# Patient Record
Sex: FEMALE | Race: BLACK OR AFRICAN AMERICAN | NOT HISPANIC OR LATINO | Employment: UNEMPLOYED | RURAL
[De-identification: names, ages, dates, MRNs, and addresses within clinical notes are randomized per-mention and may not be internally consistent; named-entity substitution may affect disease eponyms.]

---

## 2020-04-06 ENCOUNTER — HISTORICAL (OUTPATIENT)
Dept: ADMINISTRATIVE | Facility: HOSPITAL | Age: 45
End: 2020-04-06

## 2020-07-24 ENCOUNTER — HISTORICAL (OUTPATIENT)
Dept: ADMINISTRATIVE | Facility: HOSPITAL | Age: 45
End: 2020-07-24

## 2020-07-24 LAB
ALBUMIN SERPL BCP-MCNC: 4.5 G/DL (ref 3.5–5)
ALBUMIN/GLOB SERPL: 1 {RATIO}
ALP SERPL-CCNC: 88 U/L (ref 37–98)
ALT SERPL W P-5'-P-CCNC: 19 U/L (ref 13–56)
AMYLASE SERPL-CCNC: 134 U/L (ref 25–115)
ANION GAP SERPL CALCULATED.3IONS-SCNC: 16 MMOL/L
APTT PPP: 30.3 SECONDS (ref 25.2–37.3)
AST SERPL W P-5'-P-CCNC: 21 U/L (ref 15–37)
BACTERIA #/AREA URNS HPF: ABNORMAL /HPF
BASOPHILS # BLD AUTO: 0.05 X10E3/UL (ref 0–0.2)
BASOPHILS NFR BLD AUTO: 0.3 % (ref 0–1)
BILIRUB SERPL-MCNC: 0.6 MG/DL (ref 0–1.2)
BILIRUB UR QL STRIP: ABNORMAL MG/DL
BUN SERPL-MCNC: 9 MG/DL (ref 7–18)
BUN/CREAT SERPL: 10
CALCIUM SERPL-MCNC: 9.2 MG/DL (ref 8.5–10.1)
CHLORIDE SERPL-SCNC: 100 MMOL/L (ref 98–107)
CK MB SERPL-MCNC: 1.4 NG/ML (ref 1–3.6)
CK SERPL-CCNC: 193 U/L (ref 26–192)
CLARITY UR: ABNORMAL
CLARITY UR: ABNORMAL
CO2 SERPL-SCNC: 20 MMOL/L (ref 21–32)
COLOR UR: ABNORMAL
COLOR UR: ABNORMAL
CREAT SERPL-MCNC: 0.9 MG/DL (ref 0.55–1.02)
EOSINOPHIL # BLD AUTO: 0.02 X10E3/UL (ref 0–0.5)
EOSINOPHIL NFR BLD AUTO: 0.1 % (ref 1–4)
ERYTHROCYTE [DISTWIDTH] IN BLOOD BY AUTOMATED COUNT: 14.5 % (ref 11.5–14.5)
GLOBULIN SER-MCNC: 4.4 G/DL (ref 2–4)
GLUCOSE SERPL-MCNC: 115 MG/DL (ref 74–106)
GLUCOSE UR STRIP-MCNC: NEGATIVE MG/DL
HCT VFR BLD AUTO: 38.4 % (ref 38–47)
HGB BLD-MCNC: 12.5 G/DL (ref 12–16)
HYALINE CASTS #/AREA URNS LPF: ABNORMAL /LPF (ref 0–2)
IMM GRANULOCYTES # BLD AUTO: 0.08 X10E3/UL (ref 0–0.04)
IMM GRANULOCYTES NFR BLD: 0.5 % (ref 0–0.4)
INR BLD: 1.18 (ref 0–3.3)
KETONES UR STRIP-SCNC: 40 MG/DL
LEUKOCYTE ESTERASE UR QL STRIP: NEGATIVE LEU/UL
LIPASE SERPL-CCNC: 106 U/L (ref 73–393)
LYMPHOCYTES # BLD AUTO: 1.76 X10E3/UL (ref 1–4.8)
LYMPHOCYTES NFR BLD AUTO: 11.5 % (ref 27–41)
MAGNESIUM SERPL-MCNC: 1.8 MG/DL (ref 1.7–2.3)
MCH RBC QN AUTO: 26.5 PG (ref 27–31)
MCHC RBC AUTO-ENTMCNC: 32.6 G/DL (ref 32–36)
MCV RBC AUTO: 81.4 FL (ref 80–96)
MONOCYTES # BLD AUTO: 0.77 X10E3/UL (ref 0–0.8)
MONOCYTES NFR BLD AUTO: 5 % (ref 2–6)
MPC BLD CALC-MCNC: 9.9 FL (ref 9.4–12.4)
MUCOUS THREADS #/AREA URNS HPF: ABNORMAL /HPF
MYOGLOBIN SERPL-MCNC: 48 NG/ML (ref 13–71)
NEUTROPHILS # BLD AUTO: 12.6 X10E3/UL (ref 1.8–7.7)
NEUTROPHILS NFR BLD AUTO: 82.6 % (ref 53–65)
NITRITE UR QL STRIP: NEGATIVE
NRBC # BLD AUTO: 0 X10E3/UL (ref 0–0)
NRBC, AUTO (.00): 0 /100 (ref 0–0)
PH UR STRIP: 6 [PH] (ref 5–8)
PLATELET # BLD AUTO: 366 X10E3/UL (ref 150–400)
POTASSIUM SERPL-SCNC: 3.3 MMOL/L (ref 3.5–5.1)
PROT SERPL-MCNC: 8.9 G/DL (ref 6.4–8.2)
PROT UR QL STRIP: >=300 MG/DL
PROTHROMBIN TIME: 14.4 SECONDS (ref 11.7–14.7)
RBC # BLD AUTO: 4.72 X10E6/UL (ref 4.2–5.4)
RBC # UR STRIP: ABNORMAL ERY/UL
RBC #/AREA URNS HPF: ABNORMAL /HPF (ref 0–3)
SODIUM SERPL-SCNC: 133 MMOL/L (ref 136–145)
SP GR UR STRIP: >=1.03 (ref 1–1.03)
SQUAMOUS #/AREA URNS LPF: ABNORMAL /LPF
TROPONIN I SERPL-MCNC: 0.02 NG/ML (ref 0–0.06)
UROBILINOGEN UR STRIP-ACNC: 0.2 EU/DL
WBC # BLD AUTO: 15.28 X10E3/UL (ref 4.5–11)
WBC #/AREA URNS HPF: ABNORMAL /HPF (ref 0–5)

## 2021-04-07 ENCOUNTER — HOSPITAL ENCOUNTER (EMERGENCY)
Facility: HOSPITAL | Age: 46
Discharge: HOME | End: 2021-04-07

## 2021-04-07 VITALS
BODY MASS INDEX: 26.58 KG/M2 | TEMPERATURE: 98 F | HEIGHT: 63 IN | SYSTOLIC BLOOD PRESSURE: 158 MMHG | HEART RATE: 85 BPM | RESPIRATION RATE: 20 BRPM | WEIGHT: 150 LBS | DIASTOLIC BLOOD PRESSURE: 90 MMHG | OXYGEN SATURATION: 100 %

## 2021-04-07 DIAGNOSIS — T78.40XA ALLERGIC REACTION, INITIAL ENCOUNTER: Primary | ICD-10-CM

## 2021-04-07 LAB
ALBUMIN SERPL BCP-MCNC: 4 G/DL (ref 3.5–5)
ALBUMIN/GLOB SERPL: 1 {RATIO}
ALP SERPL-CCNC: 79 U/L (ref 37–98)
ALT SERPL W P-5'-P-CCNC: 24 U/L (ref 13–56)
ANION GAP SERPL CALCULATED.3IONS-SCNC: 15 MMOL/L
AST SERPL W P-5'-P-CCNC: 21 U/L (ref 15–37)
BASOPHILS # BLD AUTO: 0.06 X10E3/UL (ref 0–0.2)
BASOPHILS NFR BLD AUTO: 0.6 % (ref 0–1)
BILIRUB SERPL-MCNC: 0.3 MG/DL (ref 0–1.2)
BUN SERPL-MCNC: 8 MG/DL (ref 7–18)
BUN/CREAT SERPL: 10.4
CALCIUM SERPL-MCNC: 8.6 MG/DL (ref 8.5–10.1)
CHLORIDE SERPL-SCNC: 103 MMOL/L (ref 98–107)
CO2 SERPL-SCNC: 25 MMOL/L (ref 21–32)
CREAT SERPL-MCNC: 0.77 MG/DL (ref 0.55–1.02)
EOSINOPHIL # BLD AUTO: 0.14 X10E3/UL (ref 0–0.5)
EOSINOPHIL NFR BLD AUTO: 1.5 % (ref 1–4)
ERYTHROCYTE [DISTWIDTH] IN BLOOD BY AUTOMATED COUNT: 15.8 % (ref 11.5–14.5)
GLOBULIN SER-MCNC: 4 G/DL (ref 2–4)
GLUCOSE SERPL-MCNC: 86 MG/DL (ref 74–106)
HCT VFR BLD AUTO: 33.8 % (ref 38–47)
HGB BLD-MCNC: 10.8 G/DL (ref 12–16)
IMM GRANULOCYTES # BLD AUTO: 0.02 X10E3/UL (ref 0–0.04)
IMM GRANULOCYTES NFR BLD: 0.2 % (ref 0–0.4)
LYMPHOCYTES # BLD AUTO: 2.85 X10E3/UL (ref 1–4.8)
LYMPHOCYTES NFR BLD AUTO: 30.1 % (ref 27–41)
MCH RBC QN AUTO: 25.8 PG (ref 27–31)
MCHC RBC AUTO-ENTMCNC: 32 G/DL (ref 32–36)
MCV RBC AUTO: 80.7 FL (ref 80–96)
MONOCYTES # BLD AUTO: 0.61 X10E3/UL (ref 0–0.8)
MONOCYTES NFR BLD AUTO: 6.4 % (ref 2–6)
MPC BLD CALC-MCNC: 10.5 FL (ref 9.4–12.4)
NEUTROPHILS # BLD AUTO: 5.79 X10E3/UL (ref 1.8–7.7)
NEUTROPHILS NFR BLD AUTO: 61.2 % (ref 53–65)
NRBC # BLD AUTO: 0 X10E3/UL (ref 0–0)
NRBC, AUTO (.00): 0 /100 (ref 0–0)
PLATELET # BLD AUTO: 344 X10E3/UL (ref 150–400)
POTASSIUM SERPL-SCNC: 3.2 MMOL/L (ref 3.5–5.1)
PROT SERPL-MCNC: 8 G/DL (ref 6.4–8.2)
RBC # BLD AUTO: 4.19 X10E6/UL (ref 4.2–5.4)
SODIUM SERPL-SCNC: 140 MMOL/L (ref 136–145)
WBC # BLD AUTO: 9.47 X10E3/UL (ref 4.5–11)

## 2021-04-07 PROCEDURE — 96374 THER/PROPH/DIAG INJ IV PUSH: CPT

## 2021-04-07 PROCEDURE — 85025 COMPLETE CBC W/AUTO DIFF WBC: CPT

## 2021-04-07 PROCEDURE — 80053 COMPREHEN METABOLIC PANEL: CPT

## 2021-04-07 PROCEDURE — 96375 TX/PRO/DX INJ NEW DRUG ADDON: CPT

## 2021-04-07 PROCEDURE — 99283 EMERGENCY DEPT VISIT LOW MDM: CPT | Mod: 25

## 2021-04-07 PROCEDURE — 25000003 PHARM REV CODE 250: Performed by: NURSE PRACTITIONER

## 2021-04-07 PROCEDURE — 63600175 PHARM REV CODE 636 W HCPCS: Performed by: NURSE PRACTITIONER

## 2021-04-07 PROCEDURE — 99283 PR EMERGENCY DEPT VISIT,LEVEL III: ICD-10-PCS | Mod: ,,, | Performed by: NURSE PRACTITIONER

## 2021-04-07 PROCEDURE — 96361 HYDRATE IV INFUSION ADD-ON: CPT

## 2021-04-07 PROCEDURE — 36415 COLL VENOUS BLD VENIPUNCTURE: CPT

## 2021-04-07 PROCEDURE — 99283 EMERGENCY DEPT VISIT LOW MDM: CPT | Mod: ,,, | Performed by: NURSE PRACTITIONER

## 2021-04-07 RX ORDER — HYDROXYZINE HYDROCHLORIDE 25 MG/1
25 TABLET, FILM COATED ORAL EVERY 8 HOURS
Qty: 12 TABLET | Refills: 0 | Status: ON HOLD | OUTPATIENT
Start: 2021-04-07 | End: 2022-12-05 | Stop reason: HOSPADM

## 2021-04-07 RX ORDER — METHYLPREDNISOLONE 4 MG/1
TABLET ORAL
Qty: 1 PACKAGE | Refills: 0 | Status: SHIPPED | OUTPATIENT
Start: 2021-04-07 | End: 2021-04-28

## 2021-04-07 RX ORDER — DIPHENHYDRAMINE HYDROCHLORIDE 50 MG/ML
25 INJECTION INTRAMUSCULAR; INTRAVENOUS
Status: COMPLETED | OUTPATIENT
Start: 2021-04-07 | End: 2021-04-07

## 2021-04-07 RX ORDER — METHYLPREDNISOLONE SOD SUCC 125 MG
125 VIAL (EA) INJECTION
Status: COMPLETED | OUTPATIENT
Start: 2021-04-07 | End: 2021-04-07

## 2021-04-07 RX ORDER — FAMOTIDINE 20 MG/1
20 TABLET, FILM COATED ORAL 2 TIMES DAILY
Qty: 14 TABLET | Refills: 0 | Status: ON HOLD | OUTPATIENT
Start: 2021-04-07 | End: 2022-12-05 | Stop reason: HOSPADM

## 2021-04-07 RX ORDER — ERGOCALCIFEROL 1.25 MG/1
50000 CAPSULE ORAL
COMMUNITY
End: 2024-03-04

## 2021-04-07 RX ORDER — IBUPROFEN 100 MG/5ML
1000 SUSPENSION, ORAL (FINAL DOSE FORM) ORAL DAILY
COMMUNITY
End: 2024-03-04

## 2021-04-07 RX ORDER — FAMOTIDINE 10 MG/ML
20 INJECTION INTRAVENOUS
Status: COMPLETED | OUTPATIENT
Start: 2021-04-07 | End: 2021-04-07

## 2021-04-07 RX ORDER — LISINOPRIL AND HYDROCHLOROTHIAZIDE 20; 25 MG/1; MG/1
1 TABLET ORAL DAILY
COMMUNITY
End: 2021-04-07

## 2021-04-07 RX ADMIN — SODIUM CHLORIDE 1000 ML: 9 INJECTION, SOLUTION INTRAVENOUS at 04:04

## 2021-04-07 RX ADMIN — DIPHENHYDRAMINE HYDROCHLORIDE 25 MG: 50 INJECTION INTRAMUSCULAR; INTRAVENOUS at 04:04

## 2021-04-07 RX ADMIN — FAMOTIDINE 20 MG: 10 INJECTION, SOLUTION INTRAVENOUS at 04:04

## 2021-04-07 RX ADMIN — METHYLPREDNISOLONE SODIUM SUCCINATE 125 MG: 125 INJECTION, POWDER, FOR SOLUTION INTRAMUSCULAR; INTRAVENOUS at 04:04

## 2022-12-03 ENCOUNTER — HOSPITAL ENCOUNTER (OUTPATIENT)
Facility: HOSPITAL | Age: 47
Discharge: HOME | End: 2022-12-05
Attending: HOSPITALIST | Admitting: HOSPITALIST

## 2022-12-03 ENCOUNTER — HOSPITAL ENCOUNTER (OUTPATIENT)
Facility: HOSPITAL | Age: 47
Discharge: HOME | End: 2022-12-03
Attending: FAMILY MEDICINE | Admitting: HOSPITALIST

## 2022-12-03 VITALS
DIASTOLIC BLOOD PRESSURE: 87 MMHG | BODY MASS INDEX: 31.54 KG/M2 | SYSTOLIC BLOOD PRESSURE: 125 MMHG | WEIGHT: 178 LBS | HEART RATE: 110 BPM | TEMPERATURE: 99 F | HEIGHT: 63 IN | OXYGEN SATURATION: 100 % | RESPIRATION RATE: 18 BRPM

## 2022-12-03 DIAGNOSIS — E86.0 DEHYDRATION WITH HYPONATREMIA: ICD-10-CM

## 2022-12-03 DIAGNOSIS — R11.2 NAUSEA AND VOMITING, UNSPECIFIED VOMITING TYPE: Primary | ICD-10-CM

## 2022-12-03 DIAGNOSIS — E87.1 DEHYDRATION WITH HYPONATREMIA: ICD-10-CM

## 2022-12-03 DIAGNOSIS — E86.0 DEHYDRATION: ICD-10-CM

## 2022-12-03 DIAGNOSIS — I50.9 CHF (CONGESTIVE HEART FAILURE): ICD-10-CM

## 2022-12-03 DIAGNOSIS — I10 PRIMARY HYPERTENSION: Primary | ICD-10-CM

## 2022-12-03 DIAGNOSIS — E66.9 OBESITY (BMI 30-39.9): ICD-10-CM

## 2022-12-03 DIAGNOSIS — Z72.0 TOBACCO USE: ICD-10-CM

## 2022-12-03 DIAGNOSIS — I10 HYPERTENSION, UNSPECIFIED TYPE: ICD-10-CM

## 2022-12-03 DIAGNOSIS — R06.02 SHORTNESS OF BREATH: ICD-10-CM

## 2022-12-03 DIAGNOSIS — R11.2 INTRACTABLE NAUSEA AND VOMITING: ICD-10-CM

## 2022-12-03 DIAGNOSIS — R06.02 SOB (SHORTNESS OF BREATH): ICD-10-CM

## 2022-12-03 PROBLEM — I47.20 VENTRICULAR TACHYCARDIA: Status: ACTIVE | Noted: 2022-12-03

## 2022-12-03 PROBLEM — D72.829 LEUKOCYTOSIS: Status: ACTIVE | Noted: 2022-12-03

## 2022-12-03 LAB
ALBUMIN SERPL BCP-MCNC: 4.2 G/DL (ref 3.5–5)
ALBUMIN/GLOB SERPL: 0.9 {RATIO}
ALP SERPL-CCNC: 104 U/L (ref 39–100)
ALT SERPL W P-5'-P-CCNC: 20 U/L (ref 13–56)
ANION GAP SERPL CALCULATED.3IONS-SCNC: 16 MMOL/L (ref 7–16)
AST SERPL W P-5'-P-CCNC: 26 U/L (ref 15–37)
B-HCG UR QL: NEGATIVE
BACTERIA #/AREA URNS HPF: ABNORMAL /HPF
BACTERIA #/AREA URNS HPF: ABNORMAL /HPF
BASOPHILS # BLD AUTO: 0.06 K/UL (ref 0–0.2)
BASOPHILS NFR BLD AUTO: 0.4 % (ref 0–1)
BILIRUB SERPL-MCNC: 0.5 MG/DL (ref ?–1.2)
BILIRUB UR QL STRIP: NEGATIVE
BILIRUB UR QL STRIP: NEGATIVE
BUN SERPL-MCNC: 10 MG/DL (ref 7–18)
BUN/CREAT SERPL: 14 (ref 6–20)
CALCIUM SERPL-MCNC: 9.4 MG/DL (ref 8.5–10.1)
CHLORIDE SERPL-SCNC: 98 MMOL/L (ref 98–107)
CLARITY UR: CLEAR
CLARITY UR: CLEAR
CO2 SERPL-SCNC: 25 MMOL/L (ref 21–32)
COLOR UR: ABNORMAL
COLOR UR: YELLOW
CREAT SERPL-MCNC: 0.74 MG/DL (ref 0.55–1.02)
CTP QC/QA: YES
DIFFERENTIAL METHOD BLD: ABNORMAL
EGFR (NO RACE VARIABLE) (RUSH/TITUS): 101 ML/MIN/1.73M²
EOSINOPHIL # BLD AUTO: 0.01 K/UL (ref 0–0.5)
EOSINOPHIL NFR BLD AUTO: 0.1 % (ref 1–4)
ERYTHROCYTE [DISTWIDTH] IN BLOOD BY AUTOMATED COUNT: 16.5 % (ref 11.5–14.5)
GLOBULIN SER-MCNC: 4.7 G/DL (ref 2–4)
GLUCOSE SERPL-MCNC: 102 MG/DL (ref 74–106)
GLUCOSE UR STRIP-MCNC: 30 MG/DL
GLUCOSE UR STRIP-MCNC: NORMAL MG/DL
HCT VFR BLD AUTO: 38.8 % (ref 38–47)
HGB BLD-MCNC: 12 G/DL (ref 12–16)
IMM GRANULOCYTES # BLD AUTO: 0.05 K/UL (ref 0–0.04)
IMM GRANULOCYTES NFR BLD: 0.3 % (ref 0–0.4)
KETONES UR STRIP-SCNC: 100 MG/DL
KETONES UR STRIP-SCNC: 80 MG/DL
LEUKOCYTE ESTERASE UR QL STRIP: NEGATIVE
LEUKOCYTE ESTERASE UR QL STRIP: NEGATIVE
LIPASE SERPL-CCNC: 102 U/L (ref 73–393)
LYMPHOCYTES # BLD AUTO: 1.78 K/UL (ref 1–4.8)
LYMPHOCYTES NFR BLD AUTO: 10.7 % (ref 27–41)
MCH RBC QN AUTO: 23.4 PG (ref 27–31)
MCHC RBC AUTO-ENTMCNC: 30.9 G/DL (ref 32–36)
MCV RBC AUTO: 75.6 FL (ref 80–96)
MONOCYTES # BLD AUTO: 0.76 K/UL (ref 0–0.8)
MONOCYTES NFR BLD AUTO: 4.6 % (ref 2–6)
MPC BLD CALC-MCNC: 9.9 FL (ref 9.4–12.4)
MUCOUS THREADS #/AREA URNS HPF: ABNORMAL /HPF
MUCOUS THREADS #/AREA URNS HPF: ABNORMAL /HPF
NEUTROPHILS # BLD AUTO: 13.9 K/UL (ref 1.8–7.7)
NEUTROPHILS NFR BLD AUTO: 83.9 % (ref 53–65)
NITRITE UR QL STRIP: NEGATIVE
NITRITE UR QL STRIP: NEGATIVE
NRBC # BLD AUTO: 0 X10E3/UL
NRBC, AUTO (.00): 0 %
NT-PROBNP SERPL-MCNC: 400 PG/ML (ref 1–125)
PH UR STRIP: 6 PH UNITS
PH UR STRIP: 6.5 PH UNITS
PLATELET # BLD AUTO: 468 K/UL (ref 150–400)
POTASSIUM SERPL-SCNC: 3.8 MMOL/L (ref 3.5–5.1)
PROT SERPL-MCNC: 8.9 G/DL (ref 6.4–8.2)
PROT UR QL STRIP: 30
PROT UR QL STRIP: 600
RBC # BLD AUTO: 5.13 M/UL (ref 4.2–5.4)
RBC # UR STRIP: ABNORMAL /UL
RBC # UR STRIP: ABNORMAL /UL
RBC #/AREA URNS HPF: ABNORMAL /HPF
RBC #/AREA URNS HPF: ABNORMAL /HPF
SODIUM SERPL-SCNC: 135 MMOL/L (ref 136–145)
SP GR UR STRIP: 1.03
SP GR UR STRIP: 1.04
SQUAMOUS #/AREA URNS LPF: ABNORMAL /LPF
SQUAMOUS #/AREA URNS LPF: ABNORMAL /LPF
TRICHOMONAS #/AREA URNS HPF: ABNORMAL /HPF
TRICHOMONAS #/AREA URNS HPF: ABNORMAL /HPF
UROBILINOGEN UR STRIP-ACNC: NORMAL MG/DL
UROBILINOGEN UR STRIP-ACNC: NORMAL MG/DL
WBC # BLD AUTO: 16.56 K/UL (ref 4.5–11)
WBC #/AREA URNS HPF: ABNORMAL /HPF
WBC #/AREA URNS HPF: ABNORMAL /HPF
YEAST #/AREA URNS HPF: ABNORMAL /HPF
YEAST #/AREA URNS HPF: ABNORMAL /HPF

## 2022-12-03 PROCEDURE — 63600175 PHARM REV CODE 636 W HCPCS: Performed by: NURSE PRACTITIONER

## 2022-12-03 PROCEDURE — 96361 HYDRATE IV INFUSION ADD-ON: CPT

## 2022-12-03 PROCEDURE — 99220 PR INITIAL OBSERVATION CARE,LEVL III: CPT | Mod: ,,, | Performed by: HOSPITALIST

## 2022-12-03 PROCEDURE — 99283 EMERGENCY DEPT VISIT LOW MDM: CPT | Mod: ,,, | Performed by: NURSE PRACTITIONER

## 2022-12-03 PROCEDURE — 96374 THER/PROPH/DIAG INJ IV PUSH: CPT

## 2022-12-03 PROCEDURE — 83880 ASSAY OF NATRIURETIC PEPTIDE: CPT | Performed by: HOSPITALIST

## 2022-12-03 PROCEDURE — 81001 URINALYSIS AUTO W/SCOPE: CPT | Performed by: FAMILY MEDICINE

## 2022-12-03 PROCEDURE — 99220 PR INITIAL OBSERVATION CARE,LEVL III: ICD-10-PCS | Mod: ,,, | Performed by: HOSPITALIST

## 2022-12-03 PROCEDURE — 99285 EMERGENCY DEPT VISIT HI MDM: CPT | Mod: 25

## 2022-12-03 PROCEDURE — 25000003 PHARM REV CODE 250: Performed by: NURSE PRACTITIONER

## 2022-12-03 PROCEDURE — 81001 URINALYSIS AUTO W/SCOPE: CPT | Performed by: NURSE PRACTITIONER

## 2022-12-03 PROCEDURE — 99284 EMERGENCY DEPT VISIT MOD MDM: CPT | Mod: ,,, | Performed by: FAMILY MEDICINE

## 2022-12-03 PROCEDURE — 80053 COMPREHEN METABOLIC PANEL: CPT | Performed by: FAMILY MEDICINE

## 2022-12-03 PROCEDURE — 83690 ASSAY OF LIPASE: CPT | Performed by: FAMILY MEDICINE

## 2022-12-03 PROCEDURE — 99283 PR EMERGENCY DEPT VISIT,LEVEL III: ICD-10-PCS | Mod: ,,, | Performed by: NURSE PRACTITIONER

## 2022-12-03 PROCEDURE — 25500020 PHARM REV CODE 255: Performed by: FAMILY MEDICINE

## 2022-12-03 PROCEDURE — 25000003 PHARM REV CODE 250: Performed by: FAMILY MEDICINE

## 2022-12-03 PROCEDURE — 85025 COMPLETE CBC W/AUTO DIFF WBC: CPT | Performed by: FAMILY MEDICINE

## 2022-12-03 PROCEDURE — 36415 COLL VENOUS BLD VENIPUNCTURE: CPT | Performed by: FAMILY MEDICINE

## 2022-12-03 PROCEDURE — 63600175 PHARM REV CODE 636 W HCPCS: Performed by: FAMILY MEDICINE

## 2022-12-03 PROCEDURE — 81025 URINE PREGNANCY TEST: CPT | Performed by: FAMILY MEDICINE

## 2022-12-03 PROCEDURE — 96365 THER/PROPH/DIAG IV INF INIT: CPT

## 2022-12-03 PROCEDURE — G0378 HOSPITAL OBSERVATION PER HR: HCPCS

## 2022-12-03 PROCEDURE — 96375 TX/PRO/DX INJ NEW DRUG ADDON: CPT

## 2022-12-03 PROCEDURE — 99284 PR EMERGENCY DEPT VISIT,LEVEL IV: ICD-10-PCS | Mod: ,,, | Performed by: FAMILY MEDICINE

## 2022-12-03 PROCEDURE — 81003 URINALYSIS AUTO W/O SCOPE: CPT | Performed by: FAMILY MEDICINE

## 2022-12-03 RX ORDER — PROMETHAZINE HYDROCHLORIDE 25 MG/1
25 SUPPOSITORY RECTAL
Status: COMPLETED | OUTPATIENT
Start: 2022-12-03 | End: 2022-12-03

## 2022-12-03 RX ORDER — ONDANSETRON 4 MG/1
4 TABLET, ORALLY DISINTEGRATING ORAL 2 TIMES DAILY
Qty: 20 TABLET | Refills: 0 | Status: ON HOLD | OUTPATIENT
Start: 2022-12-03 | End: 2023-06-28

## 2022-12-03 RX ORDER — METOCLOPRAMIDE HYDROCHLORIDE 5 MG/ML
10 INJECTION INTRAMUSCULAR; INTRAVENOUS
Status: COMPLETED | OUTPATIENT
Start: 2022-12-03 | End: 2022-12-03

## 2022-12-03 RX ORDER — FAMOTIDINE 10 MG/ML
20 INJECTION INTRAVENOUS
Status: COMPLETED | OUTPATIENT
Start: 2022-12-03 | End: 2022-12-03

## 2022-12-03 RX ORDER — ONDANSETRON 2 MG/ML
4 INJECTION INTRAMUSCULAR; INTRAVENOUS
Status: COMPLETED | OUTPATIENT
Start: 2022-12-03 | End: 2022-12-03

## 2022-12-03 RX ADMIN — PROMETHAZINE HYDROCHLORIDE 25 MG: 25 INJECTION INTRAMUSCULAR; INTRAVENOUS at 09:12

## 2022-12-03 RX ADMIN — PROMETHAZINE HYDROCHLORIDE 25 MG: 25 SUPPOSITORY RECTAL at 08:12

## 2022-12-03 RX ADMIN — FAMOTIDINE 20 MG: 10 INJECTION, SOLUTION INTRAVENOUS at 08:12

## 2022-12-03 RX ADMIN — SODIUM CHLORIDE 1000 ML: 9 INJECTION, SOLUTION INTRAVENOUS at 03:12

## 2022-12-03 RX ADMIN — ONDANSETRON 4 MG: 2 INJECTION INTRAMUSCULAR; INTRAVENOUS at 01:12

## 2022-12-03 RX ADMIN — IOPAMIDOL 100 ML: 755 INJECTION, SOLUTION INTRAVENOUS at 03:12

## 2022-12-03 RX ADMIN — SODIUM CHLORIDE 1000 ML: 9 INJECTION, SOLUTION INTRAVENOUS at 08:12

## 2022-12-03 RX ADMIN — SODIUM CHLORIDE, POTASSIUM CHLORIDE, SODIUM LACTATE AND CALCIUM CHLORIDE 1000 ML: 600; 310; 30; 20 INJECTION, SOLUTION INTRAVENOUS at 10:12

## 2022-12-03 RX ADMIN — SODIUM CHLORIDE 1000 ML: 9 INJECTION, SOLUTION INTRAVENOUS at 09:12

## 2022-12-03 RX ADMIN — SODIUM CHLORIDE 1000 ML: 9 INJECTION, SOLUTION INTRAVENOUS at 01:12

## 2022-12-03 RX ADMIN — METOCLOPRAMIDE 10 MG: 5 INJECTION, SOLUTION INTRAMUSCULAR; INTRAVENOUS at 08:12

## 2022-12-03 NOTE — ASSESSMENT & PLAN NOTE
Likely related to dehydration.   There is history of panic attacks.   Continue to follow.    Unable to complete additional studies as patient is leaving the hospital.

## 2022-12-03 NOTE — HPI
46yo woman history of HTN who presents with intractable n/v for the last four days.  She states that she hasn't been able to eat or drink much over the last four days.  She also reported a history of heart failure that was diagnosed several years ago in an emergency department.  She reports swelling in lower extremities and upper extremities although she is not swollen now.         She has ketones in her urine.  Admission was recommended by Dr. Spain for dehydration.  Patient received 2 liters of IV fluids.  Electrolytes are normal.          Patient informed Dr. Spain that she would like to go home.  Unclear if patient will be leaving AMA or not.

## 2022-12-03 NOTE — ASSESSMENT & PLAN NOTE
Body mass index is 31.53 kg/m². Morbid obesity complicates all aspects of disease management from diagnostic modalities to treatment. Weight loss encouraged and health benefits explained to patient.

## 2022-12-03 NOTE — CONSULTS
[facility] - Emergency Department  Hospital Medicine  Consult Note    Patient Name: Martita Hilliard  MRN: [id number]  Admission Date: 12/3/2022  Hospital Length of Stay: 0 days  Attending Physician: No att. providers found   Primary Care Provider: Primary Doctor No           Patient information was obtained from patient, past medical records and ER records.     Consults  Subjective:     Principal Problem: Nausea and vomiting    Chief Complaint:   Chief Complaint   Patient presents with    Dehydration    Vomiting        HPI: 48yo woman history of HTN who presents with intractable n/v for the last four days.  She states that she hasn't been able to eat or drink much over the last four days.  She also reported a history of heart failure that was diagnosed several years ago in an emergency department.  She reports swelling in lower extremities and upper extremities although she is not swollen now.         She has ketones in her urine.  Admission was recommended by Dr. Spain for dehydration.  Patient received 2 liters of IV fluids.  Electrolytes are normal.          Patient informed Dr. Spain that she would like to go home.  Unclear if patient will be leaving AMA or not.          No past medical history on file.    No past surgical history on file.    Review of patient's allergies indicates:   Allergen Reactions    Penicillins     Rocephin [ceftriaxone]        No current facility-administered medications on file prior to encounter.     Current Outpatient Medications on File Prior to Encounter   Medication Sig    [DISCONTINUED] lisinopriL-hydrochlorothiazide (PRINZIDE,ZESTORETIC) 20-25 mg Tab Take 1 tablet by mouth once daily.    ascorbic acid, vitamin C, (VITAMIN C) 1000 MG tablet Take 1,000 mg by mouth once daily.    ergocalciferol (VITAMIN D2) 50,000 unit Cap Take 50,000 Units by mouth every 7 days.    famotidine (PEPCID) 20 MG tablet Take 1 tablet (20 mg total) by mouth 2 (two) times daily. for 7 days     hydrOXYzine HCL (ATARAX) 25 MG tablet Take 1 tablet (25 mg total) by mouth every 8 (eight) hours. Take one tab PO every 8 hours x2 days.  Then take 1 tab PO every 8 hours as needed for swelling or other signs of allergic reaction.     Family History    None       Tobacco Use    Smoking status: Unknown    Smokeless tobacco: Not on file   Substance and Sexual Activity    Alcohol use: Not on file    Drug use: Not on file    Sexual activity: Not on file     Review of Systems   Constitutional:  Negative for activity change, chills, fatigue and fever.   HENT:  Negative for congestion and sore throat.    Respiratory:  Negative for chest tightness.    Gastrointestinal:  Positive for diarrhea, nausea and vomiting. Negative for abdominal distention and abdominal pain.   Endocrine: Negative for cold intolerance and heat intolerance.   Genitourinary:  Negative for dysuria.   Musculoskeletal:  Negative for arthralgias and back pain.   Skin:  Negative for color change and rash.   Neurological:  Negative for dizziness, tremors and headaches.   Psychiatric/Behavioral:  Negative for agitation. The patient is not nervous/anxious.    Objective:     Vital Signs (Most Recent):  Temp: 98.5 °F (36.9 °C) (12/03/22 1235)  Pulse: 110 (12/03/22 1232)  Resp: 18 (12/03/22 1232)  BP: 125/87 (12/03/22 1232)  SpO2: 100 % (12/03/22 1232) Vital Signs (24h Range):  Temp:  [98.5 °F (36.9 °C)] 98.5 °F (36.9 °C)  Pulse:  [110] 110  Resp:  [18] 18  SpO2:  [100 %] 100 %  BP: (125)/(87) 125/87     Weight: 80.7 kg (178 lb)  Body mass index is 31.53 kg/m².    Physical Exam  Constitutional:       Appearance: Normal appearance.   HENT:      Head: Normocephalic and atraumatic.      Nose: Nose normal.      Mouth/Throat:      Mouth: Mucous membranes are moist.      Pharynx: Oropharynx is clear.   Eyes:      Extraocular Movements: Extraocular movements intact.      Conjunctiva/sclera: Conjunctivae normal.      Pupils: Pupils are equal, round, and reactive to  light.   Cardiovascular:      Rate and Rhythm: Normal rate and regular rhythm.      Pulses: Normal pulses.      Heart sounds: Normal heart sounds.   Pulmonary:      Effort: Pulmonary effort is normal.      Breath sounds: Normal breath sounds.   Abdominal:      General: Abdomen is flat. Bowel sounds are normal.      Palpations: Abdomen is soft.   Musculoskeletal:         General: Normal range of motion.      Cervical back: Normal range of motion and neck supple.   Skin:     General: Skin is warm and dry.   Neurological:      General: No focal deficit present.      Mental Status: She is alert and oriented to person, place, and time.   Psychiatric:         Mood and Affect: Mood normal.         Behavior: Behavior normal.       Significant Labs: All pertinent labs within the past 24 hours have been reviewed.    Significant Imaging: I have reviewed all pertinent imaging results/findings within the past 24 hours.    Assessment/Plan:     * Nausea and vomiting  Intractable nausea and vomiting for four days  This improved with zofran.   Patient no longer with nausea and would like to be discharged.       Leukocytosis  WBC 16.  Maybe related to dehydration.   Infection source unclear.  Work-up not completed prior to discharge.        Obesity (BMI 30-39.9)  Body mass index is 31.53 kg/m². Morbid obesity complicates all aspects of disease management from diagnostic modalities to treatment. Weight loss encouraged and health benefits explained to patient.         Shortness of breath  Etiology is unclear and is improving.    No work-up in ED prior to patient leaving.    BNP and D dimer ordered but not completed.    CXR ordered.    Studies ordered by me not completed prior to patient leaving.        Ventricular tachycardia  Likely related to dehydration.   There is history of panic attacks.   Continue to follow.    Unable to complete additional studies as patient is leaving the hospital.        VTE Risk Mitigation (From admission,  onward)    None              Thank you for your consult. I will follow-up with patient. Please contact us if you have any additional questions.    Michelle Price MD  Department of Hospital Medicine   [facility] - Emergency Department

## 2022-12-03 NOTE — ASSESSMENT & PLAN NOTE
Etiology is unclear and is improving.    No work-up in ED prior to patient leaving.    BNP and D dimer ordered but not completed.    CXR ordered.    Studies ordered by me not completed prior to patient leaving.

## 2022-12-03 NOTE — ASSESSMENT & PLAN NOTE
WBC 16.  Maybe related to dehydration.   Infection source unclear.  Work-up not completed prior to discharge.

## 2022-12-03 NOTE — DISCHARGE INSTRUCTIONS
Stop smoking marijuana. Follow a liquid diet for the next few days, until your stomach feels better. You can then add some bland foods, like toast, applesauce, chicken noodle soup, crackers, etc. When you are able to eat bland foods without difficulty, you may advance back to your regular diet.  Follow up with the [facility].

## 2022-12-03 NOTE — SUBJECTIVE & OBJECTIVE
No past medical history on file.    No past surgical history on file.    Review of patient's allergies indicates:   Allergen Reactions    Penicillins     Rocephin [ceftriaxone]        No current facility-administered medications on file prior to encounter.     Current Outpatient Medications on File Prior to Encounter   Medication Sig    [DISCONTINUED] lisinopriL-hydrochlorothiazide (PRINZIDE,ZESTORETIC) 20-25 mg Tab Take 1 tablet by mouth once daily.    ascorbic acid, vitamin C, (VITAMIN C) 1000 MG tablet Take 1,000 mg by mouth once daily.    ergocalciferol (VITAMIN D2) 50,000 unit Cap Take 50,000 Units by mouth every 7 days.    famotidine (PEPCID) 20 MG tablet Take 1 tablet (20 mg total) by mouth 2 (two) times daily. for 7 days    hydrOXYzine HCL (ATARAX) 25 MG tablet Take 1 tablet (25 mg total) by mouth every 8 (eight) hours. Take one tab PO every 8 hours x2 days.  Then take 1 tab PO every 8 hours as needed for swelling or other signs of allergic reaction.     Family History    None       Tobacco Use    Smoking status: Unknown    Smokeless tobacco: Not on file   Substance and Sexual Activity    Alcohol use: Not on file    Drug use: Not on file    Sexual activity: Not on file     Review of Systems   Constitutional:  Negative for activity change, chills, fatigue and fever.   HENT:  Negative for congestion and sore throat.    Respiratory:  Negative for chest tightness.    Gastrointestinal:  Positive for diarrhea, nausea and vomiting. Negative for abdominal distention and abdominal pain.   Endocrine: Negative for cold intolerance and heat intolerance.   Genitourinary:  Negative for dysuria.   Musculoskeletal:  Negative for arthralgias and back pain.   Skin:  Negative for color change and rash.   Neurological:  Negative for dizziness, tremors and headaches.   Psychiatric/Behavioral:  Negative for agitation. The patient is not nervous/anxious.    Objective:     Vital Signs (Most Recent):  Temp: 98.5 °F (36.9 °C)  (12/03/22 1235)  Pulse: 110 (12/03/22 1232)  Resp: 18 (12/03/22 1232)  BP: 125/87 (12/03/22 1232)  SpO2: 100 % (12/03/22 1232) Vital Signs (24h Range):  Temp:  [98.5 °F (36.9 °C)] 98.5 °F (36.9 °C)  Pulse:  [110] 110  Resp:  [18] 18  SpO2:  [100 %] 100 %  BP: (125)/(87) 125/87     Weight: 80.7 kg (178 lb)  Body mass index is 31.53 kg/m².    Physical Exam  Constitutional:       Appearance: Normal appearance.   HENT:      Head: Normocephalic and atraumatic.      Nose: Nose normal.      Mouth/Throat:      Mouth: Mucous membranes are moist.      Pharynx: Oropharynx is clear.   Eyes:      Extraocular Movements: Extraocular movements intact.      Conjunctiva/sclera: Conjunctivae normal.      Pupils: Pupils are equal, round, and reactive to light.   Cardiovascular:      Rate and Rhythm: Normal rate and regular rhythm.      Pulses: Normal pulses.      Heart sounds: Normal heart sounds.   Pulmonary:      Effort: Pulmonary effort is normal.      Breath sounds: Normal breath sounds.   Abdominal:      General: Abdomen is flat. Bowel sounds are normal.      Palpations: Abdomen is soft.   Musculoskeletal:         General: Normal range of motion.      Cervical back: Normal range of motion and neck supple.   Skin:     General: Skin is warm and dry.   Neurological:      General: No focal deficit present.      Mental Status: She is alert and oriented to person, place, and time.   Psychiatric:         Mood and Affect: Mood normal.         Behavior: Behavior normal.       Significant Labs: All pertinent labs within the past 24 hours have been reviewed.    Significant Imaging: I have reviewed all pertinent imaging results/findings within the past 24 hours.

## 2022-12-03 NOTE — ASSESSMENT & PLAN NOTE
Intractable nausea and vomiting for four days  This improved with zofran.   Patient no longer with nausea and would like to be discharged.

## 2022-12-03 NOTE — ED PROVIDER NOTES
Encounter Date: 12/3/2022    SCRIBE #1 NOTE: I, Paola Wilson, am scribing for, and in the presence of,  Kori Miller MD. I have scribed the entire note.     History     Chief Complaint   Patient presents with    Dehydration    Vomiting     Patient is a 47 y.o. female who presents to the emergency department with complaints of vomiting. Patient explains that 4 days ago she began experiencing nausea and vomiting. Patient also reports diarrhea, shortness of breath, and heart palpitations. Patient further explains that these symptoms occur during every menstrual cycle. Patient notes a medical history of congestive heart failure, hypertension, pancreatitis, and degenerative bone disease. No other symptoms reported.    The history is provided by the patient. No  was used.   Review of patient's allergies indicates:   Allergen Reactions    Ace inhibitors Swelling    Penicillins     Rocephin [ceftriaxone]      Past Medical History:   Diagnosis Date    Hypertension      Past Surgical History:   Procedure Laterality Date    TONSILLECTOMY       No family history on file.  Social History     Tobacco Use    Smoking status: Unknown     Review of Systems   HENT: Negative.     Eyes: Negative.    Respiratory:  Positive for shortness of breath.    Cardiovascular:  Positive for palpitations.   Gastrointestinal:  Positive for diarrhea, nausea and vomiting.   Endocrine: Negative.    Allergic/Immunologic: Negative.    Neurological:  Positive for weakness.   Hematological: Negative.    Psychiatric/Behavioral: Negative.     All other systems reviewed and are negative.    Physical Exam     Initial Vitals   BP Pulse Resp Temp SpO2   12/03/22 1232 12/03/22 1232 12/03/22 1232 12/03/22 1235 12/03/22 1232   125/87 110 18 98.5 °F (36.9 °C) 100 %      MAP       --                Physical Exam    Nursing note and vitals reviewed.  Constitutional: She appears well-developed and well-nourished.   HENT:   Head:  Normocephalic and atraumatic.   Right Ear: External ear normal.   Left Ear: External ear normal.   Nose: Nose normal.   Eyes: EOM are normal.   Neck:   Normal range of motion.  Cardiovascular:  Normal rate.           Pulmonary/Chest: Breath sounds normal.   Abdominal: Bowel sounds are normal.   Musculoskeletal:         General: Normal range of motion.      Cervical back: Normal range of motion.     Neurological: She is alert. She has normal strength.   Skin: Skin is warm and dry.   Psychiatric: She has a normal mood and affect.       ED Course   Procedures  Labs Reviewed   COMPREHENSIVE METABOLIC PANEL - Abnormal; Notable for the following components:       Result Value    Sodium 135 (*)     Total Protein 8.9 (*)     Globulin 4.7 (*)     Alk Phos 104 (*)     All other components within normal limits   CBC WITH DIFFERENTIAL - Abnormal; Notable for the following components:    WBC 16.56 (*)     MCV 75.6 (*)     MCH 23.4 (*)     MCHC 30.9 (*)     RDW 16.5 (*)     Platelet Count 468 (*)     Neutrophils % 83.9 (*)     Lymphocytes % 10.7 (*)     Eosinophils % 0.1 (*)     Neutrophils, Abs 13.90 (*)     Immature Granulocytes, Absolute 0.05 (*)     All other components within normal limits   URINALYSIS, REFLEX TO URINE CULTURE - Abnormal; Notable for the following components:    Glucose, UA 30 (*)     Ketones,  (*)     Blood, UA Large (*)     Specific Gravity, UA 1.031 (*)     All other components within normal limits   URINALYSIS, MICROSCOPIC - Abnormal; Notable for the following components:    Yeast, UA Few (*)     Mucus, UA Moderate (*)     All other components within normal limits   NT-PRO NATRIURETIC PEPTIDE - Abnormal; Notable for the following components:    ProBNP 400 (*)     All other components within normal limits   LIPASE - Normal   CBC W/ AUTO DIFFERENTIAL    Narrative:     The following orders were created for panel order CBC auto differential.  Procedure                               Abnormality          Status                     ---------                               -----------         ------                     CBC with Differential[[id number]]        Abnormal            Final result                 Please view results for these tests on the individual orders.   POCT URINE PREGNANCY          Imaging Results              CT Abdomen Pelvis With Contrast (Final result)  Result time 12/03/22 15:10:32      Final result by Michael Rodriguez DO (12/03/22 15:10:32)                   Impression:      No convincing acute CT findings.  Small hiatal hernia.  Other/detailed findings as above.    The CT exam was performed using one or more of the following dose    reduction techniques- Automated exposure control, adjustment of the mA    and/or kV according to patient size, and/or use of iterative    reconstructed technique.    Point of Service: [facility]      Electronically signed by: Michael Rodriguez  Date:    12/03/2022  Time:    15:10               Narrative:    EXAMINATION:  CT ABDOMEN PELVIS WITH CONTRAST    CLINICAL HISTORY:  Abdominal pain, acute, nonlocalized;    COMPARISON:  CT abdomen pelvis July 24, 2020    TECHNIQUE:  Multiple axial tomographic images of the abdomen and pelvis were obtained after administration of 100 cc Isovue 370 intravenous contrast.    FINDINGS:  Mild dependent changes of the lungs noted.    No worrisome focal hepatic abnormality demonstrated on submitted images.  Visualized gallbladder grossly unremarkable.  Visualized pancreas appears unremarkable.  Spleen grossly unremarkable.    Bilateral adrenal glands grossly unremarkable.  Small renal hypodensities noted which are too small to characterize but may reflect cysts.  No evidence of hydronephrosis.  Uterus and adnexa grossly unremarkable.    Small hiatal hernia.  No convincing evidence of gastrointestinal obstruction or acute appendicitis.  Vasculature grossly unremarkable.  Visualized osseous and surrounding soft tissue structures  demonstrate no acute abnormality.                                       Medications   sodium chloride 0.9% bolus 1,000 mL (0 mLs Intravenous Stopped 12/3/22 1452)   ondansetron injection 4 mg (4 mg Intravenous Given 12/3/22 1353)   sodium chloride 0.9% bolus 1,000 mL (0 mLs Intravenous Stopped 12/3/22 1615)   iopamidoL (ISOVUE-370) injection 100 mL (100 mLs Intravenous Given 12/3/22 1504)     Medical Decision Making:   ED Management:  Patient states she is feeling much better after the Zofran.  She is been able to drink p.o. without any further nausea or vomiting.          Attending Attestation:           Physician Attestation for Scribe:  Physician Attestation Statement for Scribe #1: I, Kori Miller MD, reviewed documentation, as scribed by Paola Girard in my presence, and it is both accurate and complete.                        Clinical Impression:   Final diagnoses:  [E86.0] Dehydration  [R11.2] Nausea and vomiting, unspecified vomiting type (Primary)      ED Disposition Condition    Discharge Stable          ED Prescriptions       Medication Sig Dispense Start Date End Date Auth. Provider    ondansetron (ZOFRAN-ODT) 4 MG TbDL Take 1 tablet (4 mg total) by mouth 2 (two) times daily. 20 tablet 12/3/2022 -- Kori Spain MD          Follow-up Information    None          Kori Spain MD  12/05/22 0083

## 2022-12-03 NOTE — Clinical Note
Diagnosis: Dehydration [276.51.ICD-9-CM]   Future Attending Provider: LU ROWLAND [[id number]]   Admitting Provider:: LU ROWLAND [[id number]]

## 2022-12-04 PROBLEM — Z72.0 TOBACCO USE: Status: ACTIVE | Noted: 2022-12-04

## 2022-12-04 PROBLEM — R11.2 INTRACTABLE NAUSEA AND VOMITING: Status: ACTIVE | Noted: 2022-12-04

## 2022-12-04 PROBLEM — R06.02 SHORTNESS OF BREATH: Status: ACTIVE | Noted: 2022-12-04

## 2022-12-04 LAB
ACETONE SERPL QL SCN: NEGATIVE
ANION GAP SERPL CALCULATED.3IONS-SCNC: 14 MMOL/L (ref 7–16)
BASOPHILS # BLD AUTO: 0.06 K/UL (ref 0–0.2)
BASOPHILS NFR BLD AUTO: 0.4 % (ref 0–1)
BUN SERPL-MCNC: 5 MG/DL (ref 7–18)
BUN/CREAT SERPL: 7 (ref 6–20)
CALCIUM SERPL-MCNC: 8.2 MG/DL (ref 8.5–10.1)
CHLORIDE SERPL-SCNC: 105 MMOL/L (ref 98–107)
CO2 SERPL-SCNC: 22 MMOL/L (ref 21–32)
CREAT SERPL-MCNC: 0.68 MG/DL (ref 0.55–1.02)
DIFFERENTIAL METHOD BLD: ABNORMAL
EGFR (NO RACE VARIABLE) (RUSH/TITUS): 108 ML/MIN/1.73M²
EOSINOPHIL # BLD AUTO: 0.02 K/UL (ref 0–0.5)
EOSINOPHIL NFR BLD AUTO: 0.1 % (ref 1–4)
ERYTHROCYTE [DISTWIDTH] IN BLOOD BY AUTOMATED COUNT: 16.5 % (ref 11.5–14.5)
GLUCOSE SERPL-MCNC: 85 MG/DL (ref 74–106)
HADV DNA NPH QL NAA+NON-PROBE: NOT DETECTED
HCT VFR BLD AUTO: 31.7 % (ref 38–47)
HGB BLD-MCNC: 9.6 G/DL (ref 12–16)
HMPV RNA NPH QL NAA+NON-PROBE: NOT DETECTED
IMM GRANULOCYTES # BLD AUTO: 0.06 K/UL (ref 0–0.04)
IMM GRANULOCYTES NFR BLD: 0.4 % (ref 0–0.4)
INFLUENZA A (SUBTYPE H1): NOT DETECTED
INFLUENZA A (SUBTYPE H3): NOT DETECTED
INFLUENZA A (VERIGENE): NOT DETECTED
INFLUENZA B (VERIGENE): NOT DETECTED
LYMPHOCYTES # BLD AUTO: 2.08 K/UL (ref 1–4.8)
LYMPHOCYTES NFR BLD AUTO: 15.5 % (ref 27–41)
MAGNESIUM SERPL-MCNC: 2 MG/DL (ref 1.7–2.3)
MCH RBC QN AUTO: 23.5 PG (ref 27–31)
MCHC RBC AUTO-ENTMCNC: 30.3 G/DL (ref 32–36)
MCV RBC AUTO: 77.7 FL (ref 80–96)
MONOCYTES # BLD AUTO: 0.83 K/UL (ref 0–0.8)
MONOCYTES NFR BLD AUTO: 6.2 % (ref 2–6)
MPC BLD CALC-MCNC: 10.9 FL (ref 9.4–12.4)
NEUTROPHILS # BLD AUTO: 10.36 K/UL (ref 1.8–7.7)
NEUTROPHILS NFR BLD AUTO: 77.4 % (ref 53–65)
NRBC # BLD AUTO: 0 X10E3/UL
NRBC, AUTO (.00): 0 %
PARAINFLUENZA 1: NOT DETECTED
PARAINFLUENZA 2: NOT DETECTED
PARAINFLUENZA 3: NOT DETECTED
PARAINFLUENZA 4: NOT DETECTED
PLATELET # BLD AUTO: 350 K/UL (ref 150–400)
POTASSIUM SERPL-SCNC: 3.3 MMOL/L (ref 3.5–5.1)
RBC # BLD AUTO: 4.08 M/UL (ref 4.2–5.4)
RESPIRATORY SYNCYTIAL VIRUS A: NOT DETECTED
RESPIRATORY SYNCYTIAL VIRUS B: NOT DETECTED
RHINOVIRUS: NOT DETECTED
SARS-COV-2 RDRP RESP QL NAA+PROBE: NEGATIVE
SODIUM SERPL-SCNC: 138 MMOL/L (ref 136–145)
TSH SERPL DL<=0.005 MIU/L-ACNC: 1.97 UIU/ML (ref 0.36–3.74)
WBC # BLD AUTO: 13.41 K/UL (ref 4.5–11)

## 2022-12-04 PROCEDURE — 93005 ELECTROCARDIOGRAM TRACING: CPT

## 2022-12-04 PROCEDURE — 99220 PR INITIAL OBSERVATION CARE,LEVL III: CPT | Mod: ,,, | Performed by: HOSPITALIST

## 2022-12-04 PROCEDURE — 25000003 PHARM REV CODE 250: Performed by: HOSPITALIST

## 2022-12-04 PROCEDURE — C9113 INJ PANTOPRAZOLE SODIUM, VIA: HCPCS | Performed by: HOSPITALIST

## 2022-12-04 PROCEDURE — 96375 TX/PRO/DX INJ NEW DRUG ADDON: CPT

## 2022-12-04 PROCEDURE — 82009 KETONE BODYS QUAL: CPT | Performed by: HOSPITALIST

## 2022-12-04 PROCEDURE — 93010 ELECTROCARDIOGRAM REPORT: CPT | Mod: ,,, | Performed by: HOSPITALIST

## 2022-12-04 PROCEDURE — 99499 UNLISTED E&M SERVICE: CPT | Mod: ,,, | Performed by: HOSPITALIST

## 2022-12-04 PROCEDURE — 99220 PR INITIAL OBSERVATION CARE,LEVL III: ICD-10-PCS | Mod: ,,, | Performed by: HOSPITALIST

## 2022-12-04 PROCEDURE — 93010 EKG 12-LEAD: ICD-10-PCS | Mod: ,,, | Performed by: HOSPITALIST

## 2022-12-04 PROCEDURE — 80048 BASIC METABOLIC PNL TOTAL CA: CPT | Performed by: HOSPITALIST

## 2022-12-04 PROCEDURE — G0378 HOSPITAL OBSERVATION PER HR: HCPCS

## 2022-12-04 PROCEDURE — 87633 RESP VIRUS 12-25 TARGETS: CPT | Performed by: HOSPITALIST

## 2022-12-04 PROCEDURE — 63600175 PHARM REV CODE 636 W HCPCS: Performed by: HOSPITALIST

## 2022-12-04 PROCEDURE — 36415 COLL VENOUS BLD VENIPUNCTURE: CPT | Performed by: HOSPITALIST

## 2022-12-04 PROCEDURE — 84443 ASSAY THYROID STIM HORMONE: CPT | Performed by: HOSPITALIST

## 2022-12-04 PROCEDURE — 85025 COMPLETE CBC W/AUTO DIFF WBC: CPT | Performed by: HOSPITALIST

## 2022-12-04 PROCEDURE — 83735 ASSAY OF MAGNESIUM: CPT | Performed by: HOSPITALIST

## 2022-12-04 PROCEDURE — 99499 NO LOS: ICD-10-PCS | Mod: ,,, | Performed by: HOSPITALIST

## 2022-12-04 PROCEDURE — 87635 SARS-COV-2 COVID-19 AMP PRB: CPT | Performed by: HOSPITALIST

## 2022-12-04 RX ORDER — SIMETHICONE 80 MG
1 TABLET,CHEWABLE ORAL 3 TIMES DAILY PRN
Status: DISCONTINUED | OUTPATIENT
Start: 2022-12-04 | End: 2022-12-05 | Stop reason: HOSPADM

## 2022-12-04 RX ORDER — DIPHENHYDRAMINE HYDROCHLORIDE 50 MG/ML
50 INJECTION INTRAMUSCULAR; INTRAVENOUS ONCE
Status: DISCONTINUED | OUTPATIENT
Start: 2022-12-04 | End: 2022-12-05 | Stop reason: HOSPADM

## 2022-12-04 RX ORDER — SODIUM CHLORIDE 9 MG/ML
INJECTION, SOLUTION INTRAVENOUS CONTINUOUS
Status: DISCONTINUED | OUTPATIENT
Start: 2022-12-04 | End: 2022-12-05 | Stop reason: HOSPADM

## 2022-12-04 RX ORDER — TRAZODONE HYDROCHLORIDE 50 MG/1
50 TABLET ORAL NIGHTLY PRN
Status: DISCONTINUED | OUTPATIENT
Start: 2022-12-04 | End: 2022-12-05 | Stop reason: HOSPADM

## 2022-12-04 RX ORDER — BISACODYL 5 MG
10 TABLET, DELAYED RELEASE (ENTERIC COATED) ORAL DAILY PRN
Status: DISCONTINUED | OUTPATIENT
Start: 2022-12-04 | End: 2022-12-05 | Stop reason: HOSPADM

## 2022-12-04 RX ORDER — PANTOPRAZOLE SODIUM 40 MG/10ML
40 INJECTION, POWDER, LYOPHILIZED, FOR SOLUTION INTRAVENOUS DAILY
Status: DISCONTINUED | OUTPATIENT
Start: 2022-12-04 | End: 2022-12-05 | Stop reason: HOSPADM

## 2022-12-04 RX ORDER — GUAIFENESIN/DEXTROMETHORPHAN 100-10MG/5
10 SYRUP ORAL EVERY 6 HOURS PRN
Status: DISCONTINUED | OUTPATIENT
Start: 2022-12-04 | End: 2022-12-05 | Stop reason: HOSPADM

## 2022-12-04 RX ORDER — ONDANSETRON 2 MG/ML
8 INJECTION INTRAMUSCULAR; INTRAVENOUS EVERY 6 HOURS PRN
Status: DISCONTINUED | OUTPATIENT
Start: 2022-12-04 | End: 2022-12-05 | Stop reason: HOSPADM

## 2022-12-04 RX ORDER — ACETAMINOPHEN 500 MG
1000 TABLET ORAL EVERY 6 HOURS PRN
Status: DISCONTINUED | OUTPATIENT
Start: 2022-12-04 | End: 2022-12-05 | Stop reason: HOSPADM

## 2022-12-04 RX ADMIN — PANTOPRAZOLE SODIUM 40 MG: 40 INJECTION, POWDER, FOR SOLUTION INTRAVENOUS at 08:12

## 2022-12-04 RX ADMIN — ACETAMINOPHEN 1000 MG: 500 TABLET ORAL at 06:12

## 2022-12-04 RX ADMIN — SODIUM CHLORIDE: 9 INJECTION, SOLUTION INTRAVENOUS at 05:12

## 2022-12-04 RX ADMIN — ACETAMINOPHEN 1000 MG: 500 TABLET ORAL at 08:12

## 2022-12-04 NOTE — ED PROVIDER NOTES
Encounter Date: 12/3/2022       History     Chief Complaint   Patient presents with    Vomiting     47-year-old female presents to the ER with continued nausea, vomiting, and abdominal pain. She states she was seen in the ER earlier for the same complaint. She was admitted to the hospital, but states after receiving fluid and medicine, she felt better. Mom states she dropped her off at home and 30 minutes later she called her back vomiting in the phone. She denies shortness of breath, diarrhea. She She also endorses upper gastric pain. Review of records notes CT of abdomen done that was unremarkable. Urinalysis noted ketones.    The history is provided by the patient. No  was used.   Emesis   This is a new problem. The current episode started several days ago. The problem occurs constantly. The problem has been waxing and waning. Associated symptoms include abdominal pain and diarrhea. Pertinent negatives include no arthralgias, no chills, no cough, no fever and no myalgias.   Review of patient's allergies indicates:   Allergen Reactions    Ace inhibitors Swelling    Penicillins     Rocephin [ceftriaxone]      Past Medical History:   Diagnosis Date    Hypertension      Past Surgical History:   Procedure Laterality Date    TONSILLECTOMY       History reviewed. No pertinent family history.  Social History     Tobacco Use    Smoking status: Unknown     Review of Systems   Constitutional:  Negative for chills and fever.   HENT:  Negative for congestion, sinus pressure and sinus pain.    Respiratory:  Negative for cough and shortness of breath.    Cardiovascular:  Negative for chest pain and palpitations.   Gastrointestinal:  Positive for abdominal pain, diarrhea, nausea and vomiting.   Genitourinary:  Positive for vaginal bleeding. Negative for dysuria.   Musculoskeletal:  Negative for arthralgias and myalgias.   Skin:  Negative for color change and wound.   Allergic/Immunologic: Negative for  environmental allergies and food allergies.   Neurological:  Negative for dizziness and weakness.   Hematological:  Negative for adenopathy. Does not bruise/bleed easily.   Psychiatric/Behavioral:  Negative for agitation and confusion.      Physical Exam     Initial Vitals   BP Pulse Resp Temp SpO2   12/03/22 1925 12/03/22 1926 12/03/22 1925 12/03/22 1925 12/03/22 1926   (!) 199/90 77 18 99 °F (37.2 °C) 100 %      MAP       --                Physical Exam    Nursing note and vitals reviewed.  Constitutional: She appears well-developed and well-nourished.   HENT:   Head: Normocephalic and atraumatic.   Eyes: EOM are normal. Pupils are equal, round, and reactive to light.   Neck: Neck supple. Thyromegaly present.   Normal range of motion.  Cardiovascular:  Normal rate.           No murmur heard.  Pulmonary/Chest: She has no wheezes. She has no rhonchi.   Abdominal: Abdomen is soft. She exhibits no distension. There is abdominal tenderness in the epigastric area.   Musculoskeletal:         General: No tenderness or edema.      Cervical back: Normal range of motion and neck supple.     Neurological: She is alert and oriented to person, place, and time. No cranial nerve deficit or sensory deficit.   Skin: Skin is warm and dry. Capillary refill takes less than 2 seconds.   Psychiatric: Judgment and thought content normal.       Medical Screening Exam   See Full Note    ED Course   Procedures  Labs Reviewed   URINALYSIS, REFLEX TO URINE CULTURE - Abnormal; Notable for the following components:       Result Value    Protein, UA 30 (*)     Ketones, UA 80 (*)     Blood, UA Moderate (*)     Specific Gravity, UA 1.038 (*)     All other components within normal limits   URINALYSIS, MICROSCOPIC - Abnormal; Notable for the following components:    RBC, UA 15-25 (*)     Bacteria, UA Occasional (*)     Yeast, UA Few (*)     Squamous Epithelial Cells, UA Few (*)     All other components within normal limits   SARS-COV-2 RNA  AMPLIFICATION, QUAL - Normal    Narrative:     Negative SARS-CoV results should not be used as the sole basis for treatment or patient management decisions; negative results should be considered in the context of a patient's recent exposures, history and the presene of clinical signs and symptoms consistent with COVID-19.  Negative results should be treated as presumptive and confirmed by molecular assay, if necessary for patient management.        ECG Results              EKG 12-lead (Final result)  Result time 12/04/22 22:36:26      Final result by Interface, Lab In [facility] (12/04/22 22:36:26)                   Narrative:    Test Reason : R06.02,    Vent. Rate : 081 BPM     Atrial Rate : 081 BPM     P-R Int : 168 ms          QRS Dur : 072 ms      QT Int : 420 ms       P-R-T Axes : 075 023 002 degrees     QTc Int : 487 ms    Normal sinus rhythm with sinus arrhythmia  Septal infarct ,age undetermined  Abnormal ECG  No previous ECGs available  Confirmed by Edgardo CAIN, Janet DISLA ([id number]) on 12/4/2022 10:36:10 PM    Referred By: [email or web address]   SELF           Confirmed By:Janet Reynoso MD                                  Imaging Results              X-Ray Chest AP Portable (Final result)  Result time 12/04/22 09:25:16      Final result by Chapin George MD (12/04/22 09:25:16)                   Impression:      No acute cardiopulmonary process      Electronically signed by: Chapin George  Date:    12/04/2022  Time:    09:25               Narrative:    EXAMINATION:  XR CHEST AP PORTABLE    CLINICAL HISTORY:  Shortness of breath    COMPARISON:  April 6, 2020    TECHNIQUE:  Chest x-ray AP    FINDINGS:  The cardiomediastinal silhouette and pulmonary vasculature are unremarkable.  Lungs and pleural spaces are clear.  Osseous structures are unchanged                                       X-Ray KUB (Final result)  Result time 12/04/22 09:50:36      Final result by Chapin George MD (12/04/22 09:50:36)                    Impression:      No definite acute process      Electronically signed by: Chapin George  Date:    12/04/2022  Time:    09:50               Narrative:    EXAMINATION:  XR KUB    CLINICAL HISTORY:  Nausea and vomiting    COMPARISON:  Abdominal x-ray November 17, 2018    TECHNIQUE:  AP supine abdomen    FINDINGS:  Bowel gas pattern is nonobstructive.  There is mild to moderate retained stool in the colon.  There is no definite radiopaque calculus.    No acute osseous abnormality                                       Medications   sodium chloride 0.9% bolus 1,000 mL (0 mLs Intravenous Stopped 12/3/22 2157)   promethazine suppository 25 mg (25 mg Rectal Given 12/3/22 2010)   metoclopramide HCl injection 10 mg (10 mg Intravenous Given 12/3/22 2010)   famotidine (PF) injection 20 mg (20 mg Intravenous Given 12/3/22 2010)   sodium chloride 0.9% bolus 1,000 mL (0 mLs Intravenous Stopped 12/3/22 2259)   promethazine (PHENERGAN) 25 mg in dextrose 5 % 50 mL IVPB (0 mg Intravenous Stopped 12/3/22 2157)   lactated ringers bolus 1,000 mL (0 mLs Intravenous Stopped 12/4/22 0009)   potassium chloride SA CR tablet 40 mEq (40 mEq Oral Given 12/5/22 1414)           APC / Resident Notes:   Record reviewed with Dr. Zamora. Plan to administer IV fluids and give medicines to resolve nausea with PO challenge.            Clinical Impression:   Final diagnoses:  [R11.2] Intractable nausea and vomiting  [R06.02] SOB (shortness of breath)  [I10] Primary hypertension [I10 (ICD-10-CM)] (Primary)  [E86.0, E87.1] Dehydration with hyponatremia [E86.0, E87.1 (ICD-10-CM)]      ED Disposition Condition    Observation                 PIOTR James  12/07/22 1238

## 2022-12-04 NOTE — SUBJECTIVE & OBJECTIVE
Past Medical History:   Diagnosis Date    Hypertension        Past Surgical History:   Procedure Laterality Date    TONSILLECTOMY         Review of patient's allergies indicates:   Allergen Reactions    Ace inhibitors Swelling    Penicillins     Rocephin [ceftriaxone]        Current Facility-Administered Medications on File Prior to Encounter   Medication    [COMPLETED] iopamidoL (ISOVUE-370) injection 100 mL    [COMPLETED] ondansetron injection 4 mg    [COMPLETED] sodium chloride 0.9% bolus 1,000 mL    [COMPLETED] sodium chloride 0.9% bolus 1,000 mL     Current Outpatient Medications on File Prior to Encounter   Medication Sig    ascorbic acid, vitamin C, (VITAMIN C) 1000 MG tablet Take 1,000 mg by mouth once daily.    ergocalciferol (VITAMIN D2) 50,000 unit Cap Take 50,000 Units by mouth every 7 days.    famotidine (PEPCID) 20 MG tablet Take 1 tablet (20 mg total) by mouth 2 (two) times daily. for 7 days    hydrOXYzine HCL (ATARAX) 25 MG tablet Take 1 tablet (25 mg total) by mouth every 8 (eight) hours. Take one tab PO every 8 hours x2 days.  Then take 1 tab PO every 8 hours as needed for swelling or other signs of allergic reaction.    ondansetron (ZOFRAN-ODT) 4 MG TbDL Take 1 tablet (4 mg total) by mouth 2 (two) times daily.    [DISCONTINUED] lisinopriL-hydrochlorothiazide (PRINZIDE,ZESTORETIC) 20-25 mg Tab Take 1 tablet by mouth once daily.     Family History    None       Tobacco Use    Smoking status: Unknown    Smokeless tobacco: Not on file   Substance and Sexual Activity    Alcohol use: Not on file    Drug use: Not on file    Sexual activity: Not on file     Review of Systems   Constitutional:  Negative for appetite change, chills, fatigue, fever and unexpected weight change.   HENT:  Negative for congestion, mouth sores, nosebleeds, sinus pain, sore throat and trouble swallowing.    Respiratory:  Positive for shortness of breath. Negative for apnea, cough and chest tightness.    Cardiovascular:  Negative  for chest pain, palpitations and leg swelling.   Gastrointestinal:  Positive for abdominal pain, nausea and vomiting. Negative for blood in stool, constipation and diarrhea.   Genitourinary:  Negative for decreased urine volume, difficulty urinating, dysuria and frequency.   Musculoskeletal:  Negative for arthralgias, back pain and neck pain.   Skin:  Negative for rash.   Neurological:  Negative for syncope, light-headedness and headaches.   Hematological:  Does not bruise/bleed easily.   Psychiatric/Behavioral:  Negative for confusion and suicidal ideas.    Objective:     Vital Signs (Most Recent):  Temp: 99 °F (37.2 °C) (12/03/22 1925)  Pulse: 88 (12/03/22 2219)  Resp: 18 (12/03/22 1925)  BP: (!) 188/85 (12/03/22 2219)  SpO2: 97 % (12/03/22 2219)   Vital Signs (24h Range):  Temp:  [98.5 °F (36.9 °C)-99 °F (37.2 °C)] 99 °F (37.2 °C)  Pulse:  [] 88  Resp:  [18] 18  SpO2:  [97 %-100 %] 97 %  BP: (125-199)/(85-90) 188/85     Weight: 80.7 kg (178 lb)  Body mass index is 31.53 kg/m².    Physical Exam  Vitals and nursing note reviewed. Exam conducted with a chaperone present.   Constitutional:       General: She is in acute distress.      Appearance: Normal appearance. She is ill-appearing and toxic-appearing. She is not diaphoretic.   HENT:      Head: Atraumatic.      Mouth/Throat:      Mouth: Mucous membranes are moist.      Pharynx: Oropharynx is clear.   Eyes:      Conjunctiva/sclera: Conjunctivae normal.      Pupils: Pupils are equal, round, and reactive to light.   Neck:      Vascular: Carotid bruit present.   Cardiovascular:      Rate and Rhythm: Normal rate and regular rhythm.      Pulses: Normal pulses.      Heart sounds: Normal heart sounds.   Pulmonary:      Effort: Pulmonary effort is normal.      Breath sounds: Normal breath sounds.   Abdominal:      General: Abdomen is flat. Bowel sounds are normal. There is no distension.      Palpations: Abdomen is soft.      Tenderness: There is no abdominal  tenderness. There is no guarding.   Musculoskeletal:         General: No deformity or signs of injury. Normal range of motion.      Cervical back: Normal range of motion and neck supple.      Right lower leg: Edema present.      Left lower leg: No edema.   Skin:     General: Skin is warm and dry.      Capillary Refill: Capillary refill takes less than 2 seconds.      Coloration: Skin is not jaundiced or pale.      Findings: No bruising, lesion or rash.   Neurological:      General: No focal deficit present.      Mental Status: She is alert and oriented to person, place, and time.   Psychiatric:         Mood and Affect: Mood normal.         CRANIAL NERVES     CN III, IV, VI   Pupils are equal, round, and reactive to light.     Significant Labs: All pertinent labs within the past 24 hours have been reviewed.    Significant Imaging: I have reviewed all pertinent imaging results/findings within the past 24 hours.

## 2022-12-04 NOTE — ASSESSMENT & PLAN NOTE
Etiology is unclear.  She does smoke tobacco but she does not use marijuana which could contribute to cyclical vomiting.

## 2022-12-04 NOTE — SUBJECTIVE & OBJECTIVE
Interval History:     Review of Systems   Constitutional:  Negative for activity change, chills, fatigue and fever.   HENT:  Negative for congestion and sore throat.    Respiratory:  Negative for chest tightness.    Gastrointestinal:  Positive for diarrhea, nausea and vomiting. Negative for abdominal distention and abdominal pain.   Endocrine: Negative for cold intolerance and heat intolerance.   Genitourinary:  Negative for dysuria.   Musculoskeletal:  Negative for arthralgias and back pain.   Skin:  Negative for color change and rash.   Neurological:  Negative for dizziness, tremors and headaches.   Psychiatric/Behavioral:  Negative for agitation. The patient is not nervous/anxious.    Objective:     Vital Signs (Most Recent):  Temp: 99 °F (37.2 °C) (12/04/22 0925)  Pulse: 65 (12/04/22 0925)  Resp: 18 (12/04/22 0925)  BP: 121/68 (12/04/22 0925)  SpO2: 98 % (12/04/22 0925) Vital Signs (24h Range):  Temp:  [49.5 °F (9.7 °C)-99.2 °F (37.3 °C)] 99 °F (37.2 °C)  Pulse:  [] 65  Resp:  [18] 18  SpO2:  [97 %-100 %] 98 %  BP: (121-199)/(68-93) 121/68     Weight: 80.7 kg (178 lb)  Body mass index is 31.54 kg/m².    Intake/Output Summary (Last 24 hours) at 12/4/2022 1115  Last data filed at 12/4/2022 0009  Gross per 24 hour   Intake 2750 ml   Output --   Net 2750 ml      Physical Exam  Constitutional:       Appearance: Normal appearance.   HENT:      Head: Normocephalic and atraumatic.      Nose: Nose normal.      Mouth/Throat:      Mouth: Mucous membranes are moist.      Pharynx: Oropharynx is clear.   Eyes:      Extraocular Movements: Extraocular movements intact.      Conjunctiva/sclera: Conjunctivae normal.      Pupils: Pupils are equal, round, and reactive to light.   Cardiovascular:      Rate and Rhythm: Normal rate and regular rhythm.      Pulses: Normal pulses.      Heart sounds: Normal heart sounds.   Pulmonary:      Effort: Pulmonary effort is normal.      Breath sounds: Normal breath sounds.   Abdominal:       General: Abdomen is flat. Bowel sounds are normal.      Palpations: Abdomen is soft.   Musculoskeletal:         General: Normal range of motion.      Cervical back: Normal range of motion and neck supple.   Skin:     General: Skin is warm and dry.   Neurological:      General: No focal deficit present.      Mental Status: She is alert and oriented to person, place, and time.   Psychiatric:         Mood and Affect: Mood normal.         Behavior: Behavior normal.       Significant Labs: All pertinent labs within the past 24 hours have been reviewed.    Significant Imaging: I have reviewed all pertinent imaging results/findings within the past 24 hours.

## 2022-12-04 NOTE — PROGRESS NOTES
[facility]  [facility] Medicine  Progress Note    Patient Name: Martita Hilliard  MRN: [id number]  Patient Class: OP- Observation   Admission Date: 12/3/2022  Length of Stay: 0 days  Attending Physician: Michelle Price MD  Primary Care Provider: Primary Doctor No        Subjective:     Principal Problem:Dehydration with hyponatremia    HPI:  46 yo F presents to ED with four days of N/V.  Patient states that she was not having abdominal pain or SOB until several days after she began vomiting.  She has no GUEVARA or orthopnea.  Lungs are clear and abd soft and NT but she looks ill.  She came earlier today and was going to be admitted due to significant ketones in her urine and dehydration but she felt better after the zofran and 2L of IVF and wanted to try to go home but she has continued to vomit throughout the evening and has returned to the ED.      ROS as below.  I have reviewed Dr. Price's consult earlier and reviewed labs.        Overview/Hospital Course:  12/4: Patient now returns to the hospital with persistent nausea vomiting.  She presented emergency room yesterday and then decided to be discharged by the ER physician.      Interval History:     Review of Systems   Constitutional:  Negative for activity change, chills, fatigue and fever.   HENT:  Negative for congestion and sore throat.    Respiratory:  Negative for chest tightness.    Gastrointestinal:  Positive for diarrhea, nausea and vomiting. Negative for abdominal distention and abdominal pain.   Endocrine: Negative for cold intolerance and heat intolerance.   Genitourinary:  Negative for dysuria.   Musculoskeletal:  Negative for arthralgias and back pain.   Skin:  Negative for color change and rash.   Neurological:  Negative for dizziness, tremors and headaches.   Psychiatric/Behavioral:  Negative for agitation. The patient is not nervous/anxious.    Objective:     Vital Signs (Most Recent):  Temp: 99 °F (37.2 °C) (12/04/22  0925)  Pulse: 65 (12/04/22 0925)  Resp: 18 (12/04/22 0925)  BP: 121/68 (12/04/22 0925)  SpO2: 98 % (12/04/22 0925) Vital Signs (24h Range):  Temp:  [49.5 °F (9.7 °C)-99.2 °F (37.3 °C)] 99 °F (37.2 °C)  Pulse:  [] 65  Resp:  [18] 18  SpO2:  [97 %-100 %] 98 %  BP: (121-199)/(68-93) 121/68     Weight: 80.7 kg (178 lb)  Body mass index is 31.54 kg/m².    Intake/Output Summary (Last 24 hours) at 12/4/2022 1115  Last data filed at 12/4/2022 0009  Gross per 24 hour   Intake 2750 ml   Output --   Net 2750 ml      Physical Exam  Constitutional:       Appearance: Normal appearance.   HENT:      Head: Normocephalic and atraumatic.      Nose: Nose normal.      Mouth/Throat:      Mouth: Mucous membranes are moist.      Pharynx: Oropharynx is clear.   Eyes:      Extraocular Movements: Extraocular movements intact.      Conjunctiva/sclera: Conjunctivae normal.      Pupils: Pupils are equal, round, and reactive to light.   Cardiovascular:      Rate and Rhythm: Normal rate and regular rhythm.      Pulses: Normal pulses.      Heart sounds: Normal heart sounds.   Pulmonary:      Effort: Pulmonary effort is normal.      Breath sounds: Normal breath sounds.   Abdominal:      General: Abdomen is flat. Bowel sounds are normal.      Palpations: Abdomen is soft.   Musculoskeletal:         General: Normal range of motion.      Cervical back: Normal range of motion and neck supple.   Skin:     General: Skin is warm and dry.   Neurological:      General: No focal deficit present.      Mental Status: She is alert and oriented to person, place, and time.   Psychiatric:         Mood and Affect: Mood normal.         Behavior: Behavior normal.       Significant Labs: All pertinent labs within the past 24 hours have been reviewed.    Significant Imaging: I have reviewed all pertinent imaging results/findings within the past 24 hours.      Assessment/Plan:      * Dehydration with hyponatremia  Intractable nausea and vomiting with urine ketones.     Bowel rest, IV antiemetics and IVF.  Patient with ketouria   Will check for serum acetone and starvation ketosis.    If develops diarrhea consider stool culture and consider salmonella.  If after 24 h still unable to tolerate PO, would consider eval for upper endoscopy to r/o gastric outlet obstruction.    Recheck labs in am.  Leukocytosis could be stress margination or from infectious gastroenteritis.    Replace electrolytes as indicated.    Patient's SOB has resolved but will check cxr for aspiration and KUB to eval for ileus.    CT abdomen and pelvis done earlier reviewed and unremarkable.     Tobacco use  Patient counseled against tobacco use and nicotine patch ordered.    Will discuss with patient was use marijuana as marijuana may contribute to cyclical vomiting.        Shortness of breath  Patient endorses shortness of breath and lower extremity edema.  She also reported palpitations.    Etiology unclear but there is a history of tobacco use and BNP is elevated.  Echocardiogram was ordered.  Given patient's age and constellation of signs query if she had rheumatologic disease.  She does not currently have insurance for further workup for possible rheumatologic disease should be a high priority.      Intractable nausea and vomiting  Etiology is unclear.  She does smoke tobacco but she does not use marijuana which could contribute to cyclical vomiting.      Hypertension  Previously on lisinopril but had angioedema and discontinued.  No meds at this time.          VTE Risk Mitigation (From admission, onward)         Ordered     Place LEXI hose  Until discontinued         12/04/22 8323                Discharge Planning   SHALINI:      Code Status: Full Code   Is the patient medically ready for discharge?:     Reason for patient still in hospital (select all that apply): Treatment                     Michelle Price MD  Department of Hospital Medicine   [facility]

## 2022-12-04 NOTE — HPI
46 yo F presents to ED with four days of N/V.  Patient states that she was not having abdominal pain or SOB until several days after she began vomiting.  She has no GUEVARA or orthopnea.  Lungs are clear and abd soft and NT but she looks ill.  She came earlier today and was going to be admitted due to significant ketones in her urine and dehydration but she felt better after the zofran and 2L of IVF and wanted to try to go home but she has continued to vomit throughout the evening and has returned to the ED.      ROS as below.  I have reviewed Dr. Price's consult earlier and reviewed labs.

## 2022-12-04 NOTE — ASSESSMENT & PLAN NOTE
Patient counseled against tobacco use and nicotine patch ordered.    Will discuss with patient was use marijuana as marijuana may contribute to cyclical vomiting.

## 2022-12-04 NOTE — HOSPITAL COURSE
12/4: Patient now returns to the hospital with persistent nausea vomiting.  She presented emergency room yesterday and then decided to be discharged by the ER physician.    12/5: Patient denies any nausea or vomiting today. Was sitting up in bed eating breakfast tray during rounds. Admits to using THC vape pen. Discussed with patient that this can cause cyclical vomiting. She stated that she was using that to stimulate her appetite. Advised on risks associated with this and cessation advised. Application for care at [facility] was given to patient and reiterated importance of completing application as soon as possible to be seen there as a patient. Potassium 3.3 today, replaced with 40 mEq PO potassium prior to d/c. Discussed with patient heart healthy diet. ECHO still pending official read but prelim EF 60%. BNP was mildly elevated to 400. Will need to follow up outpatient to follow any progression of this.     Patient counseled on the importance of keeping all hospital follow up appointments and compliance with medications, therapies, or devices as prescribed in order to provide for the best health outcomes and decrease the risk of hospital readmission. Additionally, patient given written literature regarding their current disease processes and home care recommendations. Patient given opportunity to ask questions and verbalized understanding of all information discussed.

## 2022-12-04 NOTE — ASSESSMENT & PLAN NOTE
Intractable nausea and vomiting with urine ketones.    Bowel rest, IV antiemetics and IVF.  Patient with ketouria   Will check for serum acetone and starvation ketosis.    If develops diarrhea consider stool culture and consider salmonella.  If after 24 h still unable to tolerate PO, would consider eval for upper endoscopy to r/o gastric outlet obstruction.    Recheck labs in am.  Leukocytosis could be stress margination or from infectious gastroenteritis.    Replace electrolytes as indicated.    Patient's SOB has resolved but will check cxr for aspiration and KUB to eval for ileus.    CT abdomen and pelvis done earlier reviewed and unremarkable.

## 2022-12-04 NOTE — ASSESSMENT & PLAN NOTE
Patient endorses shortness of breath and lower extremity edema.  She also reported palpitations.    Etiology unclear but there is a history of tobacco use and BNP is elevated.  Echocardiogram was ordered.  Given patient's age and constellation of signs query if she had rheumatologic disease.  She does not currently have insurance for further workup for possible rheumatologic disease should be a high priority.

## 2022-12-04 NOTE — H&P
[facility]  [facility]  History & Physical    Patient Name: Martita Hilliard  MRN: [id number]  Patient Class: OP- Observation  Admission Date: 12/3/2022  Attending Physician: Michelle Price MD   Primary Care Provider: Primary Doctor No         Patient information was obtained from patient, past medical records and ER records.     Subjective:     Principal Problem:Dehydration with hyponatremia    Chief Complaint:   Chief Complaint   Patient presents with    Vomiting        HPI: 48 yo F presents to ED with four days of N/V.  Patient states that she was not having abdominal pain or SOB until several days after she began vomiting.  She has no GUEVARA or orthopnea.  Lungs are clear and abd soft and NT but she looks ill.  She came earlier today and was going to be admitted due to significant ketones in her urine and dehydration but she felt better after the zofran and 2L of IVF and wanted to try to go home but she has continued to vomit throughout the evening and has returned to the ED.      ROS as below.  I have reviewed Dr. Price's consult earlier and reviewed labs.        Past Medical History:   Diagnosis Date    Hypertension        Past Surgical History:   Procedure Laterality Date    TONSILLECTOMY         Review of patient's allergies indicates:   Allergen Reactions    Ace inhibitors Swelling    Penicillins     Rocephin [ceftriaxone]        Current Facility-Administered Medications on File Prior to Encounter   Medication    [COMPLETED] iopamidoL (ISOVUE-370) injection 100 mL    [COMPLETED] ondansetron injection 4 mg    [COMPLETED] sodium chloride 0.9% bolus 1,000 mL    [COMPLETED] sodium chloride 0.9% bolus 1,000 mL     Current Outpatient Medications on File Prior to Encounter   Medication Sig    ascorbic acid, vitamin C, (VITAMIN C) 1000 MG tablet Take 1,000 mg by mouth once daily.    ergocalciferol (VITAMIN D2) 50,000 unit Cap Take 50,000 Units by mouth every 7 days.    famotidine  (PEPCID) 20 MG tablet Take 1 tablet (20 mg total) by mouth 2 (two) times daily. for 7 days    hydrOXYzine HCL (ATARAX) 25 MG tablet Take 1 tablet (25 mg total) by mouth every 8 (eight) hours. Take one tab PO every 8 hours x2 days.  Then take 1 tab PO every 8 hours as needed for swelling or other signs of allergic reaction.    ondansetron (ZOFRAN-ODT) 4 MG TbDL Take 1 tablet (4 mg total) by mouth 2 (two) times daily.    [DISCONTINUED] lisinopriL-hydrochlorothiazide (PRINZIDE,ZESTORETIC) 20-25 mg Tab Take 1 tablet by mouth once daily.     Family History    None       Tobacco Use    Smoking status: Unknown    Smokeless tobacco: Not on file   Substance and Sexual Activity    Alcohol use: Not on file    Drug use: Not on file    Sexual activity: Not on file     Review of Systems   Constitutional:  Negative for appetite change, chills, fatigue, fever and unexpected weight change.   HENT:  Negative for congestion, mouth sores, nosebleeds, sinus pain, sore throat and trouble swallowing.    Respiratory:  Positive for shortness of breath. Negative for apnea, cough and chest tightness.    Cardiovascular:  Negative for chest pain, palpitations and leg swelling.   Gastrointestinal:  Positive for abdominal pain, nausea and vomiting. Negative for blood in stool, constipation and diarrhea.   Genitourinary:  Negative for decreased urine volume, difficulty urinating, dysuria and frequency.   Musculoskeletal:  Negative for arthralgias, back pain and neck pain.   Skin:  Negative for rash.   Neurological:  Negative for syncope, light-headedness and headaches.   Hematological:  Does not bruise/bleed easily.   Psychiatric/Behavioral:  Negative for confusion and suicidal ideas.    Objective:     Vital Signs (Most Recent):  Temp: 99 °F (37.2 °C) (12/03/22 1925)  Pulse: 88 (12/03/22 2219)  Resp: 18 (12/03/22 1925)  BP: (!) 188/85 (12/03/22 2219)  SpO2: 97 % (12/03/22 2219)   Vital Signs (24h Range):  Temp:  [98.5 °F (36.9 °C)-99 °F (37.2 °C)]  99 °F (37.2 °C)  Pulse:  [] 88  Resp:  [18] 18  SpO2:  [97 %-100 %] 97 %  BP: (125-199)/(85-90) 188/85     Weight: 80.7 kg (178 lb)  Body mass index is 31.53 kg/m².    Physical Exam  Vitals and nursing note reviewed. Exam conducted with a chaperone present.   Constitutional:       General: She is in acute distress.      Appearance: Normal appearance. She is ill-appearing and toxic-appearing. She is not diaphoretic.   HENT:      Head: Atraumatic.      Mouth/Throat:      Mouth: Mucous membranes are moist.      Pharynx: Oropharynx is clear.   Eyes:      Conjunctiva/sclera: Conjunctivae normal.      Pupils: Pupils are equal, round, and reactive to light.   Neck:      Vascular: Carotid bruit present.   Cardiovascular:      Rate and Rhythm: Normal rate and regular rhythm.      Pulses: Normal pulses.      Heart sounds: Normal heart sounds.   Pulmonary:      Effort: Pulmonary effort is normal.      Breath sounds: Normal breath sounds.   Abdominal:      General: Abdomen is flat. Bowel sounds are normal. There is no distension.      Palpations: Abdomen is soft.      Tenderness: There is no abdominal tenderness. There is no guarding.   Musculoskeletal:         General: No deformity or signs of injury. Normal range of motion.      Cervical back: Normal range of motion and neck supple.      Right lower leg: Edema present.      Left lower leg: No edema.   Skin:     General: Skin is warm and dry.      Capillary Refill: Capillary refill takes less than 2 seconds.      Coloration: Skin is not jaundiced or pale.      Findings: No bruising, lesion or rash.   Neurological:      General: No focal deficit present.      Mental Status: She is alert and oriented to person, place, and time.   Psychiatric:         Mood and Affect: Mood normal.         CRANIAL NERVES     CN III, IV, VI   Pupils are equal, round, and reactive to light.     Significant Labs: All pertinent labs within the past 24 hours have been reviewed.    Significant  Imaging: I have reviewed all pertinent imaging results/findings within the past 24 hours.    Assessment/Plan:     * Dehydration with hyponatremia  Intractable nausea and vomiting with urine ketones.    Bowel rest, IV antiemetics and IVF.  Patient with ketouria   Will check for serum acetone and starvation ketosis.    If develops diarrhea consider stool culture and consider salmonella.  If after 24 h still unable to tolerate PO, would consider eval for upper endoscopy to r/o gastric outlet obstruction.    Recheck labs in am.  Leukocytosis could be stress margination or from infectious gastroenteritis.    Replace electrolytes as indicated.    Patient's SOB has resolved but will check cxr for aspiration and KUB to eval for ileus.    CT abdomen and pelvis done earlier reviewed and unremarkable.     Hypertension  Previously on lisinopril but had angioedema and discontinued.  No meds at this time.          VTE Risk Mitigation (From admission, onward)      LEXI  hose no anticoagulation in case need of procedure               Janet Reynoso MD  Department of Hospital Medicine   [facility]

## 2022-12-05 VITALS
HEIGHT: 63 IN | TEMPERATURE: 99 F | HEART RATE: 65 BPM | DIASTOLIC BLOOD PRESSURE: 74 MMHG | WEIGHT: 178 LBS | RESPIRATION RATE: 16 BRPM | BODY MASS INDEX: 31.54 KG/M2 | SYSTOLIC BLOOD PRESSURE: 142 MMHG | OXYGEN SATURATION: 95 %

## 2022-12-05 LAB
ANION GAP SERPL CALCULATED.3IONS-SCNC: 13 MMOL/L (ref 7–16)
AORTIC ROOT ANNULUS: 2.1 CM
AORTIC VALVE CUSP SEPERATION: 1.81 CM
AV INDEX (PROSTH): 0.72
AV MEAN GRADIENT: 3 MMHG
AV PEAK GRADIENT: 4 MMHG
AV VALVE AREA: 2.04 CM2
AV VELOCITY RATIO: 0.9
BASOPHILS # BLD AUTO: 0.05 K/UL (ref 0–0.2)
BASOPHILS NFR BLD AUTO: 0.6 % (ref 0–1)
BSA FOR ECHO PROCEDURE: 1.89 M2
BUN SERPL-MCNC: 5 MG/DL (ref 7–18)
BUN/CREAT SERPL: 7 (ref 6–20)
CALCIUM SERPL-MCNC: 8.7 MG/DL (ref 8.5–10.1)
CHLORIDE SERPL-SCNC: 103 MMOL/L (ref 98–107)
CO2 SERPL-SCNC: 26 MMOL/L (ref 21–32)
CREAT SERPL-MCNC: 0.76 MG/DL (ref 0.55–1.02)
CV ECHO LV RWT: 0.39 CM
DIFFERENTIAL METHOD BLD: ABNORMAL
DOP CALC AO PEAK VEL: 1 M/S
DOP CALC AO VTI: 25 CM
DOP CALC LVOT AREA: 2.8 CM2
DOP CALC LVOT DIAMETER: 1.9 CM
DOP CALC LVOT PEAK VEL: 0.9 M/S
DOP CALC LVOT STROKE VOLUME: 51.01 CM3
DOP CALCLVOT PEAK VEL VTI: 18 CM
E WAVE DECELERATION TIME: 163 MSEC
ECHO EF ESTIMATED: 60 %
ECHO LV POSTERIOR WALL: 0.88 CM (ref 0.6–1.1)
EGFR (NO RACE VARIABLE) (RUSH/TITUS): 97 ML/MIN/1.73M²
EJECTION FRACTION: 60 %
EOSINOPHIL # BLD AUTO: 0.1 K/UL (ref 0–0.5)
EOSINOPHIL NFR BLD AUTO: 1.2 % (ref 1–4)
ERYTHROCYTE [DISTWIDTH] IN BLOOD BY AUTOMATED COUNT: 16.7 % (ref 11.5–14.5)
FRACTIONAL SHORTENING: 38 % (ref 28–44)
GLUCOSE SERPL-MCNC: 93 MG/DL (ref 74–106)
HCT VFR BLD AUTO: 34.2 % (ref 38–47)
HGB BLD-MCNC: 9.9 G/DL (ref 12–16)
IMM GRANULOCYTES # BLD AUTO: 0.01 K/UL (ref 0–0.04)
IMM GRANULOCYTES NFR BLD: 0.1 % (ref 0–0.4)
INTERVENTRICULAR SEPTUM: 0.81 CM (ref 0.6–1.1)
IVC OSTIUM: 1.2 CM
LEFT ATRIUM SIZE: 3 CM
LEFT INTERNAL DIMENSION IN SYSTOLE: 2.81 CM (ref 2.1–4)
LEFT VENTRICLE DIASTOLIC VOLUME INDEX: 51.58 ML/M2
LEFT VENTRICLE DIASTOLIC VOLUME: 94.9 ML
LEFT VENTRICLE MASS INDEX: 68 G/M2
LEFT VENTRICLE SYSTOLIC VOLUME INDEX: 16.2 ML/M2
LEFT VENTRICLE SYSTOLIC VOLUME: 29.8 ML
LEFT VENTRICULAR INTERNAL DIMENSION IN DIASTOLE: 4.55 CM (ref 3.5–6)
LEFT VENTRICULAR MASS: 124.38 G
LVOT MG: 2 MMHG
LYMPHOCYTES # BLD AUTO: 2.58 K/UL (ref 1–4.8)
LYMPHOCYTES NFR BLD AUTO: 30.9 % (ref 27–41)
MCH RBC QN AUTO: 23.2 PG (ref 27–31)
MCHC RBC AUTO-ENTMCNC: 28.9 G/DL (ref 32–36)
MCV RBC AUTO: 80.3 FL (ref 80–96)
MONOCYTES # BLD AUTO: 0.47 K/UL (ref 0–0.8)
MONOCYTES NFR BLD AUTO: 5.6 % (ref 2–6)
MPC BLD CALC-MCNC: 9.9 FL (ref 9.4–12.4)
MV PEAK E VEL: 1.03 M/S
NEUTROPHILS # BLD AUTO: 5.13 K/UL (ref 1.8–7.7)
NEUTROPHILS NFR BLD AUTO: 61.6 % (ref 53–65)
NRBC # BLD AUTO: 0 X10E3/UL
NRBC, AUTO (.00): 0 %
PISA TR MAX VEL: 2.5 M/S
PLATELET # BLD AUTO: 343 K/UL (ref 150–400)
POTASSIUM SERPL-SCNC: 3.3 MMOL/L (ref 3.5–5.1)
RA MAJOR: 2.4 CM
RA PRESSURE: 3 MMHG
RBC # BLD AUTO: 4.26 M/UL (ref 4.2–5.4)
RIGHT VENTRICULAR END-DIASTOLIC DIMENSION: 3.1 CM
SODIUM SERPL-SCNC: 139 MMOL/L (ref 136–145)
TR MAX PG: 25 MMHG
TRICUSPID ANNULAR PLANE SYSTOLIC EXCURSION: 2.4 CM
TV REST PULMONARY ARTERY PRESSURE: 28 MMHG
WBC # BLD AUTO: 8.34 K/UL (ref 4.5–11)

## 2022-12-05 PROCEDURE — G0378 HOSPITAL OBSERVATION PER HR: HCPCS

## 2022-12-05 PROCEDURE — 99217 PR OBSERVATION CARE DISCHARGE: CPT | Mod: ,,, | Performed by: HOSPITALIST

## 2022-12-05 PROCEDURE — 25000003 PHARM REV CODE 250: Performed by: HOSPITALIST

## 2022-12-05 PROCEDURE — 25000003 PHARM REV CODE 250: Performed by: NURSE PRACTITIONER

## 2022-12-05 PROCEDURE — 80048 BASIC METABOLIC PNL TOTAL CA: CPT | Performed by: NURSE PRACTITIONER

## 2022-12-05 PROCEDURE — 85025 COMPLETE CBC W/AUTO DIFF WBC: CPT | Performed by: NURSE PRACTITIONER

## 2022-12-05 PROCEDURE — 36415 COLL VENOUS BLD VENIPUNCTURE: CPT | Performed by: NURSE PRACTITIONER

## 2022-12-05 PROCEDURE — 99217 PR OBSERVATION CARE DISCHARGE: ICD-10-PCS | Mod: ,,, | Performed by: HOSPITALIST

## 2022-12-05 RX ORDER — POTASSIUM CHLORIDE 20 MEQ/1
40 TABLET, EXTENDED RELEASE ORAL ONCE
Status: COMPLETED | OUTPATIENT
Start: 2022-12-05 | End: 2022-12-05

## 2022-12-05 RX ADMIN — POTASSIUM CHLORIDE 40 MEQ: 1500 TABLET, EXTENDED RELEASE ORAL at 02:12

## 2022-12-05 RX ADMIN — ACETAMINOPHEN 1000 MG: 500 TABLET ORAL at 12:12

## 2022-12-05 NOTE — ASSESSMENT & PLAN NOTE
Previously on lisinopril but had angioedema and discontinued.  No meds at this time.      -Discussed heart healthy and low sodium diet.

## 2022-12-05 NOTE — NURSING
0730 Pt sitting up on side of bed. No distress noted. IV pump beeping and iv will not flush. No complaints or requests at this time. Call bell in reach. Side rails up x2.     1105 Pt sitting up in bed. No distress noted. No complaints or requests at this time. Call bell in reach. Side rails up x2.

## 2022-12-05 NOTE — ASSESSMENT & PLAN NOTE
Etiology is unclear.  She does smoke tobacco but she does not use marijuana which could contribute to cyclical vomiting.     12/5: Patient admitted to using THC vape pen. Discussed with patient this can cause N/V and advised on cessation.

## 2022-12-05 NOTE — DISCHARGE SUMMARY
[facility] Hospital Medicine  Discharge Summary      Patient Name: Martita Hilliard  MRN: [id number]  CHUCK: [id number]  Patient Class: OP- Observation  Admission Date: 12/3/2022  Hospital Length of Stay: 0 days  Discharge Date and Time:  12/05/2022 2:57 PM  Attending Physician: Michelle Price MD   Discharging Provider: PIOTR Mahmood  Primary Care Provider: Primary Doctor No    Primary Care Team: Networked reference to record PCT     HPI:   46 yo F presents to ED with four days of N/V.  Patient states that she was not having abdominal pain or SOB until several days after she began vomiting.  She has no GUEVARA or orthopnea.  Lungs are clear and abd soft and NT but she looks ill.  She came earlier today and was going to be admitted due to significant ketones in her urine and dehydration but she felt better after the zofran and 2L of IVF and wanted to try to go home but she has continued to vomit throughout the evening and has returned to the ED.      ROS as below.  I have reviewed Dr. Price's consult earlier and reviewed labs.        * No surgery found *      Hospital Course:   12/4: Patient now returns to the hospital with persistent nausea vomiting.  She presented emergency room yesterday and then decided to be discharged by the ER physician.    12/5: Patient denies any nausea or vomiting today. Was sitting up in bed eating breakfast tray during rounds. Admits to using THC vape pen. Discussed with patient that this can cause cyclical vomiting. She stated that she was using that to stimulate her appetite. Advised on risks associated with this and cessation advised. Application for care at [facility] was given to patient and reiterated importance of completing application as soon as possible to be seen there as a patient. Potassium 3.3 today, replaced with 40 mEq PO potassium prior to d/c. Discussed with patient heart healthy diet. ECHO still pending official read but  prelim EF 60%. BNP was mildly elevated to 400. Will need to follow up outpatient to follow any progression of this.     Patient counseled on the importance of keeping all hospital follow up appointments and compliance with medications, therapies, or devices as prescribed in order to provide for the best health outcomes and decrease the risk of hospital readmission. Additionally, patient given written literature regarding their current disease processes and home care recommendations. Patient given opportunity to ask questions and verbalized understanding of all information discussed.          Goals of Care Treatment Preferences:  Code Status: Full Code      Consults:     * Dehydration with hyponatremia  Intractable nausea and vomiting with urine ketones.    Bowel rest, IV antiemetics and IVF.  Patient with ketouria   Will check for serum acetone and starvation ketosis.    If develops diarrhea consider stool culture and consider salmonella.  If after 24 h still unable to tolerate PO, would consider eval for upper endoscopy to r/o gastric outlet obstruction.    Recheck labs in am.  Leukocytosis could be stress margination or from infectious gastroenteritis.    Replace electrolytes as indicated.    Patient's SOB has resolved but will check cxr for aspiration and KUB to eval for ileus.    CT abdomen and pelvis done earlier reviewed and unremarkable.     12/5:  CXR and KUB normal  WBC WNL   Tolerating regular diet with no complaints of N/V    Tobacco use  Patient counseled against tobacco use and nicotine patch ordered.    Will discuss with patient was use marijuana as marijuana may contribute to cyclical vomiting.        Shortness of breath  Patient endorses shortness of breath and lower extremity edema.  She also reported palpitations.    Etiology unclear but there is a history of tobacco use and BNP is elevated.  Echocardiogram was ordered.  Given patient's age and constellation of signs query if she had rheumatologic  disease.  She does not currently have insurance for further workup for possible rheumatologic disease should be a high priority.      Intractable nausea and vomiting  Etiology is unclear.  She does smoke tobacco but she does not use marijuana which could contribute to cyclical vomiting.     12/5: Patient admitted to using THC vape pen. Discussed with patient this can cause N/V and advised on cessation.       Hypertension  Previously on lisinopril but had angioedema and discontinued.  No meds at this time.      -Discussed heart healthy and low sodium diet.       Final Active Diagnoses:    Diagnosis Date Noted POA    PRINCIPAL PROBLEM:  Dehydration with hyponatremia [E86.0, E87.1]  Yes    Intractable nausea and vomiting [R11.2] 12/04/2022 Yes    Shortness of breath [R06.02] 12/04/2022 Yes    Tobacco use [Z72.0] 12/04/2022 Yes    Hypertension [I10]  Yes      Problems Resolved During this Admission:       Discharged Condition: stable    Disposition: Home or Self Care    Follow Up:   Follow-up Information     [facility] Follow up.    Why: Turn in paperwork as soon as possible to schedule appt.  Contact information:  [address] [facility] [place] [id number] [phone]                       Patient Instructions:      Diet Cardiac     Notify your health care provider if you experience any of the following:  difficulty breathing or increased cough     Notify your health care provider if you experience any of the following:  persistent dizziness, light-headedness, or visual disturbances     Notify your health care provider if you experience any of the following:  persistent nausea and vomiting or diarrhea     Notify your health care provider if you experience any of the following:  severe uncontrolled pain     Notify your health care provider if you experience any of the following:  temperature >100.4     Notify your health care provider if you experience any of the following:  increased confusion or  weakness     Activity as tolerated       Significant Diagnostic Studies: Labs:   BMP:   Recent Labs   Lab 12/04/22  0250 12/05/22  0901   GLU 85 93    139   K 3.3* 3.3*    103   CO2 22 26   BUN 5* 5*   CREATININE 0.68 0.76   CALCIUM 8.2* 8.7   MG 2.0  --    , CBC   Recent Labs   Lab 12/04/22  0251 12/05/22  0901   WBC 13.41* 8.34   HGB 9.6* 9.9*   HCT 31.7* 34.2*    343    and All labs within the past 24 hours have been reviewed    Pending Diagnostic Studies:     Procedure Component Value Units Date/Time    Echo [[id number]] Resulted: 12/05/22 1125    Order Status: Sent Lab Status: In process Updated: 12/05/22 1134     BSA 1.89 m2      Right Atrial Pressure (from IVC) 3 mmHg      EF 60 %      Left Ventricular Outflow Tract Mean Gradient 2.00 mmHg      AORTIC VALVE CUSP SEPERATION 1.81 cm      LVIDd 4.55 cm      IVS 0.81 cm      Posterior Wall 0.88 cm      Ao root annulus 2.10 cm      LVIDs 2.81 cm      FS 38 %      IVC ostium 1.2 cm      LV mass 124.38 g      LA size 3.00 cm      RVDD 3.10 cm      TAPSE 2.40 cm      Left Ventricle Relative Wall Thickness 0.39 cm      AV mean gradient 3 mmHg      AV valve area 2.04 cm2      AV Velocity Ratio 0.90     AV index (prosthetic) 0.72     E wave deceleration time 163.00 msec      LVOT diameter 1.90 cm      LVOT area 2.8 cm2      LVOT peak albert 0.9 m/s      LVOT peak VTI 18.00 cm      Ao peak albert 1.0 m/s      Ao VTI 25.00 cm      LVOT stroke volume 51.01 cm3      AV peak gradient 4 mmHg      TV rest pulmonary artery pressure 28 mmHg      MV Peak E Albert 1.03 m/s      TR Max Albert 2.50 m/s      LV Systolic Volume 29.80 mL      LV Systolic Volume Index 16.2 mL/m2      LV Diastolic Volume 94.90 mL      LV Diastolic Volume Index 51.58 mL/m2      LV Mass Index 68 g/m2      Echo EF Estimated 60 %      RA Major Axis 2.40 cm      Triscuspid Valve Regurgitation Peak Gradient 25 mmHg     Narrative:      · The left ventricle is normal in size with normal systolic  function.  · The estimated ejection fraction is 60%.  · Normal left ventricular diastolic function.  · Atrial fibrillation not observed.  · Normal right ventricular size.  · Mild mitral regurgitation.  · Mild tricuspid regurgitation.  · Normal central venous pressure (3 mmHg).  · The estimated PA systolic pressure is 28 mmHg.  · Trivial pericardial effusion.       Impression:               Medications:  Reconciled Home Medications:      Medication List      CONTINUE taking these medications    ondansetron 4 MG Tbdl  Commonly known as: ZOFRAN-ODT  Take 1 tablet (4 mg total) by mouth 2 (two) times daily.     VITAMIN C 1000 MG tablet  Generic drug: ascorbic acid (vitamin C)  Take 1,000 mg by mouth once daily.     VITAMIN D2 50,000 unit Cap  Generic drug: ergocalciferol  Take 50,000 Units by mouth every 7 days.        STOP taking these medications    famotidine 20 MG tablet  Commonly known as: PEPCID     hydrOXYzine HCL 25 MG tablet  Commonly known as: ATARAX            Indwelling Lines/Drains at time of discharge:   Lines/Drains/Airways     None             The patient has been seen and examined by both myself and Dr Michelle Price MD on the date of discharge and determined to be suitable/stable for discharge.    Time spent on the discharge of patient: Greater than 30 minutes         PIOTR Mahmood  Department of Hospital Medicine  [facility]

## 2022-12-05 NOTE — ASSESSMENT & PLAN NOTE
Intractable nausea and vomiting with urine ketones.    Bowel rest, IV antiemetics and IVF.  Patient with ketouria   Will check for serum acetone and starvation ketosis.    If develops diarrhea consider stool culture and consider salmonella.  If after 24 h still unable to tolerate PO, would consider eval for upper endoscopy to r/o gastric outlet obstruction.    Recheck labs in am.  Leukocytosis could be stress margination or from infectious gastroenteritis.    Replace electrolytes as indicated.    Patient's SOB has resolved but will check cxr for aspiration and KUB to eval for ileus.    CT abdomen and pelvis done earlier reviewed and unremarkable.     12/5:  CXR and KUB normal  WBC WNL   Tolerating regular diet with no complaints of N/V

## 2023-04-25 ENCOUNTER — HOSPITAL ENCOUNTER (EMERGENCY)
Facility: HOSPITAL | Age: 48
Discharge: HOME | End: 2023-04-25

## 2023-04-25 VITALS
BODY MASS INDEX: 26.75 KG/M2 | TEMPERATURE: 99 F | HEART RATE: 118 BPM | HEIGHT: 63 IN | RESPIRATION RATE: 16 BRPM | OXYGEN SATURATION: 100 % | WEIGHT: 151 LBS | DIASTOLIC BLOOD PRESSURE: 80 MMHG | SYSTOLIC BLOOD PRESSURE: 128 MMHG

## 2023-04-25 DIAGNOSIS — J01.90 ACUTE SINUSITIS WITH SYMPTOMS > 10 DAYS: ICD-10-CM

## 2023-04-25 DIAGNOSIS — H65.02 NON-RECURRENT ACUTE SEROUS OTITIS MEDIA OF LEFT EAR: ICD-10-CM

## 2023-04-25 DIAGNOSIS — A08.4 VIRAL GASTROENTERITIS: Primary | ICD-10-CM

## 2023-04-25 LAB
ALBUMIN SERPL BCP-MCNC: 4.1 G/DL (ref 3.5–5)
ALBUMIN/GLOB SERPL: 0.9 {RATIO}
ALP SERPL-CCNC: 90 U/L (ref 39–100)
ALT SERPL W P-5'-P-CCNC: 16 U/L (ref 13–56)
AMYLASE SERPL-CCNC: 95 U/L (ref 25–115)
ANION GAP SERPL CALCULATED.3IONS-SCNC: 20 MMOL/L (ref 7–16)
AST SERPL W P-5'-P-CCNC: 24 U/L (ref 15–37)
B-HCG UR QL: NEGATIVE
BACTERIA #/AREA URNS HPF: ABNORMAL /HPF
BASOPHILS # BLD AUTO: 0.06 K/UL (ref 0–0.2)
BASOPHILS NFR BLD AUTO: 0.5 % (ref 0–1)
BILIRUB SERPL-MCNC: 0.7 MG/DL (ref ?–1.2)
BILIRUB UR QL STRIP: NEGATIVE
BUN SERPL-MCNC: 11 MG/DL (ref 7–18)
BUN/CREAT SERPL: 10 (ref 6–20)
CALCIUM SERPL-MCNC: 9.7 MG/DL (ref 8.5–10.1)
CHLORIDE SERPL-SCNC: 95 MMOL/L (ref 98–107)
CLARITY UR: ABNORMAL
CO2 SERPL-SCNC: 21 MMOL/L (ref 21–32)
COLOR UR: YELLOW
CREAT SERPL-MCNC: 1.1 MG/DL (ref 0.55–1.02)
CTP QC/QA: YES
DIFFERENTIAL METHOD BLD: ABNORMAL
EGFR (NO RACE VARIABLE) (RUSH/TITUS): 62 ML/MIN/1.73M²
EOSINOPHIL # BLD AUTO: 0.06 K/UL (ref 0–0.5)
EOSINOPHIL NFR BLD AUTO: 0.5 % (ref 1–4)
ERYTHROCYTE [DISTWIDTH] IN BLOOD BY AUTOMATED COUNT: 16.6 % (ref 11.5–14.5)
FINE GRAN CASTS #/AREA URNS LPF: ABNORMAL /LPF
FLUAV AG UPPER RESP QL IA.RAPID: NEGATIVE
FLUBV AG UPPER RESP QL IA.RAPID: NEGATIVE
GLOBULIN SER-MCNC: 4.7 G/DL (ref 2–4)
GLUCOSE SERPL-MCNC: 101 MG/DL (ref 74–106)
GLUCOSE UR STRIP-MCNC: NORMAL MG/DL
HCT VFR BLD AUTO: 34.1 % (ref 38–47)
HGB BLD-MCNC: 10.5 G/DL (ref 12–16)
HYALINE CASTS #/AREA URNS LPF: ABNORMAL /LPF
IMM GRANULOCYTES # BLD AUTO: 0.06 K/UL (ref 0–0.04)
IMM GRANULOCYTES NFR BLD: 0.5 % (ref 0–0.4)
KETONES UR STRIP-SCNC: ABNORMAL MG/DL
LEUKOCYTE ESTERASE UR QL STRIP: NEGATIVE
LIPASE SERPL-CCNC: 95 U/L (ref 73–393)
LYMPHOCYTES # BLD AUTO: 3.28 K/UL (ref 1–4.8)
LYMPHOCYTES NFR BLD AUTO: 24.8 % (ref 27–41)
MAGNESIUM SERPL-MCNC: 2 MG/DL (ref 1.7–2.3)
MCH RBC QN AUTO: 23.8 PG (ref 27–31)
MCHC RBC AUTO-ENTMCNC: 30.8 G/DL (ref 32–36)
MCV RBC AUTO: 77.1 FL (ref 80–96)
MONOCYTES # BLD AUTO: 1.07 K/UL (ref 0–0.8)
MONOCYTES NFR BLD AUTO: 8.1 % (ref 2–6)
MPC BLD CALC-MCNC: 9.1 FL (ref 9.4–12.4)
MUCOUS THREADS #/AREA URNS HPF: ABNORMAL /HPF
NEUTROPHILS # BLD AUTO: 8.71 K/UL (ref 1.8–7.7)
NEUTROPHILS NFR BLD AUTO: 65.6 % (ref 53–65)
NITRITE UR QL STRIP: NEGATIVE
NRBC # BLD AUTO: 0 X10E3/UL
NRBC, AUTO (.00): 0 %
PH UR STRIP: 6 PH UNITS
PLATELET # BLD AUTO: 344 K/UL (ref 150–400)
POTASSIUM SERPL-SCNC: 2.6 MMOL/L (ref 3.5–5.1)
PROT SERPL-MCNC: 8.8 G/DL (ref 6.4–8.2)
PROT UR QL STRIP: 50
RBC # BLD AUTO: 4.42 M/UL (ref 4.2–5.4)
RBC # UR STRIP: ABNORMAL /UL
RBC #/AREA URNS HPF: ABNORMAL /HPF
SARS-COV+SARS-COV-2 AG RESP QL IA.RAPID: NEGATIVE
SODIUM SERPL-SCNC: 133 MMOL/L (ref 136–145)
SP GR UR STRIP: 1.01
SQUAMOUS #/AREA URNS LPF: ABNORMAL /LPF
UROBILINOGEN UR STRIP-ACNC: NORMAL MG/DL
WBC # BLD AUTO: 13.24 K/UL (ref 4.5–11)
WBC #/AREA URNS HPF: ABNORMAL /HPF
YEAST #/AREA URNS HPF: ABNORMAL /HPF

## 2023-04-25 PROCEDURE — 81001 URINALYSIS AUTO W/SCOPE: CPT | Performed by: NURSE PRACTITIONER

## 2023-04-25 PROCEDURE — 80053 COMPREHEN METABOLIC PANEL: CPT | Performed by: NURSE PRACTITIONER

## 2023-04-25 PROCEDURE — 25000003 PHARM REV CODE 250: Performed by: NURSE PRACTITIONER

## 2023-04-25 PROCEDURE — 99284 EMERGENCY DEPT VISIT MOD MDM: CPT | Mod: ,,, | Performed by: NURSE PRACTITIONER

## 2023-04-25 PROCEDURE — 83690 ASSAY OF LIPASE: CPT | Performed by: NURSE PRACTITIONER

## 2023-04-25 PROCEDURE — 81025 URINE PREGNANCY TEST: CPT | Performed by: NURSE PRACTITIONER

## 2023-04-25 PROCEDURE — 96366 THER/PROPH/DIAG IV INF ADDON: CPT

## 2023-04-25 PROCEDURE — 85025 COMPLETE CBC W/AUTO DIFF WBC: CPT | Performed by: NURSE PRACTITIONER

## 2023-04-25 PROCEDURE — 96365 THER/PROPH/DIAG IV INF INIT: CPT

## 2023-04-25 PROCEDURE — 96367 TX/PROPH/DG ADDL SEQ IV INF: CPT

## 2023-04-25 PROCEDURE — 87428 SARSCOV & INF VIR A&B AG IA: CPT | Performed by: NURSE PRACTITIONER

## 2023-04-25 PROCEDURE — 83735 ASSAY OF MAGNESIUM: CPT | Performed by: NURSE PRACTITIONER

## 2023-04-25 PROCEDURE — 63600175 PHARM REV CODE 636 W HCPCS: Performed by: NURSE PRACTITIONER

## 2023-04-25 PROCEDURE — 82150 ASSAY OF AMYLASE: CPT | Performed by: NURSE PRACTITIONER

## 2023-04-25 PROCEDURE — 96375 TX/PRO/DX INJ NEW DRUG ADDON: CPT

## 2023-04-25 PROCEDURE — 99284 EMERGENCY DEPT VISIT MOD MDM: CPT | Mod: 25

## 2023-04-25 PROCEDURE — 99284 PR EMERGENCY DEPT VISIT,LEVEL IV: ICD-10-PCS | Mod: ,,, | Performed by: NURSE PRACTITIONER

## 2023-04-25 RX ORDER — DEXAMETHASONE SODIUM PHOSPHATE 4 MG/ML
4 INJECTION, SOLUTION INTRA-ARTICULAR; INTRALESIONAL; INTRAMUSCULAR; INTRAVENOUS; SOFT TISSUE
Status: COMPLETED | OUTPATIENT
Start: 2023-04-25 | End: 2023-04-25

## 2023-04-25 RX ORDER — METHYLPREDNISOLONE 4 MG/1
TABLET ORAL
Qty: 1 EACH | Refills: 0 | Status: SHIPPED | OUTPATIENT
Start: 2023-04-25 | End: 2023-05-16

## 2023-04-25 RX ORDER — SULFAMETHOXAZOLE AND TRIMETHOPRIM 800; 160 MG/1; MG/1
1 TABLET ORAL
Status: COMPLETED | OUTPATIENT
Start: 2023-04-25 | End: 2023-04-25

## 2023-04-25 RX ORDER — POTASSIUM CHLORIDE 20 MEQ/1
40 TABLET, EXTENDED RELEASE ORAL ONCE
Status: COMPLETED | OUTPATIENT
Start: 2023-04-25 | End: 2023-04-25

## 2023-04-25 RX ORDER — ONDANSETRON 4 MG/1
4 TABLET, ORALLY DISINTEGRATING ORAL EVERY 6 HOURS PRN
Qty: 20 TABLET | Refills: 0 | Status: ON HOLD | OUTPATIENT
Start: 2023-04-25 | End: 2023-06-28

## 2023-04-25 RX ORDER — IPRATROPIUM BROMIDE 42 UG/1
2 SPRAY, METERED NASAL 2 TIMES DAILY
Qty: 15 ML | Refills: 0 | Status: ON HOLD | OUTPATIENT
Start: 2023-04-25 | End: 2023-06-28

## 2023-04-25 RX ORDER — SULFAMETHOXAZOLE AND TRIMETHOPRIM 800; 160 MG/1; MG/1
1 TABLET ORAL 2 TIMES DAILY
Qty: 14 TABLET | Refills: 0 | Status: SHIPPED | OUTPATIENT
Start: 2023-04-25 | End: 2023-05-02

## 2023-04-25 RX ORDER — SODIUM CHLORIDE 9 MG/ML
1000 INJECTION, SOLUTION INTRAVENOUS
Status: COMPLETED | OUTPATIENT
Start: 2023-04-25 | End: 2023-04-25

## 2023-04-25 RX ORDER — POTASSIUM CHLORIDE 7.45 MG/ML
10 INJECTION INTRAVENOUS
Status: COMPLETED | OUTPATIENT
Start: 2023-04-25 | End: 2023-04-25

## 2023-04-25 RX ADMIN — POTASSIUM CHLORIDE 40 MEQ: 1500 TABLET, EXTENDED RELEASE ORAL at 04:04

## 2023-04-25 RX ADMIN — POTASSIUM CHLORIDE 10 MEQ: 7.46 INJECTION, SOLUTION INTRAVENOUS at 04:04

## 2023-04-25 RX ADMIN — PROMETHAZINE HYDROCHLORIDE 25 MG: 25 INJECTION INTRAMUSCULAR; INTRAVENOUS at 02:04

## 2023-04-25 RX ADMIN — SULFAMETHOXAZOLE AND TRIMETHOPRIM 1 TABLET: 800; 160 TABLET ORAL at 06:04

## 2023-04-25 RX ADMIN — SODIUM CHLORIDE 1000 ML: 9 INJECTION, SOLUTION INTRAVENOUS at 04:04

## 2023-04-25 RX ADMIN — SODIUM CHLORIDE 1000 ML: 9 INJECTION, SOLUTION INTRAVENOUS at 02:04

## 2023-04-25 RX ADMIN — DEXAMETHASONE SODIUM PHOSPHATE 4 MG: 4 INJECTION, SOLUTION INTRAMUSCULAR; INTRAVENOUS at 06:04

## 2023-04-25 NOTE — Clinical Note
"Martita Hesstony Hilliard was seen and treated in our emergency department on 4/25/2023.  She may return to work on 04/28/2023.       If you have any questions or concerns, please don't hesitate to call.      Pam Shah, PIOTR"

## 2023-04-26 NOTE — DISCHARGE INSTRUCTIONS
Take medications as prescribed.   Follow up with PCP in 2 days for recheck if symptoms do  not improve with treatment.   Return to ER with new or worsening symptoms.

## 2023-05-02 ENCOUNTER — OFFICE VISIT (OUTPATIENT)
Dept: OBSTETRICS AND GYNECOLOGY | Facility: CLINIC | Age: 48
End: 2023-05-02

## 2023-05-02 ENCOUNTER — PROCEDURE VISIT (OUTPATIENT)
Dept: OBSTETRICS AND GYNECOLOGY | Facility: CLINIC | Age: 48
End: 2023-05-02

## 2023-05-02 VITALS
SYSTOLIC BLOOD PRESSURE: 116 MMHG | RESPIRATION RATE: 18 BRPM | HEART RATE: 87 BPM | HEIGHT: 63 IN | DIASTOLIC BLOOD PRESSURE: 75 MMHG | OXYGEN SATURATION: 99 % | WEIGHT: 167 LBS | TEMPERATURE: 98 F | BODY MASS INDEX: 29.59 KG/M2

## 2023-05-02 DIAGNOSIS — N92.0 MENORRHAGIA WITH REGULAR CYCLE: ICD-10-CM

## 2023-05-02 DIAGNOSIS — N83.201 BILATERAL OVARIAN CYSTS: ICD-10-CM

## 2023-05-02 DIAGNOSIS — Z12.4 CERVICAL CANCER SCREENING: ICD-10-CM

## 2023-05-02 DIAGNOSIS — Z12.11 COLON CANCER SCREENING: ICD-10-CM

## 2023-05-02 DIAGNOSIS — N85.00 ENDOMETRIAL HYPERPLASIA: ICD-10-CM

## 2023-05-02 DIAGNOSIS — Z12.39 ENCOUNTER FOR SCREENING FOR MALIGNANT NEOPLASM OF BREAST, UNSPECIFIED SCREENING MODALITY: ICD-10-CM

## 2023-05-02 DIAGNOSIS — Z12.11 SCREENING FOR COLON CANCER: Primary | ICD-10-CM

## 2023-05-02 DIAGNOSIS — R10.2 PELVIC PRESSURE IN FEMALE: Primary | ICD-10-CM

## 2023-05-02 DIAGNOSIS — N83.202 BILATERAL OVARIAN CYSTS: ICD-10-CM

## 2023-05-02 DIAGNOSIS — D64.9 ANEMIA, UNSPECIFIED TYPE: ICD-10-CM

## 2023-05-02 LAB
CANDIDA SPECIES: NEGATIVE
GARDNERELLA: NEGATIVE
TRICHOMONAS: NEGATIVE

## 2023-05-02 PROCEDURE — 99204 PR OFFICE/OUTPT VISIT, NEW, LEVL IV, 45-59 MIN: ICD-10-PCS | Mod: ,,, | Performed by: ADVANCED PRACTICE MIDWIFE

## 2023-05-02 PROCEDURE — 87660 BACTERIAL VAGINOSIS: ICD-10-PCS | Mod: ,,, | Performed by: CLINICAL MEDICAL LABORATORY

## 2023-05-02 PROCEDURE — 87491 CHLAMYDIA/GONORRHOEAE(GC), PCR: ICD-10-PCS | Mod: ,,, | Performed by: CLINICAL MEDICAL LABORATORY

## 2023-05-02 PROCEDURE — 76856 US EXAM PELVIC COMPLETE: CPT | Mod: ,,, | Performed by: OBSTETRICS & GYNECOLOGY

## 2023-05-02 PROCEDURE — 87510 GARDNER VAG DNA DIR PROBE: CPT | Mod: ,,, | Performed by: CLINICAL MEDICAL LABORATORY

## 2023-05-02 PROCEDURE — 87480 BACTERIAL VAGINOSIS: ICD-10-PCS | Mod: ,,, | Performed by: CLINICAL MEDICAL LABORATORY

## 2023-05-02 PROCEDURE — 88142 CYTOPATH C/V THIN LAYER: CPT | Mod: TC,GCY | Performed by: ADVANCED PRACTICE MIDWIFE

## 2023-05-02 PROCEDURE — 87591 N.GONORRHOEAE DNA AMP PROB: CPT | Mod: ,,, | Performed by: CLINICAL MEDICAL LABORATORY

## 2023-05-02 PROCEDURE — 99499 UNLISTED E&M SERVICE: CPT | Mod: ,,, | Performed by: OBSTETRICS & GYNECOLOGY

## 2023-05-02 PROCEDURE — 76856 PR  ECHO,PELVIC (NONOBSTETRIC): ICD-10-PCS | Mod: ,,, | Performed by: OBSTETRICS & GYNECOLOGY

## 2023-05-02 PROCEDURE — 87660 TRICHOMONAS VAGIN DIR PROBE: CPT | Mod: ,,, | Performed by: CLINICAL MEDICAL LABORATORY

## 2023-05-02 PROCEDURE — 87510 BACTERIAL VAGINOSIS: ICD-10-PCS | Mod: ,,, | Performed by: CLINICAL MEDICAL LABORATORY

## 2023-05-02 PROCEDURE — 87480 CANDIDA DNA DIR PROBE: CPT | Mod: ,,, | Performed by: CLINICAL MEDICAL LABORATORY

## 2023-05-02 PROCEDURE — 99499 NO LOS: ICD-10-PCS | Mod: ,,, | Performed by: OBSTETRICS & GYNECOLOGY

## 2023-05-02 PROCEDURE — 87491 CHLMYD TRACH DNA AMP PROBE: CPT | Mod: ,,, | Performed by: CLINICAL MEDICAL LABORATORY

## 2023-05-02 PROCEDURE — 99204 OFFICE O/P NEW MOD 45 MIN: CPT | Mod: ,,, | Performed by: ADVANCED PRACTICE MIDWIFE

## 2023-05-02 PROCEDURE — 87591 CHLAMYDIA/GONORRHOEAE(GC), PCR: ICD-10-PCS | Mod: ,,, | Performed by: CLINICAL MEDICAL LABORATORY

## 2023-05-02 RX ORDER — FERROUS SULFATE 325(65) MG
325 TABLET, DELAYED RELEASE (ENTERIC COATED) ORAL 2 TIMES DAILY
Qty: 60 TABLET | Refills: 4 | Status: ON HOLD | OUTPATIENT
Start: 2023-05-02 | End: 2023-06-28

## 2023-05-02 NOTE — PROGRESS NOTES
"Subjective     Patient ID: Martita Hilliard is a 47 y.o. female.    Chief Complaint: No chief complaint on file.    Here with c/o of pelvic pressure with menstrual cycles "over 20 something years, every since I had my children". Menses are painful "starts in my back, I feel a pull in my vagina and it feels like a vacuum sucking out of my vagina". States the second or third day of menstrual cycles are "my worst days". States she has trouble sitting when menstrual cycles are on due to the excessive pain, weight of pelvic area, and vaginal pressure.     States, menstrual cycles are monthly, lasts 5-7 days, heavy and requires wearing a "towel" to hold the "chunky clots" with last menstrual cycle.  has not been sexually active in approximately 20 years. States during menses, she experiences a lot of pelvic discomfort, sometimes hot flashes. Pt admits to having a history of genital herpes.    States last pap smear was 8 years ago and was performed at [facility].  States last mammogram was 2 years ago.   has never had a colonoscopy before.    Review of Systems   All other systems reviewed and are negative.       Objective     Physical Exam  Vitals reviewed.   Constitutional:       Appearance: Normal appearance.   Pulmonary:      Effort: Pulmonary effort is normal.   Abdominal:      General: Abdomen is flat.      Palpations: Abdomen is soft.   Genitourinary:     Exam position: Lithotomy position.      Vagina: Vaginal discharge present.      Cervix: Normal.      Uterus: Tender.       Adnexa: Right adnexa normal and left adnexa normal.      Comments: Thin white discharge  Skin:     General: Skin is warm and dry.      Capillary Refill: Capillary refill takes less than 2 seconds.   Neurological:      General: No focal deficit present.      Mental Status: She is alert and oriented to person, place, and time.   Psychiatric:         Mood and Affect: Mood normal.         Behavior: Behavior normal.         Thought " Content: Thought content normal.         Judgment: Judgment normal.     US today- endometrial thickness noted @ 1.17 cm     Assessment and Plan     Problem List Items Addressed This Visit    None  Visit Diagnoses       Pelvic pressure in female    -  Primary    Relevant Orders    CBC Auto Differential    US OB/GYN In Clinic Procedure (Non Viewpoint)-Future    TSH    T4, Free    Chlamydia/GC, PCR    Cervical cancer screening        Relevant Orders    ThinPrep Pap Test    Colon cancer screening        Relevant Orders    Ambulatory referral/consult to Gastroenterology    Encounter for screening for malignant neoplasm of breast, unspecified screening modality        Relevant Orders    Mammo Digital Screening Bilat    Menorrhagia with regular cycle        Relevant Orders    CBC Auto Differential    Anemia, unspecified type        Relevant Medications    ferrous sulfate 325 (65 FE) MG EC tablet    Endometrial hyperplasia        Bilateral ovarian cysts              F/u in 1 week for endometrial biopsy  Labs and Ultrasound obtained today  Discussed IUD for heavy menses post EMB results if desired  Verbalized understanding to all instructions and information  Questions answered to desired level of satisfaction

## 2023-05-03 LAB
CHLAMYDIA BY PCR: NEGATIVE
N. GONORRHOEAE (GC) BY PCR: NEGATIVE

## 2023-05-04 LAB
GH SERPL-MCNC: NORMAL NG/ML
INSULIN SERPL-ACNC: NORMAL U[IU]/ML
LAB AP CLINICAL INFORMATION: NORMAL
LAB AP GYN INTERPRETATION: NEGATIVE
LAB AP PAP DISCLAIMER COMMENTS: NORMAL
RENIN PLAS-CCNC: NORMAL NG/ML/H

## 2023-05-09 ENCOUNTER — OFFICE VISIT (OUTPATIENT)
Dept: OBSTETRICS AND GYNECOLOGY | Facility: CLINIC | Age: 48
End: 2023-05-09

## 2023-05-09 VITALS
RESPIRATION RATE: 18 BRPM | OXYGEN SATURATION: 99 % | HEART RATE: 82 BPM | TEMPERATURE: 98 F | BODY MASS INDEX: 29.02 KG/M2 | WEIGHT: 163.81 LBS | SYSTOLIC BLOOD PRESSURE: 136 MMHG | HEIGHT: 63 IN | DIASTOLIC BLOOD PRESSURE: 82 MMHG

## 2023-05-09 DIAGNOSIS — R10.2 PELVIC PRESSURE IN FEMALE: ICD-10-CM

## 2023-05-09 DIAGNOSIS — N92.0 MENORRHAGIA WITH REGULAR CYCLE: ICD-10-CM

## 2023-05-09 DIAGNOSIS — N85.00 ENDOMETRIAL HYPERPLASIA: Primary | ICD-10-CM

## 2023-05-09 LAB
BASOPHILS # BLD AUTO: 0.07 K/UL (ref 0–0.2)
BASOPHILS NFR BLD AUTO: 0.6 % (ref 0–1)
DIFFERENTIAL METHOD BLD: ABNORMAL
EOSINOPHIL # BLD AUTO: 0.06 K/UL (ref 0–0.5)
EOSINOPHIL NFR BLD AUTO: 0.5 % (ref 1–4)
ERYTHROCYTE [DISTWIDTH] IN BLOOD BY AUTOMATED COUNT: 18.6 % (ref 11.5–14.5)
HCT VFR BLD AUTO: 33.6 % (ref 38–47)
HGB BLD-MCNC: 10 G/DL (ref 12–16)
IMM GRANULOCYTES # BLD AUTO: 0.04 K/UL (ref 0–0.04)
IMM GRANULOCYTES NFR BLD: 0.3 % (ref 0–0.4)
LYMPHOCYTES # BLD AUTO: 2.56 K/UL (ref 1–4.8)
LYMPHOCYTES NFR BLD AUTO: 20.4 % (ref 27–41)
MCH RBC QN AUTO: 23.6 PG (ref 27–31)
MCHC RBC AUTO-ENTMCNC: 29.8 G/DL (ref 32–36)
MCV RBC AUTO: 79.4 FL (ref 80–96)
MONOCYTES # BLD AUTO: 0.63 K/UL (ref 0–0.8)
MONOCYTES NFR BLD AUTO: 5 % (ref 2–6)
MPC BLD CALC-MCNC: 9.9 FL (ref 9.4–12.4)
NEUTROPHILS # BLD AUTO: 9.18 K/UL (ref 1.8–7.7)
NEUTROPHILS NFR BLD AUTO: 73.2 % (ref 53–65)
NRBC # BLD AUTO: 0 X10E3/UL
NRBC, AUTO (.00): 0 %
PLATELET # BLD AUTO: 422 K/UL (ref 150–400)
RBC # BLD AUTO: 4.23 M/UL (ref 4.2–5.4)
T4 FREE SERPL-MCNC: 0.86 NG/DL (ref 0.76–1.46)
TSH SERPL DL<=0.005 MIU/L-ACNC: 2.86 UIU/ML (ref 0.36–3.74)
WBC # BLD AUTO: 12.54 K/UL (ref 4.5–11)

## 2023-05-09 PROCEDURE — 36415 COLL VENOUS BLD VENIPUNCTURE: CPT | Mod: ,,, | Performed by: ADVANCED PRACTICE MIDWIFE

## 2023-05-09 PROCEDURE — 84443 ASSAY THYROID STIM HORMONE: CPT | Mod: ,,, | Performed by: CLINICAL MEDICAL LABORATORY

## 2023-05-09 PROCEDURE — 84439 T4, FREE: ICD-10-PCS | Mod: ,,, | Performed by: CLINICAL MEDICAL LABORATORY

## 2023-05-09 PROCEDURE — 99499 UNLISTED E&M SERVICE: CPT | Mod: ,,, | Performed by: ADVANCED PRACTICE MIDWIFE

## 2023-05-09 PROCEDURE — 88305 TISSUE EXAM BY PATHOLOGIST: CPT | Mod: TC,SUR | Performed by: ADVANCED PRACTICE MIDWIFE

## 2023-05-09 PROCEDURE — 36415 PR COLLECTION VENOUS BLOOD,VENIPUNCTURE: ICD-10-PCS | Mod: ,,, | Performed by: ADVANCED PRACTICE MIDWIFE

## 2023-05-09 PROCEDURE — 88305 TISSUE EXAM BY PATHOLOGIST: CPT | Mod: 26,,, | Performed by: PATHOLOGY

## 2023-05-09 PROCEDURE — 88305 SURGICAL PATHOLOGY: ICD-10-PCS | Mod: 26,,, | Performed by: PATHOLOGY

## 2023-05-09 PROCEDURE — 84443 TSH: ICD-10-PCS | Mod: ,,, | Performed by: CLINICAL MEDICAL LABORATORY

## 2023-05-09 PROCEDURE — 84439 ASSAY OF FREE THYROXINE: CPT | Mod: ,,, | Performed by: CLINICAL MEDICAL LABORATORY

## 2023-05-09 PROCEDURE — 99499 NO LOS: ICD-10-PCS | Mod: ,,, | Performed by: ADVANCED PRACTICE MIDWIFE

## 2023-05-09 PROCEDURE — 85025 COMPLETE CBC W/AUTO DIFF WBC: CPT | Mod: ,,, | Performed by: CLINICAL MEDICAL LABORATORY

## 2023-05-09 PROCEDURE — 85025 CBC WITH DIFFERENTIAL: ICD-10-PCS | Mod: ,,, | Performed by: CLINICAL MEDICAL LABORATORY

## 2023-05-09 NOTE — PROCEDURES
Procedures  CC: ENDOMETRIAL BIOPSPY    Martita Hilliard is a 47 y.o. female  presents for an endometrial biopsy secondary to endometrial hyperplasia and menorrhagia.   UPT is not done secondary to h/o tubal ligation.    PRE ENDOMETRIAL BIOPSY COUNSELING:  The patient was informed of the risk of bleeding, infection, uterine perforation and pain and that the test will rule-out endometrial cancer with accuracy greater than 95%. She was counseled on the alternatives to endometrial biopsy and agrees to proceed.    TIME OUT PERFORMED.  The cervix was visualized with a speculum.  A single tooth tenaculum was not placed on the anterior lip prior to the biopsy.  A sterile endometrial pipelle was passed without difficulty to a depth of 10 cm.  Scant endometrial tissue was obtained.    The specimen was placed in formalyn and sent to Pathology for histology evaluation. The patient tolerated the procedure well    ASSESSMENT: Thickened endometrium    POST ENDOMETRIAL BIOPSY COUNSELING:  Manage post biopsy cramping with NSAIDs or Tyleno.  Expect spotting or light bleeding for a few days.  Report bleeding heavier than a period, fever > 101.0 F, worsening pain or a foul smelling vaginal discharge.    Counseling lasted approximately 15 minutes and all her questions were answered.    FOLLOW-UP: Pending biopsy results with Dr. Campbell for management of heavy menses, endometrial thickness secondary to refusal of IUD and other BCM with pending insurance per hospital expiring soon/at the end of .     Dinora Naidu, BRENDA, CNM, WHNP-BC

## 2023-05-10 LAB
ESTROGEN SERPL-MCNC: NORMAL PG/ML
INSULIN SERPL-ACNC: NORMAL U[IU]/ML
LAB AP CLINICAL INFORMATION: NORMAL
LAB AP GROSS DESCRIPTION: NORMAL
LAB AP LABORATORY NOTES: NORMAL
T3RU NFR SERPL: NORMAL %

## 2023-05-19 ENCOUNTER — OFFICE VISIT (OUTPATIENT)
Dept: FAMILY MEDICINE | Facility: CLINIC | Age: 48
End: 2023-05-19

## 2023-05-19 VITALS
RESPIRATION RATE: 18 BRPM | HEART RATE: 95 BPM | HEIGHT: 63 IN | TEMPERATURE: 99 F | WEIGHT: 164 LBS | DIASTOLIC BLOOD PRESSURE: 85 MMHG | OXYGEN SATURATION: 98 % | BODY MASS INDEX: 29.06 KG/M2 | SYSTOLIC BLOOD PRESSURE: 130 MMHG

## 2023-05-19 DIAGNOSIS — J22 LOWER RESPIRATORY INFECTION: ICD-10-CM

## 2023-05-19 DIAGNOSIS — H00.025 HORDEOLUM INTERNUM OF LEFT LOWER EYELID: ICD-10-CM

## 2023-05-19 DIAGNOSIS — J02.9 SORE THROAT: Primary | ICD-10-CM

## 2023-05-19 DIAGNOSIS — Z11.52 ENCOUNTER FOR SCREENING LABORATORY TESTING FOR COVID-19 VIRUS: ICD-10-CM

## 2023-05-19 LAB
CTP QC/QA: YES
CTP QC/QA: YES
FLUAV AG NPH QL: NEGATIVE
FLUBV AG NPH QL: NEGATIVE
S PYO RRNA THROAT QL PROBE: NEGATIVE
SARS-COV-2 AG RESP QL IA.RAPID: NEGATIVE

## 2023-05-19 PROCEDURE — 87428 POCT SARS-COV2 (COVID) WITH FLU ANTIGEN: ICD-10-PCS | Mod: QW,,, | Performed by: FAMILY MEDICINE

## 2023-05-19 PROCEDURE — 99214 PR OFFICE/OUTPT VISIT, EST, LEVL IV, 30-39 MIN: ICD-10-PCS | Mod: ,,, | Performed by: FAMILY MEDICINE

## 2023-05-19 PROCEDURE — 87428 SARSCOV & INF VIR A&B AG IA: CPT | Mod: QW,,, | Performed by: FAMILY MEDICINE

## 2023-05-19 PROCEDURE — 87880 POCT RAPID STREP A: ICD-10-PCS | Mod: QW,,, | Performed by: FAMILY MEDICINE

## 2023-05-19 PROCEDURE — 87880 STREP A ASSAY W/OPTIC: CPT | Mod: QW,,, | Performed by: FAMILY MEDICINE

## 2023-05-19 PROCEDURE — 99214 OFFICE O/P EST MOD 30 MIN: CPT | Mod: ,,, | Performed by: FAMILY MEDICINE

## 2023-05-19 RX ORDER — SULFAMETHOXAZOLE AND TRIMETHOPRIM 800; 160 MG/1; MG/1
1 TABLET ORAL 2 TIMES DAILY
Qty: 14 TABLET | Refills: 0 | Status: ON HOLD | OUTPATIENT
Start: 2023-05-19 | End: 2023-06-28

## 2023-05-19 RX ORDER — ERYTHROMYCIN 5 MG/G
OINTMENT OPHTHALMIC EVERY 6 HOURS
Qty: 3.5 G | Refills: 1 | Status: SHIPPED | OUTPATIENT
Start: 2023-05-19 | End: 2023-05-19

## 2023-05-19 RX ORDER — AZITHROMYCIN 250 MG/1
TABLET, FILM COATED ORAL
Qty: 6 TABLET | Refills: 0 | Status: ON HOLD | OUTPATIENT
Start: 2023-05-19 | End: 2023-06-28

## 2023-05-19 RX ORDER — NEOMYCIN AND POLYMYXIN B SULFATES AND BACITRACIN ZINC 400; 3.5; 1 [USP'U]/G; MG/G; [USP'U]/G
OINTMENT OPHTHALMIC 4 TIMES DAILY
Qty: 3.5 G | Refills: 0 | Status: ON HOLD | OUTPATIENT
Start: 2023-05-19 | End: 2023-06-28

## 2023-05-19 NOTE — PROGRESS NOTES
Subjective     Patient ID: Martita Hilliard is a 47 y.o. female.    Chief Complaint: Otalgia (Left ear pain. Left eye swollen possible pink eye /Patient has a cough and sore throat ), Eye Problem, Cough, Sore Throat, and Chills    Pain in both ears intermittently for 1 month.  Left lower eyelid swollen for 2-3 days.  Cough for several months she stopped smoking 2 months ago cough is nonproductive.  Chills began about 4 days ago.  She had temp up to 102 4 days ago.  No temperature in the past 24 hours.  No vomiting or diarrhea.    Otalgia   Associated symptoms include coughing and a sore throat.   Eye Problem     Cough  Associated symptoms include ear pain and a sore throat.   Sore Throat   Associated symptoms include coughing and ear pain.   Review of Systems   HENT:  Positive for ear pain and sore throat.    Respiratory:  Positive for cough.         Objective     Physical Exam  Constitutional:       Appearance: She is ill-appearing. She is not toxic-appearing.   HENT:      Right Ear: Tympanic membrane normal.      Left Ear: Tympanic membrane normal.      Nose: Congestion and rhinorrhea present.      Mouth/Throat:      Pharynx: No posterior oropharyngeal erythema.   Eyes:      General:         Left eye: Hordeolum present.No discharge.      Comments: Left lower eyelid slightly swollen.   Cardiovascular:      Rate and Rhythm: Normal rate and regular rhythm.   Pulmonary:      Effort: Pulmonary effort is normal.      Breath sounds: Rales (Very few fine scattered coarse crackles) present.   Neurological:      Mental Status: She is alert.          Assessment and Plan     Problem List Items Addressed This Visit    None  Visit Diagnoses       Sore throat    -  Primary    Relevant Orders    POCT rapid strep A (Completed)    Encounter for screening laboratory testing for COVID-19 virus        Relevant Orders    POCT SARS-COV2 (COVID) with Flu Antigen (Completed)    Lower respiratory infection        Relevant Orders    X-Ray  Chest PA And Lateral    Hordeolum internum of left lower eyelid                Azithromycin .  Patient came back to clinic and so she was allergic to azithromycin.  I then suggested doxycycline.  She wants Bactrim.  She says that is the only antibiotic that she know she can take.  Patient advised that doxycycline is a much better medication to treat lower respiratory infection.  Patient insisted on the Bactrim.  Os in her prescription for this

## 2023-05-25 ENCOUNTER — OFFICE VISIT (OUTPATIENT)
Dept: OBSTETRICS AND GYNECOLOGY | Facility: CLINIC | Age: 48
End: 2023-05-25

## 2023-05-25 VITALS
WEIGHT: 164.19 LBS | SYSTOLIC BLOOD PRESSURE: 138 MMHG | DIASTOLIC BLOOD PRESSURE: 88 MMHG | BODY MASS INDEX: 29.09 KG/M2 | HEART RATE: 67 BPM

## 2023-05-25 DIAGNOSIS — N92.0 MENORRHAGIA WITH REGULAR CYCLE: Primary | ICD-10-CM

## 2023-05-25 DIAGNOSIS — R10.2 PELVIC PAIN IN FEMALE: ICD-10-CM

## 2023-05-25 PROCEDURE — 99203 OFFICE O/P NEW LOW 30 MIN: CPT | Mod: ,,, | Performed by: OBSTETRICS & GYNECOLOGY

## 2023-05-25 PROCEDURE — 99203 PR OFFICE/OUTPT VISIT, NEW, LEVL III, 30-44 MIN: ICD-10-PCS | Mod: ,,, | Performed by: OBSTETRICS & GYNECOLOGY

## 2023-05-25 RX ORDER — ERYTHROMYCIN 5 MG/G
OINTMENT OPHTHALMIC
COMMUNITY
Start: 2023-05-19

## 2023-05-25 NOTE — PROGRESS NOTES
Subjective:      Patient ID: Martita Hilliard is a 47 y.o. female.    Chief Complaint:  Follow-up (2 wk follow up abnormal uterine bleeding)      History of Present Illness  HPI   Pt to f/u abnormal uterine bleeding and bad abdominal cramps during menses.    GYN & OB History  Patient's last menstrual period was 2023 (approximate).   Date of Last Pap: No result found    OB History    Para Term  AB Living   3         3   SAB IAB Ectopic Multiple Live Births                  # Outcome Date GA Lbr [place]/2nd Weight Sex Delivery Anes PTL Lv   3             2             1                 Review of Systems  Review of Systems   Constitutional:  Negative for activity change, appetite change, fatigue and unexpected weight change.   HENT: Negative.     Respiratory:  Negative for cough, shortness of breath and wheezing.    Cardiovascular:  Negative for chest pain, palpitations and leg swelling.   Gastrointestinal:  Negative for abdominal pain, bloating, blood in stool, constipation, diarrhea, nausea, vomiting and reflux.   Genitourinary:  Positive for menorrhagia and pelvic pain. Negative for bladder incontinence, decreased libido, dysmenorrhea, dyspareunia, dysuria, flank pain, frequency, menstrual problem, urgency, vaginal bleeding, vaginal discharge, postcoital bleeding and vaginal odor.   Musculoskeletal:  Negative for back pain.   Integumentary:  Negative for rash, acne, hair changes, mole/lesion, breast mass, nipple discharge, breast skin changes and breast tenderness.   Neurological:  Negative for headaches.   Psychiatric/Behavioral:  Negative for depression and sleep disturbance. The patient is not nervous/anxious.    Breast: Negative for asymmetry, breast self exam, lump, mass, nipple discharge, skin changes and tenderness       Objective:     Physical Exam:   Constitutional: She is oriented to person, place, and time. She appears well-developed and well-nourished.    HENT:   Head:  Normocephalic and atraumatic.    Eyes: Pupils are equal, round, and reactive to light. EOM are normal.     Cardiovascular:  Normal rate, regular rhythm and normal heart sounds.             Pulmonary/Chest: Effort normal and breath sounds normal.        Abdominal: Soft. Bowel sounds are normal.             Musculoskeletal: Normal range of motion and moves all extremeties.       Neurological: She is alert and oriented to person, place, and time. She has normal reflexes.     Psychiatric: She has a normal mood and affect. Her behavior is normal. Judgment and thought content normal.       Assessment:     1. Menorrhagia with regular cycle    2. Pelvic pain in female               Plan:     Pt scheduled for TLH, BSO on 6/28/2023  Risks, benefits and alternatives to the procedure reviewed with the pt  After discussion of the risk, alternatives, benefits, and indication of surgery, as well as the risk of surgery, questions were sought and answered and informed consent obtained to proceed with surgery. We discussed the risk of the procedures to include, but not be limited to, 1) bleeding, which could be possibly excessive, potentially requiring a blood transfusion and subsequent risk of hepatitis (1 in 300,000), transfusion reaction (<1%), and HIV (1 in 1,000,000); 2) injury to internal organs including the bowel, bladder, great vessels, nerves, and ureters, potentially requiring further surgery at that time or at a later date; 3) infection requiring a prolonged hospital stay and antibiotics with possible drainage of an abscess or wound breakdown; 4) anesthetic complications; 5) deep venous thrombosis and the risk of pulmonary emboli; 6) pneumonia; and 7) infertility and/or menopause, 8) possible death. All questions were answered and a consent form was signed. She stated she understood the above risks and desired to proceed.   All questions answered to the level of the patient's satisfaction.   Case request and orders done.    Written materials provided  Consent obtained.

## 2023-05-26 NOTE — ED PROVIDER NOTES
Encounter Date: 2023       History     Chief Complaint   Patient presents with    Vomiting     Patient presents to the ER with complaint of nausea vomiting and diarrhea.  Patient reports her symptoms been ongoing for 5-6 days.  She reports she feels weak and has been dizzy.  She denies fever but reports chills and body aches.  She states feels like she is dehydrated.  Patient reports nasal congestion and sinus drainage in her throat for 2-3 weeks.    The history is provided by the patient. No  was used.   Review of patient's allergies indicates:   Allergen Reactions    Ace inhibitors Swelling    Ferrous sulfate Anaphylaxis    Penicillins     Rocephin [ceftriaxone]     Terazol 3 [terconazole] Other (See Comments)     Vaginal irritation and swollen     Past Medical History:   Diagnosis Date    Bone disease     Degenerative    Chronic sinusitis, unspecified     Crohn's disease     History of herpes genitalis     Hypertension     Pancreatitis     Sciatica      Past Surgical History:   Procedure Laterality Date    SINUS SURGERY      TONSILLECTOMY AND ADENOIDECTOMY      TUBAL LIGATION       Family History   Problem Relation Age of Onset    Hypertension Mother     Hypertension Child      Social History     Tobacco Use    Smoking status: Former     Types: Cigarettes     Quit date: 3/1/2023     Years since quittin.2    Smokeless tobacco: Never   Substance Use Topics    Alcohol use: Never    Drug use: Not Currently     Types: Marijuana     Comment: stopped 2 months ago     Review of Systems   Constitutional:  Positive for activity change, appetite change, chills and fatigue.   HENT:  Positive for congestion, postnasal drip, sinus pressure and sinus pain.    Gastrointestinal:  Positive for diarrhea, nausea and vomiting.   Musculoskeletal:  Positive for myalgias.   Neurological:  Positive for weakness, light-headedness and headaches.   All other systems reviewed and are negative.    Physical Exam      Initial Vitals [04/25/23 1410]   BP Pulse Resp Temp SpO2   128/80 (!) 118 16 98.5 °F (36.9 °C) 100 %      MAP       --         Physical Exam    Nursing note and vitals reviewed.  Constitutional: Vital signs are normal. She appears well-developed and well-nourished. She is cooperative. She has a sickly appearance.   HENT:   Head: Normocephalic.   Right Ear: External ear normal.   Left Ear: There is tenderness. A middle ear effusion is present.   Nose: Right sinus exhibits maxillary sinus tenderness and frontal sinus tenderness. Left sinus exhibits maxillary sinus tenderness and frontal sinus tenderness.   Mouth/Throat: Mucous membranes are normal. Oropharyngeal exudate and posterior oropharyngeal erythema present.   Eyes: Conjunctivae and EOM are normal. Pupils are equal, round, and reactive to light.   Neck: Neck supple.   Normal range of motion.  Cardiovascular:  Normal rate, regular rhythm, normal heart sounds and intact distal pulses.           Pulmonary/Chest: Breath sounds normal.   Abdominal: Abdomen is soft. Bowel sounds are normal.   Musculoskeletal:         General: Normal range of motion.      Cervical back: Normal range of motion and neck supple.     Neurological: She is alert and oriented to person, place, and time. She has normal strength. GCS score is 15. GCS eye subscore is 4. GCS verbal subscore is 5. GCS motor subscore is 6.   Skin: Skin is warm and dry. Capillary refill takes less than 2 seconds.   Psychiatric: She has a normal mood and affect. Her behavior is normal. Judgment and thought content normal.       Medical Screening Exam   See Full Note    ED Course   Procedures  Labs Reviewed   URINALYSIS, REFLEX TO URINE CULTURE - Abnormal; Notable for the following components:       Result Value    Protein, UA 50 (*)     Blood, UA Moderate (*)     All other components within normal limits   COMPREHENSIVE METABOLIC PANEL - Abnormal; Notable for the following components:    Sodium 133 (*)      Potassium 2.6 (*)     Chloride 95 (*)     Anion Gap 20 (*)     Creatinine 1.10 (*)     Total Protein 8.8 (*)     Globulin 4.7 (*)     All other components within normal limits   CBC WITH DIFFERENTIAL - Abnormal; Notable for the following components:    WBC 13.24 (*)     Hemoglobin 10.5 (*)     Hematocrit 34.1 (*)     MCV 77.1 (*)     MCH 23.8 (*)     MCHC 30.8 (*)     RDW 16.6 (*)     MPV 9.1 (*)     Neutrophils % 65.6 (*)     Lymphocytes % 24.8 (*)     Monocytes % 8.1 (*)     Eosinophils % 0.5 (*)     Immature Granulocytes % 0.5 (*)     Neutrophils, Abs 8.71 (*)     Monocytes, Absolute 1.07 (*)     Immature Granulocytes, Absolute 0.06 (*)     All other components within normal limits   URINALYSIS, MICROSCOPIC - Abnormal; Notable for the following components:    Bacteria, UA Few (*)     Yeast, UA Rare (*)     Squamous Epithelial Cells, UA Few (*)     Mucus, UA Moderate (*)     Hyaline Casts, UA 0-2 (*)     Fine Granular Casts, UA 0-2 (*)     All other components within normal limits   AMYLASE - Normal   LIPASE - Normal   MAGNESIUM - Normal   SARS-COV2 (COVID) W/ FLU ANTIGEN - Normal    Narrative:     Negative SARS-CoV results should not be used as the sole basis for treatment or patient management decisions; negative results should be considered in the context of a patient's recent exposures, history and the presene of clinical signs and symptoms consistent with COVID-19.  Negative results should be treated as presumptive and confirmed by molecular assay, if necessary for patient management.   CBC W/ AUTO DIFFERENTIAL    Narrative:     The following orders were created for panel order CBC auto differential.  Procedure                               Abnormality         Status                     ---------                               -----------         ------                     CBC with Differential[[id number]]        Abnormal            Final result                 Please view results for these tests on the  individual orders.   POCT URINE PREGNANCY          Imaging Results    None          Medications   sodium chloride 0.9% bolus 1,000 mL 1,000 mL (0 mLs Intravenous Stopped 4/25/23 1642)   promethazine (PHENERGAN) 25 mg in dextrose 5 % (D5W) 50 mL IVPB (0 mg Intravenous Stopped 4/25/23 1642)   0.9%  NaCl infusion (0 mLs Intravenous Stopped 4/25/23 1924)   potassium chloride 10 mEq in 100 mL IVPB (0 mEq Intravenous Stopped 4/25/23 1832)   potassium chloride SA CR tablet 40 mEq (40 mEq Oral Given 4/25/23 1658)   sulfamethoxazole-trimethoprim 800-160mg per tablet 1 tablet (1 tablet Oral Given 4/25/23 1845)   dexAMETHasone injection 4 mg (4 mg Intravenous Given 4/25/23 1845)     Medical Decision Making:   Initial Assessment:   Patient presents to the ER with complaint of nausea vomiting and diarrhea.  Patient reports her symptoms been ongoing for 5-6 days.  She reports she feels weak and has been dizzy.  She denies fever but reports chills and body aches.  She states feels like she is dehydrated.  Patient reports nasal congestion and sinus drainage in her throat for 2-3 weeks.    Differential Diagnosis:   Viral gastroenteritis  Dehydration  Cholecystitis  Bowel obstruction  Colitis  Sinusitis  Clinical Tests:   Lab Tests: Ordered and Reviewed  The following lab test(s) were unremarkable: Urinalysis       <> Summary of Lab: White blood count 13  Potassium 2.6    COVID flu negative  UA negative  Radiological Study: Ordered and Reviewed  ED Management:  Initiate IV collect lab work  1 L normal saline bolus  Phenergan 25 mg IV piggyback to treat nausea  10 mEq potassium IV piggyback treat potassium of 2.6  40 mEq potassium p.o. to treat potassium at 2.6    Decadron 4 mg IV to treat sinusitis  Bactrim 800/160 mg p.o.-Street acute sinusitis  Patient states she is feeling better and symptoms of nausea have been relieved prior to discharge.    Patient is discharged home with diagnosis of viral gastroenteritis and acute sinusitis  and acute otitis media left ear..  She is given prescription for Bactrim, Medrol Dosepak, Atrovent nasal spray, and Zofran to take as prescribed.  She is told to follow with the primary care provider symptoms not improve in 2 days.  Patient verbalizes understanding and agrees with plan of care.                       Clinical Impression:   Final diagnoses:  [A08.4] Viral gastroenteritis (Primary)  [J01.90] Acute sinusitis with symptoms > 10 days  [H65.02] Non-recurrent acute serous otitis media of left ear        ED Disposition Condition    Discharge Stable          ED Prescriptions       Medication Sig Dispense Start Date End Date Auth. Provider    sulfamethoxazole-trimethoprim 800-160mg (BACTRIM DS) 800-160 mg Tab () Take 1 tablet by mouth 2 (two) times daily. for 7 days Patient not taking:  Reported on 2023 14 tablet 2023 PIOTR Machado    methylPREDNISolone (MEDROL DOSEPACK) 4 mg tablet () Take medication as prescribed. Patient not taking:  Reported on 2023 1 each 2023 PIOTR Machado    ipratropium (ATROVENT) 42 mcg (0.06 %) nasal spray 2 sprays by Each Nostril route 2 (two) times daily. 15 mL 2023 -- PIOTR Machado    ondansetron (ZOFRAN-ODT) 4 MG TbDL Take 1 tablet (4 mg total) by mouth every 6 (six) hours as needed (nausea vomiting). 20 tablet 2023 -- PIOTR Machado          Follow-up Information       Follow up With Specialties Details Why Contact Info    [facility]  Schedule an appointment as soon as possible for a visit in 2 days If symptoms worsen              PIOTR Machado  23 [id number]

## 2023-06-11 NOTE — PROCEDURES
Procedures  GYN Ultrasound Note:    Uterus 9.27 x 5.28 x 4.14 cm  Endometrial thickness 1.17 cm    Right ovary 2.44 x 1.81 x 2.07 cm  Left ovary 2.92 x 2.19 x 1.82 cm    Impression:  Uterus has heterogeneous texture with no definite fibroids seen   Left ovarian simple cyst 1.71 x 1.52 x 1.35 cm

## 2023-06-19 ENCOUNTER — OFFICE VISIT (OUTPATIENT)
Dept: OBSTETRICS AND GYNECOLOGY | Facility: CLINIC | Age: 48
End: 2023-06-19
Payer: COMMERCIAL

## 2023-06-19 VITALS
HEART RATE: 70 BPM | HEIGHT: 63 IN | WEIGHT: 164.63 LBS | DIASTOLIC BLOOD PRESSURE: 84 MMHG | SYSTOLIC BLOOD PRESSURE: 144 MMHG | BODY MASS INDEX: 29.17 KG/M2

## 2023-06-19 DIAGNOSIS — Z01.818 PREOP EXAMINATION: Primary | ICD-10-CM

## 2023-06-19 DIAGNOSIS — R10.2 PELVIC PAIN IN FEMALE: ICD-10-CM

## 2023-06-19 DIAGNOSIS — N92.0 MENORRHAGIA WITH REGULAR CYCLE: ICD-10-CM

## 2023-06-19 LAB
BASOPHILS # BLD AUTO: 0.09 K/UL (ref 0–0.2)
BASOPHILS NFR BLD AUTO: 0.8 % (ref 0–1)
BILIRUB UR QL STRIP: NEGATIVE
CLARITY UR: CLEAR
COLOR UR: ABNORMAL
DIFFERENTIAL METHOD BLD: ABNORMAL
EOSINOPHIL # BLD AUTO: 0.13 K/UL (ref 0–0.5)
EOSINOPHIL NFR BLD AUTO: 1.1 % (ref 1–4)
ERYTHROCYTE [DISTWIDTH] IN BLOOD BY AUTOMATED COUNT: 16.4 % (ref 11.5–14.5)
GLUCOSE UR STRIP-MCNC: NORMAL MG/DL
HCT VFR BLD AUTO: 33.3 % (ref 38–47)
HGB BLD-MCNC: 9.8 G/DL (ref 12–16)
IMM GRANULOCYTES # BLD AUTO: 0.03 K/UL (ref 0–0.04)
IMM GRANULOCYTES NFR BLD: 0.3 % (ref 0–0.4)
INDIRECT COOMBS: NORMAL
KETONES UR STRIP-SCNC: NEGATIVE MG/DL
LEUKOCYTE ESTERASE UR QL STRIP: NEGATIVE
LYMPHOCYTES # BLD AUTO: 3.29 K/UL (ref 1–4.8)
LYMPHOCYTES NFR BLD AUTO: 28.3 % (ref 27–41)
MCH RBC QN AUTO: 23 PG (ref 27–31)
MCHC RBC AUTO-ENTMCNC: 29.4 G/DL (ref 32–36)
MCV RBC AUTO: 78.2 FL (ref 80–96)
MONOCYTES # BLD AUTO: 0.58 K/UL (ref 0–0.8)
MONOCYTES NFR BLD AUTO: 5 % (ref 2–6)
MPC BLD CALC-MCNC: 10.1 FL (ref 9.4–12.4)
MUCOUS, UA: ABNORMAL /LPF
NEUTROPHILS # BLD AUTO: 7.49 K/UL (ref 1.8–7.7)
NEUTROPHILS NFR BLD AUTO: 64.5 % (ref 53–65)
NITRITE UR QL STRIP: NEGATIVE
NRBC # BLD AUTO: 0 X10E3/UL
NRBC, AUTO (.00): 0 %
PH UR STRIP: 5.5 PH UNITS
PLATELET # BLD AUTO: 449 K/UL (ref 150–400)
PROT UR QL STRIP: NEGATIVE
RBC # BLD AUTO: 4.26 M/UL (ref 4.2–5.4)
RBC # UR STRIP: ABNORMAL /UL
RBC #/AREA URNS HPF: <1 /HPF
RH BLD: NORMAL
SP GR UR STRIP: 1.02
SPECIMEN OUTDATE: NORMAL
SQUAMOUS #/AREA URNS LPF: ABNORMAL /HPF
UROBILINOGEN UR STRIP-ACNC: NORMAL MG/DL
WBC # BLD AUTO: 11.61 K/UL (ref 4.5–11)
WBC #/AREA URNS HPF: 1 /HPF

## 2023-06-19 PROCEDURE — 85025 COMPLETE CBC W/AUTO DIFF WBC: CPT | Mod: ,,, | Performed by: CLINICAL MEDICAL LABORATORY

## 2023-06-19 PROCEDURE — 86900 TYPE & SCREEN: ICD-10-PCS | Mod: ,,, | Performed by: CLINICAL MEDICAL LABORATORY

## 2023-06-19 PROCEDURE — 86850 TYPE & SCREEN: ICD-10-PCS | Mod: ,,, | Performed by: CLINICAL MEDICAL LABORATORY

## 2023-06-19 PROCEDURE — 80053 COMPREHEN METABOLIC PANEL: CPT | Mod: ,,, | Performed by: CLINICAL MEDICAL LABORATORY

## 2023-06-19 PROCEDURE — 80053 COMPREHENSIVE METABOLIC PANEL: ICD-10-PCS | Mod: ,,, | Performed by: CLINICAL MEDICAL LABORATORY

## 2023-06-19 PROCEDURE — 85025 CBC WITH DIFFERENTIAL: ICD-10-PCS | Mod: ,,, | Performed by: CLINICAL MEDICAL LABORATORY

## 2023-06-19 PROCEDURE — 86901 BLOOD TYPING SEROLOGIC RH(D): CPT | Mod: ,,, | Performed by: CLINICAL MEDICAL LABORATORY

## 2023-06-19 PROCEDURE — 36415 COLL VENOUS BLD VENIPUNCTURE: CPT | Mod: ,,, | Performed by: OBSTETRICS & GYNECOLOGY

## 2023-06-19 PROCEDURE — 81001 URINALYSIS AUTO W/SCOPE: CPT | Mod: ,,, | Performed by: CLINICAL MEDICAL LABORATORY

## 2023-06-19 PROCEDURE — 86901 TYPE & SCREEN: ICD-10-PCS | Mod: ,,, | Performed by: CLINICAL MEDICAL LABORATORY

## 2023-06-19 PROCEDURE — 36415 PR COLLECTION VENOUS BLOOD,VENIPUNCTURE: ICD-10-PCS | Mod: ,,, | Performed by: OBSTETRICS & GYNECOLOGY

## 2023-06-19 PROCEDURE — 86900 BLOOD TYPING SEROLOGIC ABO: CPT | Mod: ,,, | Performed by: CLINICAL MEDICAL LABORATORY

## 2023-06-19 PROCEDURE — 99499 NO LOS: ICD-10-PCS | Mod: ,,, | Performed by: OBSTETRICS & GYNECOLOGY

## 2023-06-19 PROCEDURE — 81001 URINALYSIS: ICD-10-PCS | Mod: ,,, | Performed by: CLINICAL MEDICAL LABORATORY

## 2023-06-19 PROCEDURE — 86850 RBC ANTIBODY SCREEN: CPT | Mod: ,,, | Performed by: CLINICAL MEDICAL LABORATORY

## 2023-06-19 PROCEDURE — 99499 UNLISTED E&M SERVICE: CPT | Mod: ,,, | Performed by: OBSTETRICS & GYNECOLOGY

## 2023-06-19 NOTE — H&P (VIEW-ONLY)
History & Physical    SUBJECTIVE:     History of Present Illness:  Patient is a 47 y.o. female presents with history of menorrhagia and pelvic pain with desire for hysterectomy.  Chief Complaint   Patient presents with    Pre-op Exam     Pt here for Pre-Op        Review of patient's allergies indicates:   Allergen Reactions    Ace inhibitors Swelling    Ferrous sulfate Anaphylaxis    Penicillins     Rocephin [ceftriaxone]     Terazol 3 [terconazole] Other (See Comments)     Vaginal irritation and swollen       No current outpatient medications on file.     No current facility-administered medications for this visit.       Past Medical History:   Diagnosis Date    Bone disease     Degenerative    Chronic sinusitis, unspecified     Crohn's disease     History of herpes genitalis     Hypertension     Pancreatitis     Sciatica      Past Surgical History:   Procedure Laterality Date    SINUS SURGERY      TONSILLECTOMY AND ADENOIDECTOMY      TUBAL LIGATION       Family History   Problem Relation Age of Onset    Hypertension Mother     Hypertension Child      Social History     Tobacco Use    Smoking status: Former     Types: Cigarettes     Quit date: 3/1/2023     Years since quittin.3    Smokeless tobacco: Never   Substance Use Topics    Alcohol use: Never    Drug use: Not Currently     Types: Marijuana     Comment: stopped 2 months ago        Review of Systems:  Review of Systems   Constitutional:  Negative for appetite change, chills, fatigue and fever.   HENT: Negative.     Eyes: Negative.    Respiratory:  Negative for cough, chest tightness and shortness of breath.    Cardiovascular:  Negative for chest pain, palpitations and leg swelling.   Gastrointestinal:  Negative for abdominal distention, abdominal pain, blood in stool, constipation, diarrhea, nausea and vomiting.   Endocrine: Negative for cold intolerance, heat intolerance, polydipsia, polyphagia and polyuria.   Genitourinary:  Positive for menstrual  "problem (menorrhagia) and pelvic pain. Negative for difficulty urinating, dyspareunia, dysuria, flank pain, frequency, urgency, vaginal bleeding, vaginal discharge and vaginal pain.   Musculoskeletal: Negative.    Skin: Negative.    Neurological: Negative.    Psychiatric/Behavioral: Negative.  Negative for agitation, behavioral problems, confusion and sleep disturbance. The patient is not nervous/anxious.      OBJECTIVE:     Vital Signs (Most Recent)  Pulse: 70 (06/19/23 1515)  BP: (!) 144/84 (06/19/23 1515)  5' 3" (1.6 m)  74.7 kg (164 lb 9.6 oz)     Physical Exam:  Physical Exam  Vitals reviewed. Exam conducted with a chaperone present.   Constitutional:       Appearance: Normal appearance.   HENT:      Head: Normocephalic and atraumatic.      Mouth/Throat:      Mouth: Mucous membranes are moist.   Eyes:      Extraocular Movements: Extraocular movements intact.      Pupils: Pupils are equal, round, and reactive to light.   Cardiovascular:      Rate and Rhythm: Normal rate and regular rhythm.      Pulses: Normal pulses.      Heart sounds: Normal heart sounds.   Pulmonary:      Effort: Pulmonary effort is normal.      Breath sounds: Normal breath sounds.   Abdominal:      General: Abdomen is flat. Bowel sounds are normal.      Palpations: Abdomen is soft.   Musculoskeletal:         General: Normal range of motion.      Cervical back: Normal range of motion.   Skin:     General: Skin is warm and dry.   Neurological:      General: No focal deficit present.      Mental Status: She is alert and oriented to person, place, and time.   Psychiatric:         Mood and Affect: Mood normal.         Behavior: Behavior normal.         Thought Content: Thought content normal.         Judgment: Judgment normal.       Laboratory  Pathology: Reviewed      Diagnostic Results:  Labs: Reviewed  US: Reviewed      ASSESSMENT/PLAN:   Martita was seen today for pre-op exam.    Diagnoses and all orders for this visit:    Preop examination  -   "   Type & Screen; Future  -     Comprehensive Metabolic Panel; Future  -     CBC Auto Differential; Future  -     Urinalysis  -     Type & Screen  -     Comprehensive Metabolic Panel  -     CBC Auto Differential  -     Urinalysis, Microscopic    Menorrhagia with regular cycle    Pelvic pain in female      There are no hospital problems to display for this patient.      PLAN:Plan   Pt scheduled for single-site TLH, BSO on 6/28/2023  Risks, benefits and alternatives to the procedure reviewed with the pt  After discussion of the risk, alternatives, benefits, and indication of surgery, as well as the risk of surgery, questions were sought and answered and informed consent obtained to proceed with surgery. We discussed the risk of the procedures to include, but not be limited to, 1) bleeding, which could be possibly excessive, potentially requiring a blood transfusion and subsequent risk of hepatitis (1 in 300,000), transfusion reaction (<1%), and HIV (1 in 1,000,000); 2) injury to internal organs including the bowel, bladder, great vessels, nerves, and ureters, potentially requiring further surgery at that time or at a later date; 3) infection requiring a prolonged hospital stay and antibiotics with possible drainage of an abscess or wound breakdown; 4) anesthetic complications; 5) deep venous thrombosis and the risk of pulmonary emboli; 6) pneumonia; and 7) infertility and/or menopause, 8) possible death. All questions were answered and a consent form was signed. She stated she understood the above risks and desired to proceed.   All questions answered to the level of the patient's satisfaction.   Case request and orders done.   Written materials provided  Consent obtained.

## 2023-06-20 LAB
ALBUMIN SERPL BCP-MCNC: 4 G/DL (ref 3.5–5)
ALBUMIN/GLOB SERPL: 1 {RATIO}
ALP SERPL-CCNC: 83 U/L (ref 39–100)
ALT SERPL W P-5'-P-CCNC: 18 U/L (ref 13–56)
ANION GAP SERPL CALCULATED.3IONS-SCNC: 10 MMOL/L (ref 7–16)
AST SERPL W P-5'-P-CCNC: 16 U/L (ref 15–37)
BILIRUB SERPL-MCNC: 0.3 MG/DL (ref ?–1.2)
BUN SERPL-MCNC: 6 MG/DL (ref 7–18)
BUN/CREAT SERPL: 9 (ref 6–20)
CALCIUM SERPL-MCNC: 9.1 MG/DL (ref 8.5–10.1)
CHLORIDE SERPL-SCNC: 108 MMOL/L (ref 98–107)
CO2 SERPL-SCNC: 25 MMOL/L (ref 21–32)
CREAT SERPL-MCNC: 0.68 MG/DL (ref 0.55–1.02)
EGFR (NO RACE VARIABLE) (RUSH/TITUS): 108 ML/MIN/1.73M2
GLOBULIN SER-MCNC: 3.9 G/DL (ref 2–4)
GLUCOSE SERPL-MCNC: 85 MG/DL (ref 74–106)
POTASSIUM SERPL-SCNC: 3.8 MMOL/L (ref 3.5–5.1)
PROT SERPL-MCNC: 7.9 G/DL (ref 6.4–8.2)
SODIUM SERPL-SCNC: 139 MMOL/L (ref 136–145)

## 2023-06-26 DIAGNOSIS — Z01.818 PRE-OP TESTING: Primary | ICD-10-CM

## 2023-06-28 ENCOUNTER — ANESTHESIA EVENT (OUTPATIENT)
Dept: SURGERY | Facility: HOSPITAL | Age: 48
End: 2023-06-28
Payer: COMMERCIAL

## 2023-06-28 ENCOUNTER — HOSPITAL ENCOUNTER (OUTPATIENT)
Facility: HOSPITAL | Age: 48
Discharge: HOME | End: 2023-06-28
Attending: OBSTETRICS & GYNECOLOGY | Admitting: OBSTETRICS & GYNECOLOGY
Payer: COMMERCIAL

## 2023-06-28 ENCOUNTER — ANESTHESIA (OUTPATIENT)
Dept: SURGERY | Facility: HOSPITAL | Age: 48
End: 2023-06-28
Payer: COMMERCIAL

## 2023-06-28 VITALS
RESPIRATION RATE: 18 BRPM | SYSTOLIC BLOOD PRESSURE: 142 MMHG | HEIGHT: 63 IN | BODY MASS INDEX: 29.06 KG/M2 | OXYGEN SATURATION: 100 % | TEMPERATURE: 99 F | DIASTOLIC BLOOD PRESSURE: 73 MMHG | HEART RATE: 80 BPM | WEIGHT: 164 LBS

## 2023-06-28 DIAGNOSIS — N92.0 MENORRHAGIA WITH REGULAR CYCLE: ICD-10-CM

## 2023-06-28 DIAGNOSIS — R10.2 PELVIC PAIN IN FEMALE: ICD-10-CM

## 2023-06-28 DIAGNOSIS — Z90.710 STATUS POST LAPAROSCOPIC HYSTERECTOMY: Primary | ICD-10-CM

## 2023-06-28 LAB
ANISOCYTOSIS BLD QL SMEAR: ABNORMAL
B-HCG UR QL: NEGATIVE
BASOPHILS # BLD AUTO: 0.04 K/UL (ref 0–0.2)
BASOPHILS NFR BLD AUTO: 0.2 % (ref 0–1)
CTP QC/QA: YES
DIFFERENTIAL METHOD BLD: ABNORMAL
EOSINOPHIL # BLD AUTO: 0 K/UL (ref 0–0.5)
EOSINOPHIL NFR BLD AUTO: 0 % (ref 1–4)
ERYTHROCYTE [DISTWIDTH] IN BLOOD BY AUTOMATED COUNT: 16.5 % (ref 11.5–14.5)
HCT VFR BLD AUTO: 32.6 % (ref 38–47)
HGB BLD-MCNC: 9.9 G/DL (ref 12–16)
HYPOCHROMIA BLD QL SMEAR: ABNORMAL
IMM GRANULOCYTES # BLD AUTO: 0.08 K/UL (ref 0–0.04)
IMM GRANULOCYTES NFR BLD: 0.5 % (ref 0–0.4)
LYMPHOCYTES # BLD AUTO: 0.67 K/UL (ref 1–4.8)
LYMPHOCYTES NFR BLD AUTO: 4 % (ref 27–41)
LYMPHOCYTES NFR BLD MANUAL: 5 % (ref 27–41)
MCH RBC QN AUTO: 23.8 PG (ref 27–31)
MCHC RBC AUTO-ENTMCNC: 30.4 G/DL (ref 32–36)
MCV RBC AUTO: 78.4 FL (ref 80–96)
MICROCYTES BLD QL SMEAR: ABNORMAL
MONOCYTES # BLD AUTO: 0.1 K/UL (ref 0–0.8)
MONOCYTES NFR BLD AUTO: 0.6 % (ref 2–6)
MPC BLD CALC-MCNC: 9.8 FL (ref 9.4–12.4)
NEUTROPHILS # BLD AUTO: 15.94 K/UL (ref 1.8–7.7)
NEUTROPHILS NFR BLD AUTO: 94.7 % (ref 53–65)
NEUTS BAND NFR BLD MANUAL: 2 % (ref 1–5)
NEUTS SEG NFR BLD MANUAL: 93 % (ref 50–62)
NRBC # BLD AUTO: 0 X10E3/UL
NRBC, AUTO (.00): 0 %
OVALOCYTES BLD QL SMEAR: ABNORMAL
PLATELET # BLD AUTO: 378 K/UL (ref 150–400)
PLATELET MORPHOLOGY: ABNORMAL
POLYCHROMASIA BLD QL SMEAR: ABNORMAL
RBC # BLD AUTO: 4.16 M/UL (ref 4.2–5.4)
WBC # BLD AUTO: 16.83 K/UL (ref 4.5–11)

## 2023-06-28 PROCEDURE — 58571 TLH W/T/O 250 G OR LESS: CPT | Mod: ,,, | Performed by: OBSTETRICS & GYNECOLOGY

## 2023-06-28 PROCEDURE — 27201423 OPTIME MED/SURG SUP & DEVICES STERILE SUPPLY: Performed by: OBSTETRICS & GYNECOLOGY

## 2023-06-28 PROCEDURE — 36000711: Performed by: OBSTETRICS & GYNECOLOGY

## 2023-06-28 PROCEDURE — 37000008 HC ANESTHESIA 1ST 15 MINUTES: Performed by: OBSTETRICS & GYNECOLOGY

## 2023-06-28 PROCEDURE — D9220A PRA ANESTHESIA: ICD-10-PCS | Mod: CRNA,,, | Performed by: NURSE ANESTHETIST, CERTIFIED REGISTERED

## 2023-06-28 PROCEDURE — 71000016 HC POSTOP RECOV ADDL HR: Performed by: OBSTETRICS & GYNECOLOGY

## 2023-06-28 PROCEDURE — 25000003 PHARM REV CODE 250: Performed by: ANESTHESIOLOGY

## 2023-06-28 PROCEDURE — D9220A PRA ANESTHESIA: ICD-10-PCS | Mod: ANES,,, | Performed by: ANESTHESIOLOGY

## 2023-06-28 PROCEDURE — C2628 CATHETER, OCCLUSION: HCPCS | Performed by: OBSTETRICS & GYNECOLOGY

## 2023-06-28 PROCEDURE — 88307 SURGICAL PATHOLOGY: ICD-10-PCS | Mod: 26,,, | Performed by: PATHOLOGY

## 2023-06-28 PROCEDURE — D9220A PRA ANESTHESIA: Mod: CRNA,,, | Performed by: NURSE ANESTHETIST, CERTIFIED REGISTERED

## 2023-06-28 PROCEDURE — 63600175 PHARM REV CODE 636 W HCPCS: Performed by: NURSE ANESTHETIST, CERTIFIED REGISTERED

## 2023-06-28 PROCEDURE — D9220A PRA ANESTHESIA: Mod: ANES,,, | Performed by: ANESTHESIOLOGY

## 2023-06-28 PROCEDURE — 63600175 PHARM REV CODE 636 W HCPCS: Performed by: ANESTHESIOLOGY

## 2023-06-28 PROCEDURE — 37000009 HC ANESTHESIA EA ADD 15 MINS: Performed by: OBSTETRICS & GYNECOLOGY

## 2023-06-28 PROCEDURE — 81025 URINE PREGNANCY TEST: CPT | Performed by: OBSTETRICS & GYNECOLOGY

## 2023-06-28 PROCEDURE — 85025 COMPLETE CBC W/AUTO DIFF WBC: CPT | Performed by: OBSTETRICS & GYNECOLOGY

## 2023-06-28 PROCEDURE — 58571 PR LAPAROSCOPY W TOT HYSTERECTUTERUS <=250 GRAM  W TUBE/OVARY: ICD-10-PCS | Mod: ,,, | Performed by: OBSTETRICS & GYNECOLOGY

## 2023-06-28 PROCEDURE — 27000655: Performed by: ANESTHESIOLOGY

## 2023-06-28 PROCEDURE — 71000033 HC RECOVERY, INTIAL HOUR: Performed by: OBSTETRICS & GYNECOLOGY

## 2023-06-28 PROCEDURE — 27000165 HC TUBE, ETT CUFFED: Performed by: ANESTHESIOLOGY

## 2023-06-28 PROCEDURE — 36000710: Performed by: OBSTETRICS & GYNECOLOGY

## 2023-06-28 PROCEDURE — 88307 TISSUE EXAM BY PATHOLOGIST: CPT | Mod: 26,,, | Performed by: PATHOLOGY

## 2023-06-28 PROCEDURE — 88307 TISSUE EXAM BY PATHOLOGIST: CPT | Mod: TC,SUR | Performed by: OBSTETRICS & GYNECOLOGY

## 2023-06-28 PROCEDURE — 25000003 PHARM REV CODE 250: Performed by: OBSTETRICS & GYNECOLOGY

## 2023-06-28 PROCEDURE — 27000689 HC BLADE LARYNGOSCOPE ANY SIZE: Performed by: ANESTHESIOLOGY

## 2023-06-28 PROCEDURE — 25000003 PHARM REV CODE 250: Performed by: NURSE ANESTHETIST, CERTIFIED REGISTERED

## 2023-06-28 PROCEDURE — 71000015 HC POSTOP RECOV 1ST HR: Performed by: OBSTETRICS & GYNECOLOGY

## 2023-06-28 RX ORDER — DIPHENHYDRAMINE HCL 25 MG
25 CAPSULE ORAL EVERY 4 HOURS PRN
Status: DISCONTINUED | OUTPATIENT
Start: 2023-06-28 | End: 2023-06-28 | Stop reason: HOSPADM

## 2023-06-28 RX ORDER — SODIUM CHLORIDE 9 MG/ML
INJECTION, SOLUTION INTRAVENOUS CONTINUOUS
Status: DISCONTINUED | OUTPATIENT
Start: 2023-06-28 | End: 2023-06-28 | Stop reason: HOSPADM

## 2023-06-28 RX ORDER — ONDANSETRON 4 MG/1
4 TABLET, FILM COATED ORAL ONCE
Status: COMPLETED | OUTPATIENT
Start: 2023-06-28 | End: 2023-06-28

## 2023-06-28 RX ORDER — PROMETHAZINE HYDROCHLORIDE 25 MG/ML
25 INJECTION, SOLUTION INTRAMUSCULAR; INTRAVENOUS ONCE
Status: COMPLETED | OUTPATIENT
Start: 2023-06-28 | End: 2023-06-28

## 2023-06-28 RX ORDER — DIMENHYDRINATE 50 MG
50 TABLET ORAL ONCE
Status: COMPLETED | OUTPATIENT
Start: 2023-06-28 | End: 2023-06-28

## 2023-06-28 RX ORDER — SIMETHICONE 80 MG
80 TABLET,CHEWABLE ORAL EVERY 4 HOURS PRN
Status: DISCONTINUED | OUTPATIENT
Start: 2023-06-28 | End: 2023-06-28 | Stop reason: HOSPADM

## 2023-06-28 RX ORDER — KETOROLAC TROMETHAMINE 30 MG/ML
INJECTION, SOLUTION INTRAMUSCULAR; INTRAVENOUS
Status: DISCONTINUED | OUTPATIENT
Start: 2023-06-28 | End: 2023-06-28

## 2023-06-28 RX ORDER — DIPHENHYDRAMINE HYDROCHLORIDE 50 MG/ML
25 INJECTION INTRAMUSCULAR; INTRAVENOUS EVERY 6 HOURS PRN
Status: DISCONTINUED | OUTPATIENT
Start: 2023-06-28 | End: 2023-06-28 | Stop reason: HOSPADM

## 2023-06-28 RX ORDER — ONDANSETRON 2 MG/ML
INJECTION INTRAMUSCULAR; INTRAVENOUS
Status: DISCONTINUED | OUTPATIENT
Start: 2023-06-28 | End: 2023-06-28

## 2023-06-28 RX ORDER — MIDAZOLAM HYDROCHLORIDE 1 MG/ML
INJECTION INTRAMUSCULAR; INTRAVENOUS
Status: DISCONTINUED | OUTPATIENT
Start: 2023-06-28 | End: 2023-06-28

## 2023-06-28 RX ORDER — MORPHINE SULFATE 10 MG/ML
4 INJECTION INTRAMUSCULAR; INTRAVENOUS; SUBCUTANEOUS EVERY 5 MIN PRN
Status: DISCONTINUED | OUTPATIENT
Start: 2023-06-28 | End: 2023-06-28 | Stop reason: HOSPADM

## 2023-06-28 RX ORDER — MORPHINE SULFATE 10 MG/ML
3 INJECTION INTRAMUSCULAR; INTRAVENOUS; SUBCUTANEOUS
Status: DISCONTINUED | OUTPATIENT
Start: 2023-06-28 | End: 2023-06-28 | Stop reason: HOSPADM

## 2023-06-28 RX ORDER — SCOLOPAMINE TRANSDERMAL SYSTEM 1 MG/1
1 PATCH, EXTENDED RELEASE TRANSDERMAL
Status: DISCONTINUED | OUTPATIENT
Start: 2023-06-28 | End: 2023-06-28 | Stop reason: HOSPADM

## 2023-06-28 RX ORDER — LIDOCAINE HYDROCHLORIDE 20 MG/ML
INJECTION, SOLUTION EPIDURAL; INFILTRATION; INTRACAUDAL; PERINEURAL
Status: DISCONTINUED | OUTPATIENT
Start: 2023-06-28 | End: 2023-06-28

## 2023-06-28 RX ORDER — CLINDAMYCIN PHOSPHATE 900 MG/50ML
INJECTION, SOLUTION INTRAVENOUS
Status: DISCONTINUED | OUTPATIENT
Start: 2023-06-28 | End: 2023-06-28

## 2023-06-28 RX ORDER — SODIUM CHLORIDE 9 MG/ML
INJECTION, SOLUTION INTRAVENOUS CONTINUOUS PRN
Status: DISCONTINUED | OUTPATIENT
Start: 2023-06-28 | End: 2023-06-28

## 2023-06-28 RX ORDER — HYDROMORPHONE HYDROCHLORIDE 2 MG/ML
0.5 INJECTION, SOLUTION INTRAMUSCULAR; INTRAVENOUS; SUBCUTANEOUS EVERY 5 MIN PRN
Status: DISCONTINUED | OUTPATIENT
Start: 2023-06-28 | End: 2023-06-28 | Stop reason: HOSPADM

## 2023-06-28 RX ORDER — PROPOFOL 10 MG/ML
VIAL (ML) INTRAVENOUS
Status: DISCONTINUED | OUTPATIENT
Start: 2023-06-28 | End: 2023-06-28

## 2023-06-28 RX ORDER — FENTANYL CITRATE 50 UG/ML
INJECTION, SOLUTION INTRAMUSCULAR; INTRAVENOUS
Status: DISCONTINUED | OUTPATIENT
Start: 2023-06-28 | End: 2023-06-28

## 2023-06-28 RX ORDER — DEXAMETHASONE SODIUM PHOSPHATE 4 MG/ML
INJECTION, SOLUTION INTRA-ARTICULAR; INTRALESIONAL; INTRAMUSCULAR; INTRAVENOUS; SOFT TISSUE
Status: DISCONTINUED | OUTPATIENT
Start: 2023-06-28 | End: 2023-06-28

## 2023-06-28 RX ORDER — MUPIROCIN 20 MG/G
1 OINTMENT TOPICAL 2 TIMES DAILY
Status: DISCONTINUED | OUTPATIENT
Start: 2023-06-28 | End: 2023-06-28 | Stop reason: HOSPADM

## 2023-06-28 RX ORDER — ROCURONIUM BROMIDE 10 MG/ML
INJECTION, SOLUTION INTRAVENOUS
Status: DISCONTINUED | OUTPATIENT
Start: 2023-06-28 | End: 2023-06-28

## 2023-06-28 RX ORDER — HYDROCODONE BITARTRATE AND ACETAMINOPHEN 5; 325 MG/1; MG/1
1 TABLET ORAL EVERY 4 HOURS PRN
Status: DISCONTINUED | OUTPATIENT
Start: 2023-06-28 | End: 2023-06-28 | Stop reason: HOSPADM

## 2023-06-28 RX ORDER — DIAZEPAM 5 MG/1
10 TABLET ORAL ONCE
Status: COMPLETED | OUTPATIENT
Start: 2023-06-28 | End: 2023-06-28

## 2023-06-28 RX ORDER — MEPERIDINE HYDROCHLORIDE 25 MG/ML
25 INJECTION INTRAMUSCULAR; INTRAVENOUS; SUBCUTANEOUS EVERY 10 MIN PRN
Status: DISCONTINUED | OUTPATIENT
Start: 2023-06-28 | End: 2023-06-28 | Stop reason: HOSPADM

## 2023-06-28 RX ORDER — PROCHLORPERAZINE EDISYLATE 5 MG/ML
5 INJECTION INTRAMUSCULAR; INTRAVENOUS EVERY 6 HOURS PRN
Status: DISCONTINUED | OUTPATIENT
Start: 2023-06-28 | End: 2023-06-28 | Stop reason: HOSPADM

## 2023-06-28 RX ORDER — ONDANSETRON 2 MG/ML
4 INJECTION INTRAMUSCULAR; INTRAVENOUS DAILY PRN
Status: DISCONTINUED | OUTPATIENT
Start: 2023-06-28 | End: 2023-06-28 | Stop reason: HOSPADM

## 2023-06-28 RX ORDER — ONDANSETRON 4 MG/1
8 TABLET, ORALLY DISINTEGRATING ORAL EVERY 8 HOURS PRN
Status: DISCONTINUED | OUTPATIENT
Start: 2023-06-28 | End: 2023-06-28 | Stop reason: HOSPADM

## 2023-06-28 RX ORDER — HYDROCODONE BITARTRATE AND ACETAMINOPHEN 7.5; 325 MG/1; MG/1
1 TABLET ORAL EVERY 6 HOURS PRN
Qty: 28 TABLET | Refills: 0 | OUTPATIENT
Start: 2023-06-28 | End: 2023-11-24

## 2023-06-28 RX ADMIN — PROPOFOL 200 MG: 10 INJECTION, EMULSION INTRAVENOUS at 09:06

## 2023-06-28 RX ADMIN — SODIUM CHLORIDE: 9 INJECTION, SOLUTION INTRAVENOUS at 08:06

## 2023-06-28 RX ADMIN — SODIUM CHLORIDE, POTASSIUM CHLORIDE, SODIUM LACTATE AND CALCIUM CHLORIDE: 600; 310; 30; 20 INJECTION, SOLUTION INTRAVENOUS at 11:06

## 2023-06-28 RX ADMIN — PROMETHAZINE HYDROCHLORIDE 25 MG: 25 INJECTION INTRAMUSCULAR; INTRAVENOUS at 04:06

## 2023-06-28 RX ADMIN — ONDANSETRON 4 MG: 2 INJECTION INTRAMUSCULAR; INTRAVENOUS at 10:06

## 2023-06-28 RX ADMIN — DIMENHYDRINATE 50 MG: 50 TABLET ORAL at 08:06

## 2023-06-28 RX ADMIN — MORPHINE SULFATE 4 MG: 10 INJECTION INTRAVENOUS at 12:06

## 2023-06-28 RX ADMIN — FENTANYL CITRATE 100 MCG: 50 INJECTION INTRAMUSCULAR; INTRAVENOUS at 09:06

## 2023-06-28 RX ADMIN — DEXAMETHASONE SODIUM PHOSPHATE 8 MG: 4 INJECTION, SOLUTION INTRA-ARTICULAR; INTRALESIONAL; INTRAMUSCULAR; INTRAVENOUS; SOFT TISSUE at 10:06

## 2023-06-28 RX ADMIN — SUGAMMADEX 200 MG: 100 INJECTION, SOLUTION INTRAVENOUS at 11:06

## 2023-06-28 RX ADMIN — SODIUM CHLORIDE: 9 INJECTION, SOLUTION INTRAVENOUS at 09:06

## 2023-06-28 RX ADMIN — DIAZEPAM 10 MG: 5 TABLET ORAL at 08:06

## 2023-06-28 RX ADMIN — ROCURONIUM BROMIDE 40 MG: 10 INJECTION, SOLUTION INTRAVENOUS at 09:06

## 2023-06-28 RX ADMIN — LIDOCAINE HYDROCHLORIDE 40 MG: 20 INJECTION, SOLUTION INTRAVENOUS at 09:06

## 2023-06-28 RX ADMIN — ROCURONIUM BROMIDE 10 MG: 10 INJECTION, SOLUTION INTRAVENOUS at 10:06

## 2023-06-28 RX ADMIN — FENTANYL CITRATE 100 MCG: 50 INJECTION INTRAMUSCULAR; INTRAVENOUS at 10:06

## 2023-06-28 RX ADMIN — MIDAZOLAM 2 MG: 1 INJECTION INTRAMUSCULAR; INTRAVENOUS at 09:06

## 2023-06-28 RX ADMIN — CLINDAMYCIN IN 5 PERCENT DEXTROSE 900 MG: 18 INJECTION, SOLUTION INTRAVENOUS at 09:06

## 2023-06-28 RX ADMIN — ONDANSETRON 4 MG: 2 INJECTION INTRAMUSCULAR; INTRAVENOUS at 02:06

## 2023-06-28 RX ADMIN — SCOPALAMINE 1 PATCH: 1 PATCH, EXTENDED RELEASE TRANSDERMAL at 04:06

## 2023-06-28 RX ADMIN — ONDANSETRON HYDROCHLORIDE 4 MG: 4 TABLET, FILM COATED ORAL at 08:06

## 2023-06-28 RX ADMIN — KETOROLAC TROMETHAMINE 30 MG: 30 INJECTION, SOLUTION INTRAMUSCULAR at 11:06

## 2023-06-28 NOTE — OP NOTE
DATE OF PROCEDURE: 06/28/2023    PREOPERATIVE DIAGNOSES:   1. Menorrhagia  2. Pelvic pain       POSTOPERATIVE DIAGNOSES:   1. same      PROCEDURE: single- site TLH, BSO    SURGEON: Christian Jurado MD    ASSISTANT: Jaclyn Aviles    ANESTHESIA: general    ESTIMATED BLOOD LOSS: 30 mL.     COMPLICATIONS: None.     FINDINGS: uterus sounds to 9 cm    PROCEDURE IN DETAIL:   Following informed consent the patient was taken to the operating room where general anesthesia was administered without difficulty. She was prepped and draped in usual sterile fashion placed dorsolithotomy position. A weighted speculum was placed the vagina and the anterior lip cervix grasped single-toothed tenaculum. The uterus was gently sounded and found to be 8 cm in length. A V care was then inserted into the uterus for adequate manipulation. Attention was then turned to the abdomen where a 3 mm incision was made in the umbilicus with the scalpel. The fascial incision was entered with the Bustillos scissors and extended laterally. The peritoneum was entered manually with ease. The single site gel port was then introduced to the abdomen with ease through the umbilical incision and the pneumoperitoneum achieved with 3 L of CO2 gas. The patient was then placed in deep Trendelenburg position and a pelvic exam with the findings noted above. The IP ligament was grasped with the Ligasure device , cauterized and  transected with hemostasis assured removing the left ovary.. The round ligament on the left was clamped cauterized transected hemostasis found be satisfactory. Anterior posterior leafs of the broad ligament were entered using the scissors and bladder flap created to midline with ease. The steps were then repeated on the right with hemostasis found be satisfactory. The uterus and cervix were then amputated using the J-hook along the co-ring and the specimen delivered through the vagina with ease. The vaginal cuff was then closed with number 1V LOC  starting the right angle suturing across to left and back in the midline. The vaginal cuff was found be hemostatic and airtight. The pelvis was then copiously  irrigated with normal saline dissatisfaction. The right and left ureters were identified and found to be peristalsing nicely. The pneumoperitoneum evacuated and the gel port removed from the umbilicus. The fascial incision was repaired with 0 Vicryl in running fashion. The skin was closed with 4-0 Vicryl and Surgicel. The patient was given Flurosene iv and an intraoperative cystoscopy. Performed with eflux of urine noted from both the right and left ureters. All instrument removed from the patient was waking general anesthesia and taken cover room stable condition. At the end of the procedure all sponge lap needle and instrument counts correct ×2

## 2023-06-28 NOTE — ANESTHESIA POSTPROCEDURE EVALUATION
Anesthesia Post Evaluation    Patient: Martita Hilliard    Procedure(s) Performed: Procedure(s) (LRB):  HYSTERECTOMY,TOTAL,LAPAROSCOPIC,WITH SALPINGO-OOPHORECTOMY (Bilateral)  CYSTOSCOPY (N/A)    Final Anesthesia Type: general      Patient location during evaluation: PACU  Patient participation: Yes- Able to Participate  Level of consciousness: awake and alert  Post-procedure vital signs: reviewed and stable  Pain management: adequate  Airway patency: patent  SOFIE mitigation strategies: Multimodal analgesia and Use of major conduction anesthesia (spinal/epidural) or peripheral nerve block  PONV status at discharge: No PONV  Anesthetic complications: no      Cardiovascular status: blood pressure returned to baseline  Respiratory status: unassisted  Hydration status: euvolemic  Follow-up not needed.          Vitals Value Taken Time   /73 06/28/23 1715   Temp 37.2 °C (98.9 °F) 06/28/23 1715   Pulse 71 06/28/23 1707   Resp 18 06/28/23 1715   SpO2 100 % 06/28/23 1715   Vitals shown include unvalidated device data.      Event Time   Out of Recovery 12:22:00         Pain/Mikey Score: Pain Rating Prior to Med Admin: 7 (6/28/2023 12:06 PM)  Mikey Score: 10 (6/28/2023  5:18 PM)  Modified Mikey Score: 17 (6/28/2023  5:19 PM)

## 2023-06-28 NOTE — ANESTHESIA PROCEDURE NOTES
Intubation    Date/Time: 6/28/2023 9:57 AM  Performed by: Kerline Lombardo CRNA  Authorized by: Angelo Dunlap MD     Intubation:     Induction:  Intravenous    Intubated:  Postinduction    Mask Ventilation:  Easy mask    Attempts:  1    Attempted By:  CRNA    Method of Intubation:  Direct    Blade:  Chacho 3    Laryngeal View Grade: Grade I - full view of cords      Difficult Airway Encountered?: No      Complications:  None    Airway Device:  Oral endotracheal tube    Airway Device Size:  6.5    Style/Cuff Inflation:  Cuffed (inflated to minimal occlusive pressure)    Tube secured:  20    Secured at:  The lips    Placement Verified By:  Capnometry    Complicating Factors:  None    Findings Post-Intubation:  BS equal bilateral and atraumatic/condition of teeth unchanged

## 2023-06-28 NOTE — DISCHARGE INSTRUCTIONS
May shower in 24hrs. No tub baths. No heavy lifting, no strenuous exercise. Pelvic rest - no tampons, no douching, no vaginal intercourse

## 2023-06-28 NOTE — ANESTHESIA PREPROCEDURE EVALUATION
06/28/2023  Martita Hilliard is a 47 y.o., female.      Pre-op Assessment    I have reviewed the Patient Summary Reports.     I have reviewed the Nursing Notes. I have reviewed the NPO Status.   I have reviewed the Medications.     Review of Systems  Anesthesia Hx:  No problems with previous Anesthesia  Denies Family Hx of Anesthesia complications.   Denies Personal Hx of Anesthesia complications.   Social:  Non-Smoker, No Alcohol Use, Former Smoker    Hematology/Oncology:  Hematology Normal   Oncology Normal     EENT/Dental:EENT/Dental Normal   Cardiovascular:   Hypertension    Pulmonary:   Shortness of breath    Renal/:  Renal/ Normal     Hepatic/GI:  Hepatic/GI Normal    Musculoskeletal:  Musculoskeletal Normal    Neurological:  Neurology Normal    Endocrine:  Endocrine Normal    Dermatological:  Skin Normal    Psych:  Psychiatric Normal           Physical Exam  General: Well nourished, Cooperative, Alert and Oriented    Airway:  Mallampati: II / II  Mouth Opening: Normal  TM Distance: Normal  Neck ROM: Normal ROM    Dental:  Intact    Chest/Lungs:  Clear to auscultation    Heart:  Rate: Normal  Rhythm: Regular Rhythm  Sounds: Normal        Chemistry        Component Value Date/Time     06/26/2023 1150    K 3.7 06/26/2023 1150     06/26/2023 1150    CO2 28 06/26/2023 1150    BUN 4 (L) 06/26/2023 1150    CREATININE 0.70 06/26/2023 1150     06/26/2023 1150        Component Value Date/Time    CALCIUM 9.0 06/26/2023 1150    ALKPHOS 84 06/26/2023 1150    AST 18 06/26/2023 1150    ALT 14 06/26/2023 1150    BILITOT 0.3 06/26/2023 1150    ESTGFRAFRICA 104 04/07/2021 1610    EGFRNONAA 86 04/07/2021 1610        Lab Results   Component Value Date    WBC 8.67 06/26/2023    HGB 9.3 (L) 06/26/2023    HCT 31.1 (L) 06/26/2023     06/26/2023     Results for orders placed or performed during the  hospital encounter of 12/03/22   EKG 12-lead    Collection Time: 12/04/22 12:28 AM    Narrative    Test Reason : R06.02,    Vent. Rate : 081 BPM     Atrial Rate : 081 BPM     P-R Int : 168 ms          QRS Dur : 072 ms      QT Int : 420 ms       P-R-T Axes : 075 023 002 degrees     QTc Int : 487 ms    Normal sinus rhythm with sinus arrhythmia  Septal infarct ,age undetermined  Abnormal ECG  No previous ECGs available  Confirmed by Edgardo CAIN, Janet DISLA ([id number]) on 12/4/2022 10:36:10 PM    Referred By: GERSON   SELF           Confirmed By:Janet Reynoso MD         Anesthesia Plan  Type of Anesthesia, risks & benefits discussed:    Anesthesia Type: Gen ETT, Regional  Intra-op Monitoring Plan: Standard ASA Monitors  Post Op Pain Control Plan: multimodal analgesia and peripheral nerve block  Induction:  IV  Airway Plan: Direct  Informed Consent: Informed consent signed with the Patient and all parties understand the risks and agree with anesthesia plan.  All questions answered.   ASA Score: 2  Day of Surgery Review of History & Physical: H&P Update referred to the surgeon/provider.I have interviewed and examined the patient. I have reviewed the patient's H&P dated: There are no significant changes.     Ready For Surgery From Anesthesia Perspective.     .

## 2023-06-28 NOTE — OR NURSING
1135 Rec'd pt to PACU drowsy but arousable to verbal stimuli. No signs of distress noted, respirations even and unlabored. VSS. Abd lap site x1 C/D/I, scant amount of blood noted to leandra pad. Peralta to gravity patent, secure, intact with no kinks or obstructions noted. Denies pain/needs. Will continue to monitor.     1155 Peralta catheter removed. 10 ml saline aspirated from bulb, catheter removed in one continuous motion. Catheter tip intact. Pt tolerated well.     1206 Pt c/o pain 7/10. IV morphine given, see MAR for admin.     1220 Out of PACU. VSS. No signs of bleeding/distress noted.     1225 Pt to ASC 3 asleep but arousable to verbal stimuli. No signs of distress noted, respirations even and unlabored. Family at bedside. Bedside report given to DEVONTE Villalobos RN. Abd lap site x1 C/D/I, scant amount of blood noted to leandra pad. C/o slight pain, denies other needs. /71, P 76, R 16, O2 95% RA.

## 2023-06-28 NOTE — TRANSFER OF CARE
"Anesthesia Transfer of Care Note    Patient: Martita Hilliard    Procedure(s) Performed: Procedure(s) (LRB):  HYSTERECTOMY,TOTAL,LAPAROSCOPIC,WITH SALPINGO-OOPHORECTOMY (Bilateral)  CYSTOSCOPY (N/A)    Patient location: PACU    Anesthesia Type: general    Transport from OR: Transported from OR on 6-10 L/min O2 by face mask with adequate spontaneous ventilation    Post pain: adequate analgesia    Post assessment: no apparent anesthetic complications    Post vital signs: stable    Level of consciousness: responds to stimulation and awake    Nausea/Vomiting: no nausea/vomiting    Complications: none    Transfer of care protocol was followedComments: Good SV continue, NAD noted, VSS, RTRN      Last vitals:   Visit Vitals  /66   Pulse 72   Temp 36.6 °C (97.9 °F)   Resp (!) 21   Ht 5' 3" (1.6 m)   Wt 74.4 kg (164 lb)   SpO2 95%   Breastfeeding No   BMI 29.05 kg/m²     "

## 2023-06-29 LAB
ESTROGEN SERPL-MCNC: NORMAL PG/ML
INSULIN SERPL-ACNC: NORMAL U[IU]/ML
LAB AP GROSS DESCRIPTION: NORMAL
LAB AP LABORATORY NOTES: NORMAL
T3RU NFR SERPL: NORMAL %

## 2023-06-30 NOTE — DISCHARGE SUMMARY
[facility] - Periop Services  Discharge Note  Short Stay    Procedure(s) (LRB):  HYSTERECTOMY,TOTAL,LAPAROSCOPIC,WITH SALPINGO-OOPHORECTOMY (Bilateral)  CYSTOSCOPY (N/A)      OUTCOME: Patient tolerated treatment/procedure well without complication and is now ready for discharge.    DISPOSITION: Home or Self Care    FINAL DIAGNOSIS:  Status post laparoscopic hysterectomy    FOLLOWUP: In clinic    DISCHARGE INSTRUCTIONS:    Discharge Procedure Orders   Diet general     Lifting restrictions     Other restrictions (specify):   Order Comments: Pelvic rest     Call MD for:  extreme fatigue     Call MD for:  persistent dizziness or light-headedness     Call MD for:  hives     Call MD for:  redness, tenderness, or signs of infection (pain, swelling, redness, odor or green/yellow discharge around incision site)     Call MD for:  difficulty breathing, headache or visual disturbances     Call MD for:  severe uncontrolled pain     Call MD for:  persistent nausea and vomiting     Call MD for:  temperature >100.4         Clinical Reference Documents Added to Patient Instructions         Document    HYSTERECTOMY DISCHARGE INSTRUCTIONS (ENGLISH)            TIME SPENT ON DISCHARGE: 5 minutes

## 2023-07-12 ENCOUNTER — OFFICE VISIT (OUTPATIENT)
Dept: OBSTETRICS AND GYNECOLOGY | Facility: CLINIC | Age: 48
End: 2023-07-12
Payer: COMMERCIAL

## 2023-07-12 VITALS
RESPIRATION RATE: 18 BRPM | TEMPERATURE: 98 F | OXYGEN SATURATION: 99 % | BODY MASS INDEX: 29.17 KG/M2 | HEIGHT: 63 IN | WEIGHT: 164.63 LBS

## 2023-07-12 DIAGNOSIS — Z90.710 STATUS POST LAPAROSCOPIC HYSTERECTOMY: Primary | ICD-10-CM

## 2023-07-12 DIAGNOSIS — Z87.891 HISTORY OF SMOKING: ICD-10-CM

## 2023-07-12 PROCEDURE — 99024 PR POST-OP FOLLOW-UP VISIT: ICD-10-PCS | Mod: ,,, | Performed by: ADVANCED PRACTICE MIDWIFE

## 2023-07-12 PROCEDURE — 99024 POSTOP FOLLOW-UP VISIT: CPT | Mod: ,,, | Performed by: ADVANCED PRACTICE MIDWIFE

## 2023-07-12 RX ORDER — IBUPROFEN 800 MG/1
800 TABLET ORAL EVERY 8 HOURS PRN
Qty: 30 TABLET | Refills: 0 | Status: SHIPPED | OUTPATIENT
Start: 2023-07-12 | End: 2023-08-24 | Stop reason: SDUPTHER

## 2023-07-12 NOTE — PROGRESS NOTES
CC: Postop visit     Martita Hilliard is a 47 y.o. female  post-op from a HYSTERECTOMY,TOTAL,LAPAROSCOPIC,WITH SALPINGO-OOPHORECTOMY on 23.  Patient is doing well postoperatively.  No pain.  No bowel or bladder complaints.  No fever.  C/O headache off and on and has only taken Tylenol for pain. States increased tylenol usage to manage pain and asks for Tylenol with Codeine.     The pathology revealed:  Uterus with bilateral tubes and ovaries, total hysterectomy:  - Benign uterine cervix  - Benign uterine corpus with benign endometrium and leiomyomata  - Benign ovaries  - Benign, previously ligated uterine tubes with benign paratubal cysts    Past Medical History:   Diagnosis Date    Bone disease     Degenerative    Chronic sinusitis, unspecified     Crohn's disease     History of herpes genitalis     Hypertension     Pancreatitis     Sciatica      Past Surgical History:   Procedure Laterality Date    CYSTOSCOPY N/A 2023    Procedure: CYSTOSCOPY;  Surgeon: Christian Jurado MD;  Location: [facility] OR;  Service: OB/GYN;  Laterality: N/A;    HYSTERECTOMY, TOTAL, LAPAROSCOPIC, WITH SALPINGO-OOPHORECTOMY Bilateral 2023    Procedure: HYSTERECTOMY,TOTAL,LAPAROSCOPIC,WITH SALPINGO-OOPHORECTOMY;  Surgeon: Christian Jurdao MD;  Location: [facility] OR;  Service: OB/GYN;  Laterality: Bilateral;    SINUS SURGERY      TONSILLECTOMY AND ADENOIDECTOMY      TUBAL LIGATION       Family History   Problem Relation Age of Onset    Hypertension Mother     Hypertension Child      Social History     Tobacco Use    Smoking status: Former     Types: Cigarettes     Quit date: 3/1/2023     Years since quittin.3    Smokeless tobacco: Never   Substance Use Topics    Alcohol use: Never    Drug use: Not Currently     Types: Marijuana     Comment: stopped 2 months ago     OB History    Para Term  AB Living   3         3   SAB IAB Ectopic Multiple Live Births                  # Outcome Date GA Lbr Aleksander/2nd  "Weight Sex Delivery Anes PTL Lv   3             2             1                 Temp 98.4 °F (36.9 °C) (Oral)   Resp 18   Ht 5' 3" (1.6 m)   Wt 74.7 kg (164 lb 9.6 oz)   LMP 2023 (Approximate)   SpO2 99%   BMI 29.16 kg/m²     ROS:  GENERAL: No fever, chills, fatigability or weight loss.  VULVAR: No pain, no lesions and no itching.  VAGINAL: No relaxation, no itching, no discharge, no abnormal bleeding and no lesions.  ABDOMEN: No abdominal pain. Denies nausea. Denies vomiting. No diarrhea. No constipation  BREAST: Denies pain. No lumps. No discharge.  URINARY: No incontinence, no nocturia, no frequency and no dysuria.  CARDIOVASCULAR: No chest pain. No shortness of breath. No leg cramps.  NEUROLOGICAL: No headaches. No vision changes.    PE:   SKIN: Warm, dry to touch  ABDOMEN:  Soft, non-tender, non-distended. umbilical incision healing well without any redness, edema, ecchymosis, or drainage noted with edges well approximated  PELVIC: deferred.        ICD-10-CM ICD-9-CM    1. Status post laparoscopic hysterectomy  Z90.710 V88.01 ibuprofen (ADVIL,MOTRIN) 800 MG tablet      2. History of smoking  Z87.891 V15.82         Rx for ibuprofen for pain provided  No heavy lifting, no sex, continue pelvic rest  Nothing in vagina  Discussed menopausal symptoms and encouraged to not smoke if desires HRT- discussed HRT and smoking increases the risk of blood clots/embolus  Verbalized understanding to all instructions and information  Questions answered to desired level of satisfaction      Follow up in about 2 weeks (around 2023), or if symptoms worsen or fail to improve, for post op exam in 2 weeks with DR. Campbell.    Dinora Naidu DNP, CNM, WHNP-BC            "

## 2023-07-26 ENCOUNTER — OFFICE VISIT (OUTPATIENT)
Dept: OBSTETRICS AND GYNECOLOGY | Facility: CLINIC | Age: 48
End: 2023-07-26
Payer: COMMERCIAL

## 2023-07-26 VITALS
HEART RATE: 65 BPM | DIASTOLIC BLOOD PRESSURE: 92 MMHG | WEIGHT: 165 LBS | HEIGHT: 63 IN | BODY MASS INDEX: 29.23 KG/M2 | SYSTOLIC BLOOD PRESSURE: 154 MMHG

## 2023-07-26 DIAGNOSIS — Z90.710 STATUS POST LAPAROSCOPIC HYSTERECTOMY: Primary | ICD-10-CM

## 2023-07-26 DIAGNOSIS — I10 PRIMARY HYPERTENSION: ICD-10-CM

## 2023-07-26 PROCEDURE — 3080F DIAST BP >= 90 MM HG: CPT | Mod: CPTII,,, | Performed by: OBSTETRICS & GYNECOLOGY

## 2023-07-26 PROCEDURE — 1159F MED LIST DOCD IN RCRD: CPT | Mod: CPTII,,, | Performed by: OBSTETRICS & GYNECOLOGY

## 2023-07-26 PROCEDURE — 3008F PR BODY MASS INDEX (BMI) DOCUMENTED: ICD-10-PCS | Mod: CPTII,,, | Performed by: OBSTETRICS & GYNECOLOGY

## 2023-07-26 PROCEDURE — 3008F BODY MASS INDEX DOCD: CPT | Mod: CPTII,,, | Performed by: OBSTETRICS & GYNECOLOGY

## 2023-07-26 PROCEDURE — 3077F SYST BP >= 140 MM HG: CPT | Mod: CPTII,,, | Performed by: OBSTETRICS & GYNECOLOGY

## 2023-07-26 PROCEDURE — 99024 PR POST-OP FOLLOW-UP VISIT: ICD-10-PCS | Mod: ,,, | Performed by: OBSTETRICS & GYNECOLOGY

## 2023-07-26 PROCEDURE — 3080F PR MOST RECENT DIASTOLIC BLOOD PRESSURE >= 90 MM HG: ICD-10-PCS | Mod: CPTII,,, | Performed by: OBSTETRICS & GYNECOLOGY

## 2023-07-26 PROCEDURE — 3077F PR MOST RECENT SYSTOLIC BLOOD PRESSURE >= 140 MM HG: ICD-10-PCS | Mod: CPTII,,, | Performed by: OBSTETRICS & GYNECOLOGY

## 2023-07-26 PROCEDURE — 99024 POSTOP FOLLOW-UP VISIT: CPT | Mod: ,,, | Performed by: OBSTETRICS & GYNECOLOGY

## 2023-07-26 PROCEDURE — 1159F PR MEDICATION LIST DOCUMENTED IN MEDICAL RECORD: ICD-10-PCS | Mod: CPTII,,, | Performed by: OBSTETRICS & GYNECOLOGY

## 2023-07-26 NOTE — PROGRESS NOTES
Subjective:      Patient ID: Martita Hilliard is a 48 y.o. female.    Chief Complaint:  Post-op Evaluation (6 wk fu from 2023)      History of Present Illness  HPI  Postoperative Follow-up  Patient presents to the clinic 4 weeks status post total laparoscopic hysterectomy, bilateral oophorectomy, and bilateral salpingectomy for pelvic pain and menorrhagia. Eating a regular diet with difficulty. Bowel movements are normal. The patient is not having any pain.  Pt c/o headaches and elevated bp. Pt states that she has not been on BP medication in a while and does not have a PMD    GYN & OB History  Patient's last menstrual period was 2023 (exact date).   Date of Last Pap: No result found    OB History    Para Term  AB Living   3         3   SAB IAB Ectopic Multiple Live Births                  # Outcome Date GA Lbr [place]/2nd Weight Sex Delivery Anes PTL Lv   3             2             1                 Review of Systems  Review of Systems   Constitutional:  Negative for activity change, appetite change, fatigue and unexpected weight change.   HENT: Negative.     Respiratory:  Negative for cough, shortness of breath and wheezing.    Cardiovascular:  Negative for chest pain, palpitations and leg swelling.   Gastrointestinal:  Negative for abdominal pain, bloating, blood in stool, constipation, diarrhea, nausea, vomiting and reflux.   Genitourinary:  Negative for bladder incontinence, decreased libido, dysmenorrhea, dyspareunia, dysuria, flank pain, frequency, menorrhagia, menstrual problem, pelvic pain, urgency, vaginal bleeding, vaginal discharge, postcoital bleeding and vaginal odor.   Musculoskeletal:  Negative for back pain.   Integumentary:  Negative for rash, acne, hair changes, mole/lesion, breast mass, nipple discharge, breast skin changes and breast tenderness.   Neurological:  Positive for headaches.   Psychiatric/Behavioral:  Negative for depression and sleep  disturbance. The patient is not nervous/anxious.    Breast: Negative for asymmetry, breast self exam, lump, mass, nipple discharge, skin changes and tenderness       Objective:     Physical Exam:   Constitutional: She is oriented to person, place, and time. She appears well-developed and well-nourished.    HENT:   Head: Normocephalic and atraumatic.    Eyes: Pupils are equal, round, and reactive to light. Conjunctivae and EOM are normal.     Cardiovascular:  Normal rate, regular rhythm and normal heart sounds.             Pulmonary/Chest: Effort normal and breath sounds normal.        Abdominal: Soft. Bowel sounds are normal.     Genitourinary:    Vagina, right adnexa and left adnexa normal.      Pelvic exam was performed with patient supine.   Vaginal cuff normal.  Cervix is absent.Uterus is absent.           Musculoskeletal: Normal range of motion and moves all extremeties.       Neurological: She is alert and oriented to person, place, and time.    Skin: Skin is warm and dry.    Psychiatric: She has a normal mood and affect. Her behavior is normal. Thought content normal.       Assessment:     1. Status post laparoscopic hysterectomy    2. Primary hypertension               Plan:     Refer to Dr. Veras for BP management

## 2023-08-24 ENCOUNTER — OFFICE VISIT (OUTPATIENT)
Dept: FAMILY MEDICINE | Facility: CLINIC | Age: 48
End: 2023-08-24
Payer: COMMERCIAL

## 2023-08-24 VITALS
HEIGHT: 63 IN | TEMPERATURE: 98 F | DIASTOLIC BLOOD PRESSURE: 80 MMHG | HEART RATE: 73 BPM | OXYGEN SATURATION: 100 % | BODY MASS INDEX: 29.06 KG/M2 | WEIGHT: 164 LBS | SYSTOLIC BLOOD PRESSURE: 130 MMHG | RESPIRATION RATE: 18 BRPM

## 2023-08-24 DIAGNOSIS — J30.2 SEASONAL ALLERGIC RHINITIS, UNSPECIFIED TRIGGER: ICD-10-CM

## 2023-08-24 DIAGNOSIS — H66.90 OTITIS, UNSPECIFIED LATERALITY: Primary | ICD-10-CM

## 2023-08-24 DIAGNOSIS — M54.30 SCIATICA, UNSPECIFIED LATERALITY: ICD-10-CM

## 2023-08-24 PROCEDURE — 3079F PR MOST RECENT DIASTOLIC BLOOD PRESSURE 80-89 MM HG: ICD-10-PCS | Mod: CPTII,,, | Performed by: STUDENT IN AN ORGANIZED HEALTH CARE EDUCATION/TRAINING PROGRAM

## 2023-08-24 PROCEDURE — 3075F SYST BP GE 130 - 139MM HG: CPT | Mod: CPTII,,, | Performed by: STUDENT IN AN ORGANIZED HEALTH CARE EDUCATION/TRAINING PROGRAM

## 2023-08-24 PROCEDURE — 3075F PR MOST RECENT SYSTOLIC BLOOD PRESS GE 130-139MM HG: ICD-10-PCS | Mod: CPTII,,, | Performed by: STUDENT IN AN ORGANIZED HEALTH CARE EDUCATION/TRAINING PROGRAM

## 2023-08-24 PROCEDURE — 1159F MED LIST DOCD IN RCRD: CPT | Mod: CPTII,,, | Performed by: STUDENT IN AN ORGANIZED HEALTH CARE EDUCATION/TRAINING PROGRAM

## 2023-08-24 PROCEDURE — 3008F BODY MASS INDEX DOCD: CPT | Mod: CPTII,,, | Performed by: STUDENT IN AN ORGANIZED HEALTH CARE EDUCATION/TRAINING PROGRAM

## 2023-08-24 PROCEDURE — 3008F PR BODY MASS INDEX (BMI) DOCUMENTED: ICD-10-PCS | Mod: CPTII,,, | Performed by: STUDENT IN AN ORGANIZED HEALTH CARE EDUCATION/TRAINING PROGRAM

## 2023-08-24 PROCEDURE — 99214 PR OFFICE/OUTPT VISIT, EST, LEVL IV, 30-39 MIN: ICD-10-PCS | Mod: ,,, | Performed by: STUDENT IN AN ORGANIZED HEALTH CARE EDUCATION/TRAINING PROGRAM

## 2023-08-24 PROCEDURE — 3079F DIAST BP 80-89 MM HG: CPT | Mod: CPTII,,, | Performed by: STUDENT IN AN ORGANIZED HEALTH CARE EDUCATION/TRAINING PROGRAM

## 2023-08-24 PROCEDURE — 99214 OFFICE O/P EST MOD 30 MIN: CPT | Mod: ,,, | Performed by: STUDENT IN AN ORGANIZED HEALTH CARE EDUCATION/TRAINING PROGRAM

## 2023-08-24 PROCEDURE — 1159F PR MEDICATION LIST DOCUMENTED IN MEDICAL RECORD: ICD-10-PCS | Mod: CPTII,,, | Performed by: STUDENT IN AN ORGANIZED HEALTH CARE EDUCATION/TRAINING PROGRAM

## 2023-08-24 RX ORDER — FLUTICASONE PROPIONATE 50 MCG
1 SPRAY, SUSPENSION (ML) NASAL DAILY
Qty: 15.8 ML | Refills: 0 | Status: SHIPPED | OUTPATIENT
Start: 2023-08-24 | End: 2024-01-23

## 2023-08-24 RX ORDER — CIPROFLOXACIN AND DEXAMETHASONE 3; 1 MG/ML; MG/ML
4 SUSPENSION/ DROPS AURICULAR (OTIC) 2 TIMES DAILY
Qty: 7.5 ML | Refills: 0 | Status: SHIPPED | OUTPATIENT
Start: 2023-08-24 | End: 2023-08-25 | Stop reason: ALTCHOICE

## 2023-08-24 RX ORDER — IBUPROFEN 800 MG/1
800 TABLET ORAL EVERY 8 HOURS PRN
Qty: 30 TABLET | Refills: 0 | Status: SHIPPED | OUTPATIENT
Start: 2023-08-24 | End: 2024-01-23

## 2023-08-24 NOTE — PROGRESS NOTES
[facility]    Chief Complaint      Chief Complaint   Patient presents with    Otalgia     Keeps having reoccuring ear infection; states her left ear has a big painful knot on it states she was crying last night from the pain 9/10 pain rate. Ibuprofen & tylenol taken       History of Present Illness      Martita Hilliard is a 48 y.o. female with chronic conditions of chronic sinusitis, crohn's disease, obesity, sciatica who presents today for otalgia.  C/o having a not inside her ear, would like to try ear drops and will discuss potential referral to ENT when she comes in for her appointment to establish care on next week.    Bp elevated, wants to follow with Dr. West, states everything that she's tried in the past she was allergic to.     Past Medical History:  Past Medical History:   Diagnosis Date    Bone disease     Degenerative    Chronic sinusitis, unspecified     Crohn's disease     History of herpes genitalis     Hypertension     Pancreatitis     Sciatica        Past Surgical History:   has a past surgical history that includes Sinus surgery; Tonsillectomy and adenoidectomy; Tubal ligation; hysterectomy, total, laparoscopic, with salpingo-oophorectomy (Bilateral, 2023); and cystoscopy (N/A, 2023).    Social History:  Social History     Tobacco Use    Smoking status: Former     Current packs/day: 0.00     Types: Cigarettes     Quit date: 3/1/2023     Years since quittin.4     Passive exposure: Current    Smokeless tobacco: Never   Substance Use Topics    Alcohol use: Never    Drug use: Not Currently     Types: Marijuana     Comment: stopped 2 months ago       I personally reviewed all past medical, surgical, and social.     Review of Systems   Constitutional:  Negative for appetite change, chills, fatigue and fever.   HENT:  Positive for ear pain. Negative for nasal congestion, nosebleeds, postnasal drip, rhinorrhea, sinus pressure/congestion, sneezing, sore throat and voice change.     Eyes:  Negative for pain, discharge, redness and itching.   Respiratory:  Negative for shortness of breath.    Cardiovascular:  Negative for chest pain.   Gastrointestinal:  Negative for abdominal pain, diarrhea, nausea and vomiting.   Allergic/Immunologic: Negative for environmental allergies.   Neurological:  Negative for headaches.   All other systems reviewed and are negative.       Medications:  Outpatient Encounter Medications as of 8/24/2023   Medication Sig Dispense Refill    [DISCONTINUED] ibuprofen (ADVIL,MOTRIN) 800 MG tablet Take 1 tablet (800 mg total) by mouth every 8 (eight) hours as needed for Pain. 30 tablet 0    ascorbic acid, vitamin C, (VITAMIN C) 1000 MG tablet Take 1,000 mg by mouth once daily.      ciprofloxacin-dexAMETHasone 0.3-0.1% (CIPRODEX) 0.3-0.1 % DrpS Place 4 drops into the left ear 2 (two) times daily. for 7 days 7.5 mL 0    ergocalciferol (ERGOCALCIFEROL) 50,000 unit Cap Take 50,000 Units by mouth every 7 days.      erythromycin (ROMYCIN) ophthalmic ointment SMARTSIG:sparingly Left Eye Every 6 Hours      fluticasone propionate (FLONASE) 50 mcg/actuation nasal spray 1 spray (50 mcg total) by Each Nostril route once daily. 15.8 mL 0    HYDROcodone-acetaminophen (NORCO) 7.5-325 mg per tablet Take 1 tablet by mouth every 6 (six) hours as needed for Pain. (Patient not taking: Reported on 7/12/2023) 28 tablet 0    ibuprofen (ADVIL,MOTRIN) 800 MG tablet Take 1 tablet (800 mg total) by mouth every 8 (eight) hours as needed for Pain. 30 tablet 0    [DISCONTINUED] lisinopriL-hydrochlorothiazide (PRINZIDE,ZESTORETIC) 20-25 mg Tab Take 1 tablet by mouth once daily.       No facility-administered encounter medications on file as of 8/24/2023.       Allergies:  Review of patient's allergies indicates:   Allergen Reactions    Ace inhibitors Swelling    Ferrous sulfate Anaphylaxis    Penicillins     Rocephin [ceftriaxone]     Terazol 3 [terconazole] Other (See Comments)     Vaginal irritation and  "swollen       Health Maintenance:    There is no immunization history on file for this patient.   Health Maintenance   Topic Date Due    Hepatitis C Screening  Never done    Lipid Panel  Never done    TETANUS VACCINE  Never done    Mammogram  Never done    Colorectal Cancer Screening  Never done        Physical Exam      Vital Signs  Temp: 98.3 °F (36.8 °C)  Temp Source: Oral  Pulse: 73  Resp: 18  SpO2: 100 %  BP: 130/80  BP Location: Left arm  Patient Position: Sitting  Height and Weight  Height: 5' 3" (160 cm)  Weight: 74.4 kg (164 lb)  BSA (Calculated - sq m): 1.82 sq meters  BMI (Calculated): 29.1  Weight in (lb) to have BMI = 25: 140.8]    Physical Exam  Constitutional:       Appearance: Normal appearance. She is not ill-appearing.   HENT:      Left Ear: Tympanic membrane is erythematous.      Nose: No congestion or rhinorrhea.   Eyes:      Conjunctiva/sclera: Conjunctivae normal.   Cardiovascular:      Rate and Rhythm: Normal rate and regular rhythm.   Pulmonary:      Effort: Pulmonary effort is normal.      Breath sounds: Normal breath sounds.   Neurological:      Mental Status: She is alert.          Laboratory:  CBC:  Recent Labs   Lab 06/19/23  1628 06/26/23  1150 06/28/23  1533   WBC 11.61 H 8.67 16.83 H   RBC 4.26 3.99 L 4.16 L   Hemoglobin 9.8 L 9.3 L 9.9 L   Hematocrit 33.3 L 31.1 L 32.6 L   Platelet Count 449 H 376 378   MCV 78.2 L 77.9 L 78.4 L   MCH 23.0 L 23.3 L 23.8 L   MCHC 29.4 L 29.9 L 30.4 L     CMP:  Recent Labs   Lab 04/25/23  1502 06/19/23  1628 06/26/23  1150   Glucose 101 85 105   Calcium 9.7 9.1 9.0   Albumin 4.1 4.0 3.7   Total Protein 8.8 H 7.9 7.5   Sodium 133 L 139 139   Potassium 2.6 L 3.8 3.7   CO2 21 25 28   Chloride 95 L 108 H 106   BUN 11 6 L 4 L   Alk Phos 90 83 84   ALT 16 18 14   AST 24 16 18   Bilirubin, Total 0.7 0.3 0.3     LIPIDS:  Recent Labs   Lab 12/04/22  0250 05/09/23  1157   TSH 1.970 2.860     TSH:  Recent Labs   Lab 12/04/22  0250 05/09/23  1157   TSH 1.970 2.860 "     A1C:        Assessment/Plan     Martita Hilliard is a 48 y.o.female with:    1. Otitis, unspecified laterality  -     ciprofloxacin-dexAMETHasone 0.3-0.1% (CIPRODEX) 0.3-0.1 % DrpS; Place 4 drops into the left ear 2 (two) times daily. for 7 days  Dispense: 7.5 mL; Refill: 0    2. Sciatica, unspecified laterality  -     ibuprofen (ADVIL,MOTRIN) 800 MG tablet; Take 1 tablet (800 mg total) by mouth every 8 (eight) hours as needed for Pain.  Dispense: 30 tablet; Refill: 0    3. Seasonal allergic rhinitis, unspecified trigger  -     fluticasone propionate (FLONASE) 50 mcg/actuation nasal spray; 1 spray (50 mcg total) by Each Nostril route once daily.  Dispense: 15.8 mL; Refill: 0         Chronic conditions status updated as per HPI.  Other than changes above, cont current medications and maintain follow up with specialists.  Return to clinic as needed, as previously scheduled to establish care.    Patient Instructions   Monitor blood pressure at home, bring readings to established care appointment     If the doctor orders antibiotics, be sure to take all of them, even if you start to feel better.  Do not put anything in your ear unless it was ordered by the doctor.  You may want to take medicines like ibuprofen, naproxen, or acetaminophen to help with pain.     Nanci Lezama, FNP-BC  [facility]

## 2023-08-24 NOTE — PATIENT INSTRUCTIONS
Monitor blood pressure at home, bring readings to established care appointment     If the doctor orders antibiotics, be sure to take all of them, even if you start to feel better.  Do not put anything in your ear unless it was ordered by the doctor.  You may want to take medicines like ibuprofen, naproxen, or acetaminophen to help with pain.

## 2023-08-25 DIAGNOSIS — H66.90 OTITIS, UNSPECIFIED LATERALITY: Primary | ICD-10-CM

## 2023-08-25 RX ORDER — CIPROFLOXACIN 500 MG/1
500 TABLET ORAL EVERY 12 HOURS
Qty: 14 TABLET | Refills: 0 | Status: SHIPPED | OUTPATIENT
Start: 2023-08-25 | End: 2023-09-01

## 2023-08-29 ENCOUNTER — OFFICE VISIT (OUTPATIENT)
Dept: FAMILY MEDICINE | Facility: CLINIC | Age: 48
End: 2023-08-29
Payer: COMMERCIAL

## 2023-08-29 VITALS
TEMPERATURE: 98 F | RESPIRATION RATE: 16 BRPM | BODY MASS INDEX: 29.15 KG/M2 | HEART RATE: 75 BPM | WEIGHT: 164.5 LBS | OXYGEN SATURATION: 99 % | HEIGHT: 63 IN | SYSTOLIC BLOOD PRESSURE: 150 MMHG | DIASTOLIC BLOOD PRESSURE: 92 MMHG

## 2023-08-29 DIAGNOSIS — M54.31 SCIATICA OF RIGHT SIDE: ICD-10-CM

## 2023-08-29 DIAGNOSIS — Z20.822 CONTACT WITH AND (SUSPECTED) EXPOSURE TO COVID-19: ICD-10-CM

## 2023-08-29 DIAGNOSIS — J06.9 UPPER RESPIRATORY TRACT INFECTION, UNSPECIFIED TYPE: Primary | ICD-10-CM

## 2023-08-29 DIAGNOSIS — I10 HYPERTENSION, UNSPECIFIED TYPE: ICD-10-CM

## 2023-08-29 LAB
CTP QC/QA: YES
FLUAV AG NPH QL: NEGATIVE
FLUBV AG NPH QL: NEGATIVE
SARS-COV-2 AG RESP QL IA.RAPID: NEGATIVE

## 2023-08-29 PROCEDURE — 96372 PR INJECTION,THERAP/PROPH/DIAG2ST, IM OR SUBCUT: ICD-10-PCS | Mod: ,,, | Performed by: STUDENT IN AN ORGANIZED HEALTH CARE EDUCATION/TRAINING PROGRAM

## 2023-08-29 PROCEDURE — 87428 SARSCOV & INF VIR A&B AG IA: CPT | Mod: QW,,, | Performed by: STUDENT IN AN ORGANIZED HEALTH CARE EDUCATION/TRAINING PROGRAM

## 2023-08-29 PROCEDURE — 1159F PR MEDICATION LIST DOCUMENTED IN MEDICAL RECORD: ICD-10-PCS | Mod: CPTII,,, | Performed by: STUDENT IN AN ORGANIZED HEALTH CARE EDUCATION/TRAINING PROGRAM

## 2023-08-29 PROCEDURE — 3080F DIAST BP >= 90 MM HG: CPT | Mod: CPTII,,, | Performed by: STUDENT IN AN ORGANIZED HEALTH CARE EDUCATION/TRAINING PROGRAM

## 2023-08-29 PROCEDURE — 3008F BODY MASS INDEX DOCD: CPT | Mod: CPTII,,, | Performed by: STUDENT IN AN ORGANIZED HEALTH CARE EDUCATION/TRAINING PROGRAM

## 2023-08-29 PROCEDURE — 87428 POCT SARS-COV2 (COVID) WITH FLU ANTIGEN: ICD-10-PCS | Mod: QW,,, | Performed by: STUDENT IN AN ORGANIZED HEALTH CARE EDUCATION/TRAINING PROGRAM

## 2023-08-29 PROCEDURE — 3077F PR MOST RECENT SYSTOLIC BLOOD PRESSURE >= 140 MM HG: ICD-10-PCS | Mod: CPTII,,, | Performed by: STUDENT IN AN ORGANIZED HEALTH CARE EDUCATION/TRAINING PROGRAM

## 2023-08-29 PROCEDURE — 99214 OFFICE O/P EST MOD 30 MIN: CPT | Mod: 25,,, | Performed by: STUDENT IN AN ORGANIZED HEALTH CARE EDUCATION/TRAINING PROGRAM

## 2023-08-29 PROCEDURE — 3008F PR BODY MASS INDEX (BMI) DOCUMENTED: ICD-10-PCS | Mod: CPTII,,, | Performed by: STUDENT IN AN ORGANIZED HEALTH CARE EDUCATION/TRAINING PROGRAM

## 2023-08-29 PROCEDURE — 3080F PR MOST RECENT DIASTOLIC BLOOD PRESSURE >= 90 MM HG: ICD-10-PCS | Mod: CPTII,,, | Performed by: STUDENT IN AN ORGANIZED HEALTH CARE EDUCATION/TRAINING PROGRAM

## 2023-08-29 PROCEDURE — 1159F MED LIST DOCD IN RCRD: CPT | Mod: CPTII,,, | Performed by: STUDENT IN AN ORGANIZED HEALTH CARE EDUCATION/TRAINING PROGRAM

## 2023-08-29 PROCEDURE — 96372 THER/PROPH/DIAG INJ SC/IM: CPT | Mod: ,,, | Performed by: STUDENT IN AN ORGANIZED HEALTH CARE EDUCATION/TRAINING PROGRAM

## 2023-08-29 PROCEDURE — 3077F SYST BP >= 140 MM HG: CPT | Mod: CPTII,,, | Performed by: STUDENT IN AN ORGANIZED HEALTH CARE EDUCATION/TRAINING PROGRAM

## 2023-08-29 PROCEDURE — 99214 PR OFFICE/OUTPT VISIT, EST, LEVL IV, 30-39 MIN: ICD-10-PCS | Mod: 25,,, | Performed by: STUDENT IN AN ORGANIZED HEALTH CARE EDUCATION/TRAINING PROGRAM

## 2023-08-29 RX ORDER — CETIRIZINE HYDROCHLORIDE 10 MG/1
10 TABLET ORAL DAILY
Qty: 30 TABLET | Refills: 0 | Status: SHIPPED | OUTPATIENT
Start: 2023-08-29 | End: 2024-01-23

## 2023-08-29 RX ORDER — DEXAMETHASONE SODIUM PHOSPHATE 4 MG/ML
4 INJECTION, SOLUTION INTRA-ARTICULAR; INTRALESIONAL; INTRAMUSCULAR; INTRAVENOUS; SOFT TISSUE
Status: COMPLETED | OUTPATIENT
Start: 2023-08-29 | End: 2023-08-29

## 2023-08-29 RX ORDER — CETIRIZINE HYDROCHLORIDE, PSEUDOEPHEDRINE HYDROCHLORIDE 5; 120 MG/1; MG/1
1 TABLET, FILM COATED, EXTENDED RELEASE ORAL EVERY 12 HOURS PRN
Qty: 20 TABLET | Refills: 0 | Status: CANCELLED | OUTPATIENT
Start: 2023-08-29 | End: 2023-09-08

## 2023-08-29 RX ORDER — CETIRIZINE HYDROCHLORIDE 10 MG/1
10 TABLET ORAL NIGHTLY
Qty: 30 TABLET | Refills: 11 | Status: CANCELLED | OUTPATIENT
Start: 2023-08-29 | End: 2024-08-28

## 2023-08-29 RX ORDER — METHYLPREDNISOLONE ACETATE 40 MG/ML
40 INJECTION, SUSPENSION INTRA-ARTICULAR; INTRALESIONAL; INTRAMUSCULAR; SOFT TISSUE
Status: COMPLETED | OUTPATIENT
Start: 2023-08-29 | End: 2023-08-29

## 2023-08-29 RX ADMIN — METHYLPREDNISOLONE ACETATE 40 MG: 40 INJECTION, SUSPENSION INTRA-ARTICULAR; INTRALESIONAL; INTRAMUSCULAR; SOFT TISSUE at 09:08

## 2023-08-29 RX ADMIN — DEXAMETHASONE SODIUM PHOSPHATE 4 MG: 4 INJECTION, SOLUTION INTRA-ARTICULAR; INTRALESIONAL; INTRAMUSCULAR; INTRAVENOUS; SOFT TISSUE at 09:08

## 2023-08-29 NOTE — PROGRESS NOTES
[facility]    Chief Complaint      Chief Complaint   Patient presents with    Cough     Productive cough    Fever     102 fever for few days; started over weekend. Otc tylenol & ibuprofen taken    Generalized Body Aches    Nasal Congestion    Otalgia    Nausea    Documentation Only     Symps started a few days ago; got swabbed for covid the only day requesting to be swabbed again, states everyone in household is currently sick.    Dizziness    Headache       History of Present Illness      Martita Hilliard is a 48 y.o. female with chronic conditions of chronic sinusitis, htn, obesity, crohns who presents today for upper resp and flu like symptoms.  Was seen and treated a week ago for otitis but did not  her ear drops or oral antibiotics.  Blood pressure continues to be elevated, however, pt is adamant that she's allergic to several different blood pressure medications but cannot remember the names but states that her reactions to the different medications was severe and describes an anaphylactic type response to the medications. Advised pt that it is unsafe for me to blindly prescribe medications if she is unable to confirm which ones she's allergic to, however, also expressed the importance of getting her blood pressure managed. Pt states was referred to Dr. West and wants to establish care with her for management of her chronic conditions.       Endorses history of sciatic pain and is requesting a referral to pain management.       Past Medical History:  Past Medical History:   Diagnosis Date    Bone disease     Degenerative    Chronic sinusitis, unspecified     Crohn's disease     History of herpes genitalis     Hypertension     Pancreatitis     Sciatica        Past Surgical History:   has a past surgical history that includes Sinus surgery; Tonsillectomy and adenoidectomy; Tubal ligation; hysterectomy, total, laparoscopic, with salpingo-oophorectomy (Bilateral, 6/28/2023); and cystoscopy (N/A,  2023).    Social History:  Social History     Tobacco Use    Smoking status: Former     Current packs/day: 0.00     Types: Cigarettes     Quit date: 3/1/2023     Years since quittin.4     Passive exposure: Current    Smokeless tobacco: Never   Substance Use Topics    Alcohol use: Never    Drug use: Not Currently     Types: Marijuana     Comment: stopped 2 months ago       I personally reviewed all past medical, surgical, and social.     Review of Systems   Constitutional:  Positive for fever. Negative for appetite change, chills and fatigue.   HENT:  Positive for nasal congestion and ear pain. Negative for ear discharge, nosebleeds, postnasal drip, rhinorrhea, sinus pressure/congestion, sneezing, sore throat and voice change.    Eyes:  Positive for discharge. Negative for pain, redness and itching.   Respiratory:  Positive for cough (productive, yellow and green). Negative for shortness of breath and wheezing.    Cardiovascular:  Negative for chest pain.   Gastrointestinal:  Positive for nausea. Negative for abdominal pain, diarrhea and vomiting.   Musculoskeletal:  Positive for back pain and myalgias.   Allergic/Immunologic: Negative for environmental allergies.   Neurological:  Positive for dizziness and headaches.   All other systems reviewed and are negative.       Medications:  Outpatient Encounter Medications as of 2023   Medication Sig Dispense Refill    ascorbic acid, vitamin C, (VITAMIN C) 1000 MG tablet Take 1,000 mg by mouth once daily.      ciprofloxacin HCl (CIPRO) 500 MG tablet Take 1 tablet (500 mg total) by mouth every 12 (twelve) hours. for 7 days 14 tablet 0    ergocalciferol (ERGOCALCIFEROL) 50,000 unit Cap Take 50,000 Units by mouth every 7 days.      erythromycin (ROMYCIN) ophthalmic ointment SMARTSIG:sparingly Left Eye Every 6 Hours      fluticasone propionate (FLONASE) 50 mcg/actuation nasal spray 1 spray (50 mcg total) by Each Nostril route once daily. 15.8 mL 0    ibuprofen  "(ADVIL,MOTRIN) 800 MG tablet Take 1 tablet (800 mg total) by mouth every 8 (eight) hours as needed for Pain. 30 tablet 0    cetirizine (ZYRTEC) 10 MG tablet Take 1 tablet (10 mg total) by mouth once daily. 30 tablet 0    HYDROcodone-acetaminophen (NORCO) 7.5-325 mg per tablet Take 1 tablet by mouth every 6 (six) hours as needed for Pain. (Patient not taking: Reported on 2023) 28 tablet 0    [DISCONTINUED] ciprofloxacin-dexAMETHasone 0.3-0.1% (CIPRODEX) 0.3-0.1 % DrpS Place 4 drops into the left ear 2 (two) times daily. for 7 days 7.5 mL 0    [DISCONTINUED] ibuprofen (ADVIL,MOTRIN) 800 MG tablet Take 1 tablet (800 mg total) by mouth every 8 (eight) hours as needed for Pain. 30 tablet 0    [DISCONTINUED] lisinopriL-hydrochlorothiazide (PRINZIDE,ZESTORETIC) 20-25 mg Tab Take 1 tablet by mouth once daily.      [] dexAMETHasone injection 4 mg       [] methylPREDNISolone acetate injection 40 mg        No facility-administered encounter medications on file as of 2023.       Allergies:  Review of patient's allergies indicates:   Allergen Reactions    Ace inhibitors Swelling    Ferrous sulfate Anaphylaxis    Penicillins     Rocephin [ceftriaxone]     Terazol 3 [terconazole] Other (See Comments)     Vaginal irritation and swollen       Health Maintenance:    There is no immunization history on file for this patient.   Health Maintenance   Topic Date Due    Hepatitis C Screening  Never done    Lipid Panel  Never done    TETANUS VACCINE  Never done    Mammogram  Never done    Colorectal Cancer Screening  Never done        Physical Exam      Vital Signs  Temp: 98.1 °F (36.7 °C)  Temp Source: Oral  Pulse: 75  Resp: 16  SpO2: 99 %  BP: (!) 150/92  BP Location: Left arm  Patient Position: Sitting  Height and Weight  Height: 5' 3" (160 cm)  Weight: 74.6 kg (164 lb 8 oz)  BSA (Calculated - sq m): 1.82 sq meters  BMI (Calculated): 29.1  Weight in (lb) to have BMI = 25: 140.8]    Physical Exam  Vitals and " nursing note reviewed.   Constitutional:       General: She is not in acute distress.  HENT:      Head: Normocephalic and atraumatic.      Left Ear: Tympanic membrane is erythematous.      Nose: Congestion present. No rhinorrhea.      Mouth/Throat:      Pharynx: No oropharyngeal exudate or posterior oropharyngeal erythema.   Eyes:      Conjunctiva/sclera: Conjunctivae normal.   Cardiovascular:      Rate and Rhythm: Normal rate and regular rhythm.   Pulmonary:      Effort: Pulmonary effort is normal.      Breath sounds: Normal breath sounds.   Abdominal:      General: Bowel sounds are normal.      Palpations: Abdomen is soft.      Tenderness: There is no abdominal tenderness. There is no guarding.   Lymphadenopathy:      Cervical: No cervical adenopathy.   Skin:     General: Skin is warm and dry.      Findings: No rash.   Neurological:      General: No focal deficit present.      Mental Status: She is alert and oriented to person, place, and time.      Gait: Gait normal.   Psychiatric:         Mood and Affect: Mood normal.         Behavior: Behavior normal.         Thought Content: Thought content normal.         Judgment: Judgment normal.          Laboratory:  CBC:  Recent Labs   Lab 06/19/23  1628 06/26/23  1150 06/28/23  1533   WBC 11.61 H 8.67 16.83 H   RBC 4.26 3.99 L 4.16 L   Hemoglobin 9.8 L 9.3 L 9.9 L   Hematocrit 33.3 L 31.1 L 32.6 L   Platelet Count 449 H 376 378   MCV 78.2 L 77.9 L 78.4 L   MCH 23.0 L 23.3 L 23.8 L   MCHC 29.4 L 29.9 L 30.4 L     CMP:  Recent Labs   Lab 04/25/23  1502 06/19/23  1628 06/26/23  1150   Glucose 101 85 105   Calcium 9.7 9.1 9.0   Albumin 4.1 4.0 3.7   Total Protein 8.8 H 7.9 7.5   Sodium 133 L 139 139   Potassium 2.6 L 3.8 3.7   CO2 21 25 28   Chloride 95 L 108 H 106   BUN 11 6 L 4 L   Alk Phos 90 83 84   ALT 16 18 14   AST 24 16 18   Bilirubin, Total 0.7 0.3 0.3     LIPIDS:  Recent Labs   Lab 12/04/22  0250 05/09/23  1157   TSH 1.970 2.860     TSH:  Recent Labs   Lab  12/04/22  0250 05/09/23  1157   TSH 1.970 2.860     A1C:        Assessment/Plan     Martita Hilliard is a 48 y.o.female with:    1. Upper respiratory tract infection, unspecified type  -     cetirizine (ZYRTEC) 10 MG tablet; Take 1 tablet (10 mg total) by mouth once daily.  Dispense: 30 tablet; Refill: 0  -     dexAMETHasone injection 4 mg  -     methylPREDNISolone acetate injection 40 mg  - take previously prescribed antibiotics   - consider re-testing for covid in the next few days     2. Sciatica of right side  -     Ambulatory referral/consult to Pain Clinic; Future; Expected date: 09/05/2023    3. Contact with and (suspected) exposure to covid-19  -     POCT SARS-COV2 (COVID) with Flu Antigen    4. Hypertension, unspecified type       -  pt unsure of which bp meds she's allergic to, was advised a week ago to obtain records, check with previous pharmacies to see if she can find this information out.  Her old chart and records were requested during this visit.  Additionally, she was strongly encouraged on diet, exercise, and to also attempt to locate records/medication allergies list.  And strongly encouraged to make an earlier appt to establish care.        Chronic conditions status updated as per HPI.  Other than changes above, cont current medications and maintain follow up with specialists.  Return to clinic as needed.  Request an earlier appointment to establish care.       Patient Instructions   Complete previously prescribed antibiotics  Tylenol or motrin as needed for pain or fever  Adequate rest and hydration  Obtain records to notify clinic of blood pressure medications that you're allergic to so we can prescribe a medication to help you manage your blood pressure   Discuss a sooner appointment with the  to establish care      Nanci Lezama, [facility]

## 2023-08-29 NOTE — PATIENT INSTRUCTIONS
Complete previously prescribed antibiotics  Tylenol or motrin as needed for pain or fever  Adequate rest and hydration  Obtain records to notify clinic of blood pressure medications that you're allergic to so we can prescribe a medication to help you manage your blood pressure   Discuss a sooner appointment with the  to establish care

## 2023-08-29 NOTE — Clinical Note
Need old [facility] records to review allergies, pt is allergic to several different blood pressure medications and is unsure as to which ones she's allergic to

## 2023-09-05 ENCOUNTER — TELEPHONE (OUTPATIENT)
Dept: FAMILY MEDICINE | Facility: CLINIC | Age: 48
End: 2023-09-05
Payer: COMMERCIAL

## 2023-09-06 ENCOUNTER — PATIENT OUTREACH (OUTPATIENT)
Dept: ADMINISTRATIVE | Facility: HOSPITAL | Age: 48
End: 2023-09-06

## 2023-09-06 NOTE — PROGRESS NOTES
Health maintenance record review for population health care gaps    Sent old record of allergies to pcp as requested     Health Maintenance Due   Topic Date Due    Hepatitis C Screening  Never done    Lipid Panel  Never done    COVID-19 Vaccine (1) Never done    HIV Screening  Never done    TETANUS VACCINE  Never done    Mammogram  Never done    Hemoglobin A1c (Diabetic Prevention Screening)  Never done    Colorectal Cancer Screening  Never done    Influenza Vaccine (1) Never done

## 2023-09-15 ENCOUNTER — TELEPHONE (OUTPATIENT)
Dept: FAMILY MEDICINE | Facility: CLINIC | Age: 48
End: 2023-09-15
Payer: COMMERCIAL

## 2023-09-15 NOTE — TELEPHONE ENCOUNTER
----- Message from PIOTR Valladares sent at 9/15/2023  1:21 PM CDT -----  Regarding: RE: referral  I treated her with antibiotics, is she still having issues?  She was also supposed to make an appt to follow up with Dr. West, she wanted [facility] to be her pcp.   ----- Message -----  From: GINNA Lamb  Sent: 9/14/2023   1:44 PM CDT  To: PIOTR Valladares  Subject: referral                                         Patient is calling to check on a referral for ear, nose & throat dr. The referral center din not see this referral put in .I look at pt chart only referral was there [facility]..

## 2023-09-28 ENCOUNTER — TELEPHONE (OUTPATIENT)
Dept: OBSTETRICS AND GYNECOLOGY | Facility: CLINIC | Age: 48
End: 2023-09-28
Payer: COMMERCIAL

## 2023-09-28 NOTE — LETTER
September 28, 2023      [facility]  [address] [facility] [address]  Phone: [phone]  Fax: [phone]       Patient: Martita Hilliard   YOB: 1975  Date of Visit: 07/26/2023    To Whom It May Concern:    Kam Hilliard  was at [facility] on 07/26/2023. The patient may return to work/school on 08/10/2023 with no restrictions. Patient had surgery and will need to be off work starting on 06/26/2023 until 08/09/2023. The patient was unable to lift, pull or push greater then 10 pounds for 6 weeks while she was recovering. If you have any questions or concerns, or if I can be of further assistance, please do not hesitate to contact me.    Sincerely,    Kala Goldberg LPN

## 2023-10-27 ENCOUNTER — TELEPHONE (OUTPATIENT)
Dept: FAMILY MEDICINE | Facility: CLINIC | Age: 48
End: 2023-10-27
Payer: COMMERCIAL

## 2023-10-27 NOTE — TELEPHONE ENCOUNTER
Referred to phone number [phone] spoke with pt states bp remains elevated but unable to take bp meds due to allergies.  Needing prior records from [facility] been unable to obtain. States referral from gyn to dr. West for bp unable to schedule appointment. States saw Mag[facility] but does not want return till able to get records.   To check with medical records on monday

## 2023-11-24 ENCOUNTER — HOSPITAL ENCOUNTER (EMERGENCY)
Facility: HOSPITAL | Age: 48
Discharge: HOME | End: 2023-11-24
Attending: EMERGENCY MEDICINE
Payer: COMMERCIAL

## 2023-11-24 VITALS
SYSTOLIC BLOOD PRESSURE: 126 MMHG | DIASTOLIC BLOOD PRESSURE: 85 MMHG | HEART RATE: 82 BPM | RESPIRATION RATE: 18 BRPM | TEMPERATURE: 98 F | WEIGHT: 165 LBS | BODY MASS INDEX: 29.23 KG/M2 | HEIGHT: 63 IN | OXYGEN SATURATION: 99 %

## 2023-11-24 DIAGNOSIS — K04.7 DENTAL INFECTION: Primary | ICD-10-CM

## 2023-11-24 DIAGNOSIS — H00.011 HORDEOLUM EXTERNUM OF RIGHT UPPER EYELID: ICD-10-CM

## 2023-11-24 PROCEDURE — 63600175 PHARM REV CODE 636 W HCPCS: Performed by: EMERGENCY MEDICINE

## 2023-11-24 PROCEDURE — 99284 EMERGENCY DEPT VISIT MOD MDM: CPT | Mod: 25

## 2023-11-24 PROCEDURE — 99284 PR EMERGENCY DEPT VISIT,LEVEL IV: ICD-10-PCS | Mod: 25,,, | Performed by: EMERGENCY MEDICINE

## 2023-11-24 PROCEDURE — 41800 DRAINAGE OF GUM LESION: CPT

## 2023-11-24 PROCEDURE — 41800 DRAINAGE OF GUM LESION: CPT | Mod: ,,, | Performed by: EMERGENCY MEDICINE

## 2023-11-24 PROCEDURE — 96372 THER/PROPH/DIAG INJ SC/IM: CPT | Mod: 59 | Performed by: EMERGENCY MEDICINE

## 2023-11-24 PROCEDURE — 41800 PR DRAINAGE OF GUM LESION: ICD-10-PCS | Mod: ,,, | Performed by: EMERGENCY MEDICINE

## 2023-11-24 PROCEDURE — 99284 EMERGENCY DEPT VISIT MOD MDM: CPT | Mod: 25,,, | Performed by: EMERGENCY MEDICINE

## 2023-11-24 RX ORDER — CLINDAMYCIN HYDROCHLORIDE 300 MG/1
300 CAPSULE ORAL 3 TIMES DAILY
Qty: 30 CAPSULE | Refills: 0 | Status: SHIPPED | OUTPATIENT
Start: 2023-11-24 | End: 2024-01-23 | Stop reason: ALTCHOICE

## 2023-11-24 RX ORDER — HYDROCODONE BITARTRATE AND ACETAMINOPHEN 5; 325 MG/1; MG/1
1 TABLET ORAL EVERY 6 HOURS PRN
Qty: 12 TABLET | Refills: 0 | Status: SHIPPED | OUTPATIENT
Start: 2023-11-24 | End: 2024-01-23

## 2023-11-24 RX ORDER — KETOROLAC TROMETHAMINE 30 MG/ML
60 INJECTION, SOLUTION INTRAMUSCULAR; INTRAVENOUS
Status: COMPLETED | OUTPATIENT
Start: 2023-11-24 | End: 2023-11-24

## 2023-11-24 RX ADMIN — KETOROLAC TROMETHAMINE 60 MG: 30 INJECTION, SOLUTION INTRAMUSCULAR at 08:11

## 2023-11-24 NOTE — DISCHARGE INSTRUCTIONS
Use a warm compress to your in to your ear as well as to your face were you have the dental infection    Get started on the prescription clindamycin today    Follow-up with dentist and primary care provider

## 2023-11-24 NOTE — ED PROVIDER NOTES
Encounter Date: 2023       History     Chief Complaint   Patient presents with    Otalgia    Dental Pain     Patient complains of pain and swelling to the left jaw area over the past several days.  She is also had prior stuck in her right eyelid and also soreness to her left external auditory canal over the past several months.  Patient has seen a dentist previously says they had recommended dental extraction      Review of patient's allergies indicates:   Allergen Reactions    Ace inhibitors Swelling    Ferrous sulfate Anaphylaxis    Penicillins     Rocephin [ceftriaxone]     Terazol 3 [terconazole] Other (See Comments)     Vaginal irritation and swollen     Past Medical History:   Diagnosis Date    Bone disease     Degenerative    Chronic sinusitis, unspecified     Crohn's disease     History of herpes genitalis     Hypertension     Pancreatitis     Sciatica      Past Surgical History:   Procedure Laterality Date    CYSTOSCOPY N/A 2023    Procedure: CYSTOSCOPY;  Surgeon: Christian Jurado MD;  Location: [facility] [place];  Service: OB/GYN;  Laterality: N/A;    HYSTERECTOMY, TOTAL, LAPAROSCOPIC, WITH SALPINGO-OOPHORECTOMY Bilateral 2023    Procedure: HYSTERECTOMY,TOTAL,LAPAROSCOPIC,WITH SALPINGO-OOPHORECTOMY;  Surgeon: Christian Jurado MD;  Location: [facility] [place];  Service: OB/GYN;  Laterality: Bilateral;    SINUS SURGERY      TONSILLECTOMY AND ADENOIDECTOMY      TUBAL LIGATION       Family History   Problem Relation Age of Onset    Hypertension Mother     Hypertension Child      Social History     Tobacco Use    Smoking status: Former     Current packs/day: 0.00     Types: Cigarettes     Quit date: 3/1/2023     Years since quittin.7     Passive exposure: Current    Smokeless tobacco: Never   Substance Use Topics    Alcohol use: Never    Drug use: Not Currently     Types: Marijuana     Comment: stopped 2 months ago     Review of Systems   Constitutional:  Negative for fever.   HENT:  Negative  for sore throat.    Respiratory:  Negative for shortness of breath.    Cardiovascular:  Negative for chest pain.   Gastrointestinal:  Negative for nausea.   Genitourinary:  Negative for dysuria.   Musculoskeletal:  Negative for back pain.   Skin:  Negative for rash.   Neurological:  Negative for weakness.   Hematological:  Does not bruise/bleed easily.       Physical Exam     Initial Vitals [11/24/23 0710]   BP Pulse Resp Temp SpO2   126/85 82 18 98.4 °F (36.9 °C) 99 %      MAP       --         Physical Exam    Nursing note and vitals reviewed.  Constitutional: She appears well-developed and well-nourished.   HENT:   Head: Normocephalic and atraumatic.   Moderate swelling to the left side of the jaw/mandible inferior    Fluctuant palpable abscess along the buccal gum on the left mandibular   Eyes: EOM are normal. Pupils are equal, round, and reactive to light.   Neck: Neck supple. No thyromegaly present.   Normal range of motion.  Cardiovascular:  Normal rate, regular rhythm, normal heart sounds and intact distal pulses.           No murmur heard.  Pulmonary/Chest: Breath sounds normal. No respiratory distress. She has no wheezes.   Abdominal: Abdomen is soft. Bowel sounds are normal. She exhibits no distension. There is no abdominal tenderness.   Musculoskeletal:         General: No tenderness or edema. Normal range of motion.      Cervical back: Normal range of motion and neck supple.     Lymphadenopathy:     She has no cervical adenopathy.   Neurological: She is alert and oriented to person, place, and time. She has normal strength. No cranial nerve deficit or sensory deficit.   Skin: Skin is warm and dry. No rash noted.   Psychiatric: She has a normal mood and affect.         Medical Screening Exam   See Full Note    ED Course   I & D - Incision and Drainage    Date/Time: 11/24/2023 8:06 AM  Location procedure was performed: [facility] EMERGENCY DEPARTMENT    Performed by: Santana Dover MD  Authorized by:  Santana Dover MD  Pre-operative diagnosis: Dental abscess  Post-operative diagnosis: Dental abscess  Type: abscess  Scalpel size: 11  Incision type: single straight  Drainage: pus  Drainage amount: copious  Comments: Verbal consent minimally invasive procedure puncture incision 11 blade 3 mm incision with copious purulent drainage from the buccal gingiva/mucosa        Labs Reviewed - No data to display       Imaging Results    None          Medications   ketorolac injection 60 mg (has no administration in time range)     Medical Decision Making  [facility]    Patient presents for emergent evaluation of acute swelling of face that poses a threat to life and/or bodily function.    In the ED patient found to have acute dental infection.        Discharge MDM    Patient was managed in the ED with Toradol incision and drainage  The response to treatment was improved.    Patient was discharged in stable condition.  Detailed return precautions discussed.     Risk  Prescription drug management.                                   Clinical Impression:   Final diagnoses:  [K04.7] Dental infection (Primary)  [H00.011] Hordeolum externum of right upper eyelid        ED Disposition Condition    Discharge Stable          ED Prescriptions       Medication Sig Dispense Start Date End Date Auth. Provider    clindamycin (CLEOCIN) 300 MG capsule Take 1 capsule (300 mg total) by mouth 3 (three) times daily. 30 capsule 11/24/2023 -- Santana Dover MD          Follow-up Information       Follow up With Specialties Details Why Contact Info    Nanci Lezama FNP Family Medicine Schedule an appointment as soon as possible for a visit   [address] [place] [id number]  [phone]      [facility] - Emergency Department Emergency Medicine Go to  As needed, If symptoms worsen [address] [place] [id number]  [phone]             Santana Dover MD  11/24/23 2041

## 2023-11-24 NOTE — ED TRIAGE NOTES
Patient reports ear pain and dental abscess that has been causing pain for over 3 weeks.  Has been to immediate care but the issue is unresolved.

## 2024-01-23 ENCOUNTER — OFFICE VISIT (OUTPATIENT)
Dept: FAMILY MEDICINE | Facility: CLINIC | Age: 49
End: 2024-01-23
Payer: COMMERCIAL

## 2024-01-23 VITALS
RESPIRATION RATE: 18 BRPM | WEIGHT: 165 LBS | HEIGHT: 63 IN | HEART RATE: 73 BPM | BODY MASS INDEX: 29.23 KG/M2 | OXYGEN SATURATION: 96 % | DIASTOLIC BLOOD PRESSURE: 74 MMHG | SYSTOLIC BLOOD PRESSURE: 166 MMHG

## 2024-01-23 DIAGNOSIS — R51.9 HEADACHE ABOVE THE EYE REGION: ICD-10-CM

## 2024-01-23 DIAGNOSIS — R42 VERTIGO: ICD-10-CM

## 2024-01-23 DIAGNOSIS — H53.9 VISUAL DISTURBANCES: ICD-10-CM

## 2024-01-23 DIAGNOSIS — J01.01 ACUTE RECURRENT MAXILLARY SINUSITIS: ICD-10-CM

## 2024-01-23 DIAGNOSIS — I10 PRIMARY HYPERTENSION: Primary | ICD-10-CM

## 2024-01-23 PROCEDURE — 3078F DIAST BP <80 MM HG: CPT | Mod: CPTII,,, | Performed by: NURSE PRACTITIONER

## 2024-01-23 PROCEDURE — 1159F MED LIST DOCD IN RCRD: CPT | Mod: CPTII,,, | Performed by: NURSE PRACTITIONER

## 2024-01-23 PROCEDURE — 99214 OFFICE O/P EST MOD 30 MIN: CPT | Mod: ,,, | Performed by: NURSE PRACTITIONER

## 2024-01-23 PROCEDURE — 3008F BODY MASS INDEX DOCD: CPT | Mod: CPTII,,, | Performed by: NURSE PRACTITIONER

## 2024-01-23 PROCEDURE — 3077F SYST BP >= 140 MM HG: CPT | Mod: CPTII,,, | Performed by: NURSE PRACTITIONER

## 2024-01-23 RX ORDER — MECLIZINE HYDROCHLORIDE 25 MG/1
25 TABLET ORAL 3 TIMES DAILY PRN
Qty: 30 TABLET | Refills: 0 | Status: SHIPPED | OUTPATIENT
Start: 2024-01-23

## 2024-01-23 RX ORDER — DOXYCYCLINE HYCLATE 100 MG
100 TABLET ORAL EVERY 12 HOURS
Qty: 14 TABLET | Refills: 0 | Status: SHIPPED | OUTPATIENT
Start: 2024-01-23 | End: 2024-02-07

## 2024-01-23 NOTE — PROGRESS NOTES
"Subjective:       Patient ID: Martita Hilliard is a 48 y.o. female.    Vitals:  height is 5' 3" (1.6 m) and weight is 74.8 kg (165 lb). Her blood pressure is 166/74 (abnormal) and her pulse is 73. Her respiration is 18 and oxygen saturation is 96%.     Chief Complaint: Hypertension and Documentation Only (States that her bp has been up for two weeks is having blurry vision with headaches)    TD is a 47 y/o BF who presents today for complaints of headache and blurry vision that has worsened over the last 2 weeks.  She states that about a week ago she felt so fatigued and weak that she was unable to get out of the shower without assistance.  She also states that she had an episode of seeing only black as she was driving to the clinic in opted to continue with her plans to be evaluated clinic versus going to the emergency department.  She notes that she is allergic to multiple blood pressure medications and has not been prescribed any anti-hypertensive medications at this time. She also notes that she has seen [facility] Ro and they are attempting to obtain her medical records for accurate documentation of allergies.     Hypertension  This is a chronic problem. Episode onset: 2 weeks ago. The problem is uncontrolled. Associated symptoms include blurred vision and headaches. Past treatments include ACE inhibitors and diuretics.       Eyes:  Positive for double vision and blurred vision.   Musculoskeletal:  Positive for muscle ache.   Neurological:  Positive for headaches.         Objective:      Physical Exam  Vitals reviewed.   Constitutional:       Appearance: Normal appearance.   HENT:      Mouth/Throat:      Mouth: Mucous membranes are moist.   Eyes:      Conjunctiva/sclera: Conjunctivae normal.   Cardiovascular:      Rate and Rhythm: Normal rate and regular rhythm.      Heart sounds: No murmur heard.  Pulmonary:      Effort: Pulmonary effort is normal.      Breath sounds: Normal breath sounds.   Neurological:      " Mental Status: She is alert and oriented to person, place, and time.      Gait: Gait normal.   Psychiatric:         Mood and Affect: Mood normal.         Behavior: Behavior normal.         Thought Content: Thought content normal.         Judgment: Judgment normal.              Assessment:       1. Headache above the eye region    2. Vertigo    3. Acute recurrent maxillary sinusitis    4. Visual disturbances    5. Primary hypertension          X-Ray Sinuses 3 or more views  Narrative: EXAMINATION:  XR SINUSES MIN 3 VIEWS    CLINICAL HISTORY:  Headache, unspecified    TECHNIQUE:  Sinus series, three views:    COMPARISON:  None.    FINDINGS:  Mucosal thickening is present in both maxillary sinuses, right side greater than left.  There is leftward deviation of the nasal septum.  The remainder of the sinuses appear to be clear.  Impression: Bilateral maxillary sinusitis.    Place of service: [facility]    Electronically signed by: Shiloh Marquez  Date:    01/23/2024  Time:    12:00       Plan:       -Patient instructed to go to the ER if symptoms worsen.  Patient advised to follow up with PCP to discuss treatments options for hypertension  -Informed patient of risk for stroke and heart attack with uncontrolled hypertension  Headache above the eye region  -     X-Ray Sinuses 3 or more views; Future; Expected date: 01/23/2024  -     Ambulatory referral/consult to Neurology; Future; Expected date: 01/30/2024  -     CT Head Without Contrast; Future; Expected date: 01/23/2024    Vertigo  -     meclizine (ANTIVERT) 25 mg tablet; Take 1 tablet (25 mg total) by mouth 3 (three) times daily as needed for Dizziness.  Dispense: 30 tablet; Refill: 0  -     CT Head Without Contrast; Future; Expected date: 01/23/2024    Acute recurrent maxillary sinusitis  -     doxycycline (VIBRA-TABS) 100 MG tablet; Take 1 tablet (100 mg total) by mouth every 12 (twelve) hours.  Dispense: 14 tablet; Refill: 0    Visual disturbances  -      Ambulatory referral/consult to Ophthalmology; Future; Expected date: 01/30/2024    Primary hypertension  -     Ambulatory referral/consult to Ophthalmology; Future; Expected date: 01/30/2024      Follow up in 4 weeks (on 2/20/2024) for Hypertension with [facility].     Future Appointments   Date Time Provider Department Center   7/29/2024  2:00 PM Christian Jurado MD [facility] OBGYN [facility]      An After Visit Summary was printed and given to the patient.     Signature:    PIOTR Crocker  Family Nurse Practitioner  [facility]    Date of encounter: 1/23/24

## 2024-01-30 ENCOUNTER — TELEPHONE (OUTPATIENT)
Dept: FAMILY MEDICINE | Facility: CLINIC | Age: 49
End: 2024-01-30
Payer: COMMERCIAL

## 2024-02-01 ENCOUNTER — TELEPHONE (OUTPATIENT)
Dept: FAMILY MEDICINE | Facility: CLINIC | Age: 49
End: 2024-02-01

## 2024-02-01 NOTE — TELEPHONE ENCOUNTER
----- Message from Ethan Tan sent at 2/1/2024 12:12 PM CST -----  Regarding: RadMD needs more information  From [facility]:   We have received your request for Brain CT along with some clinical information. However, additional information is needed in the form of clinical records which support the medical necessity of these services to make a determination on this case.     Study Requested was: Brain CT     Please PROVIDE: Office notes with the problem you have.   2. Any prior image results for this problem.   3. Any lab or study results related to this problem.   4. Details of any treatments you have had for this problem.   5. Any reason why you could not have a Magnetic Resonance Imaging (MRI) study.     NOTE: I submitted a partial clinic note on 01/27/2024 and the completed note on 01/318/2024.  Also on 010/27/2024 I submitted the xray of sinus. No lab work was current to send.    If you would like to do a peer to peer please call: [phone]  use tracking number  [id number].    Please let me know the decision.  Thanks,  Aylin Tan

## 2024-02-07 ENCOUNTER — HOSPITAL ENCOUNTER (EMERGENCY)
Facility: HOSPITAL | Age: 49
Discharge: HOME | End: 2024-02-07
Payer: COMMERCIAL

## 2024-02-07 VITALS
SYSTOLIC BLOOD PRESSURE: 137 MMHG | RESPIRATION RATE: 16 BRPM | BODY MASS INDEX: 27.64 KG/M2 | HEIGHT: 63 IN | DIASTOLIC BLOOD PRESSURE: 78 MMHG | TEMPERATURE: 98 F | HEART RATE: 87 BPM | WEIGHT: 156 LBS | OXYGEN SATURATION: 98 %
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
## 2024-02-07 DIAGNOSIS — S02.2XXA CLOSED FRACTURE OF NASAL BONE, INITIAL ENCOUNTER: ICD-10-CM

## 2024-02-07 DIAGNOSIS — S09.90XA CLOSED HEAD INJURY, INITIAL ENCOUNTER: ICD-10-CM

## 2024-02-07 PROCEDURE — 99284 EMERGENCY DEPT VISIT MOD MDM: CPT | Mod: ,,, | Performed by: NURSE PRACTITIONER

## 2024-02-07 PROCEDURE — 25000003 PHARM REV CODE 250: Performed by: NURSE PRACTITIONER

## 2024-02-07 PROCEDURE — 99284 EMERGENCY DEPT VISIT MOD MDM: CPT | Mod: 25

## 2024-02-07 RX ORDER — HYDROCODONE BITARTRATE AND ACETAMINOPHEN 10; 325 MG/1; MG/1
1 TABLET ORAL
Status: COMPLETED | OUTPATIENT
Start: 2024-02-07 | End: 2024-02-07

## 2024-02-07 RX ORDER — CLINDAMYCIN HYDROCHLORIDE 150 MG/1
300 CAPSULE ORAL 4 TIMES DAILY
Qty: 56 CAPSULE | Refills: 0 | Status: SHIPPED | OUTPATIENT
Start: 2024-02-07 | End: 2024-02-14

## 2024-02-07 RX ORDER — HYDROCODONE BITARTRATE AND ACETAMINOPHEN 7.5; 325 MG/1; MG/1
1 TABLET ORAL EVERY 6 HOURS PRN
Qty: 12 TABLET | Refills: 0 | Status: SHIPPED | OUTPATIENT
Start: 2024-02-07

## 2024-02-07 RX ADMIN — HYDROCODONE BITARTRATE AND ACETAMINOPHEN 1 TABLET: 10; 325 TABLET ORAL at 11:02

## 2024-02-08 ENCOUNTER — TELEPHONE (OUTPATIENT)
Dept: EMERGENCY MEDICINE | Facility: HOSPITAL | Age: 49
End: 2024-02-08
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
## 2024-02-12 ENCOUNTER — TELEPHONE (OUTPATIENT)
Dept: EMERGENCY MEDICINE | Facility: HOSPITAL | Age: 49
End: 2024-02-12

## 2024-02-15 ENCOUNTER — OFFICE VISIT (OUTPATIENT)
Dept: OTOLARYNGOLOGY | Facility: CLINIC | Age: 49
End: 2024-02-15
Payer: COMMERCIAL

## 2024-02-15 VITALS — HEIGHT: 63 IN | WEIGHT: 156 LBS | BODY MASS INDEX: 27.64 KG/M2

## 2024-02-15 DIAGNOSIS — S02.2XXA CLOSED FRACTURE OF NASAL BONE, INITIAL ENCOUNTER: ICD-10-CM

## 2024-02-15 PROCEDURE — 1159F MED LIST DOCD IN RCRD: CPT | Mod: CPTII,,, | Performed by: OTOLARYNGOLOGY

## 2024-02-15 PROCEDURE — 99215 OFFICE O/P EST HI 40 MIN: CPT | Mod: PBBFAC | Performed by: OTOLARYNGOLOGY

## 2024-02-15 PROCEDURE — 3008F BODY MASS INDEX DOCD: CPT | Mod: CPTII,,, | Performed by: OTOLARYNGOLOGY

## 2024-02-15 PROCEDURE — 1160F RVW MEDS BY RX/DR IN RCRD: CPT | Mod: CPTII,,, | Performed by: OTOLARYNGOLOGY

## 2024-02-15 PROCEDURE — 99204 OFFICE O/P NEW MOD 45 MIN: CPT | Mod: S$PBB,,, | Performed by: OTOLARYNGOLOGY
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
## 2024-02-16 ENCOUNTER — TELEPHONE (OUTPATIENT)
Dept: OTOLARYNGOLOGY | Facility: CLINIC | Age: 49
End: 2024-02-16

## 2024-02-19 ENCOUNTER — TELEPHONE (OUTPATIENT)
Dept: FAMILY MEDICINE | Facility: CLINIC | Age: 49
End: 2024-02-19

## 2024-02-20 ENCOUNTER — ANESTHESIA (OUTPATIENT)
Dept: SURGERY | Facility: HOSPITAL | Age: 49
End: 2024-02-20
Payer: COMMERCIAL

## 2024-02-20 ENCOUNTER — ANESTHESIA EVENT (OUTPATIENT)
Dept: SURGERY | Facility: HOSPITAL | Age: 49
End: 2024-02-20
Payer: COMMERCIAL

## 2024-02-20 ENCOUNTER — HOSPITAL ENCOUNTER (OUTPATIENT)
Facility: HOSPITAL | Age: 49
Discharge: HOME | End: 2024-02-20
Attending: OTOLARYNGOLOGY | Admitting: OTOLARYNGOLOGY
Payer: COMMERCIAL

## 2024-02-20 VITALS
HEART RATE: 52 BPM | DIASTOLIC BLOOD PRESSURE: 95 MMHG | TEMPERATURE: 98 F | RESPIRATION RATE: 16 BRPM | OXYGEN SATURATION: 99 % | SYSTOLIC BLOOD PRESSURE: 157 MMHG

## 2024-02-20 DIAGNOSIS — S02.2XXA CLOSED FRACTURE OF NASAL BONE, INITIAL ENCOUNTER: Primary | ICD-10-CM

## 2024-02-20 DIAGNOSIS — S02.2XXA NASAL FRACTURE: ICD-10-CM

## 2024-02-20 PROCEDURE — 25000003 PHARM REV CODE 250: Performed by: NURSE ANESTHETIST, CERTIFIED REGISTERED

## 2024-02-20 PROCEDURE — 71000033 HC RECOVERY, INTIAL HOUR: Performed by: OTOLARYNGOLOGY

## 2024-02-20 PROCEDURE — 25000003 PHARM REV CODE 250: Performed by: OTOLARYNGOLOGY

## 2024-02-20 PROCEDURE — 63600175 PHARM REV CODE 636 W HCPCS: Performed by: NURSE ANESTHETIST, CERTIFIED REGISTERED

## 2024-02-20 PROCEDURE — 36000704 HC OR TIME LEV I 1ST 15 MIN: Performed by: OTOLARYNGOLOGY

## 2024-02-20 PROCEDURE — 21320 CLSD TX NSL FX W/MNPJ&STABLJ: CPT | Mod: ,,, | Performed by: OTOLARYNGOLOGY

## 2024-02-20 PROCEDURE — 37000008 HC ANESTHESIA 1ST 15 MINUTES: Performed by: OTOLARYNGOLOGY

## 2024-02-20 PROCEDURE — D9220A PRA ANESTHESIA: Mod: ,,, | Performed by: ANESTHESIOLOGY

## 2024-02-20 PROCEDURE — 71000015 HC POSTOP RECOV 1ST HR: Performed by: OTOLARYNGOLOGY

## 2024-02-20 RX ORDER — FENTANYL CITRATE 50 UG/ML
INJECTION, SOLUTION INTRAMUSCULAR; INTRAVENOUS
Status: DISCONTINUED | OUTPATIENT
Start: 2024-02-20 | End: 2024-02-20

## 2024-02-20 RX ORDER — LIDOCAINE HYDROCHLORIDE 20 MG/ML
INJECTION, SOLUTION EPIDURAL; INFILTRATION; INTRACAUDAL; PERINEURAL
Status: DISCONTINUED | OUTPATIENT
Start: 2024-02-20 | End: 2024-02-20

## 2024-02-20 RX ORDER — DIPHENHYDRAMINE HYDROCHLORIDE 50 MG/ML
25 INJECTION INTRAMUSCULAR; INTRAVENOUS EVERY 6 HOURS PRN
Status: DISCONTINUED | OUTPATIENT
Start: 2024-02-20 | End: 2024-02-20 | Stop reason: HOSPADM

## 2024-02-20 RX ORDER — ONDANSETRON HYDROCHLORIDE 2 MG/ML
INJECTION, SOLUTION INTRAVENOUS
Status: DISCONTINUED | OUTPATIENT
Start: 2024-02-20 | End: 2024-02-20

## 2024-02-20 RX ORDER — HYDROCODONE BITARTRATE AND ACETAMINOPHEN 7.5; 325 MG/1; MG/1
1 TABLET ORAL EVERY 6 HOURS PRN
Status: DISCONTINUED | OUTPATIENT
Start: 2024-02-20 | End: 2024-02-20 | Stop reason: HOSPADM

## 2024-02-20 RX ORDER — DEXAMETHASONE SODIUM PHOSPHATE 4 MG/ML
INJECTION, SOLUTION INTRA-ARTICULAR; INTRALESIONAL; INTRAMUSCULAR; INTRAVENOUS; SOFT TISSUE
Status: DISCONTINUED | OUTPATIENT
Start: 2024-02-20 | End: 2024-02-20

## 2024-02-20 RX ORDER — ONDANSETRON HYDROCHLORIDE 2 MG/ML
4 INJECTION, SOLUTION INTRAVENOUS DAILY PRN
Status: DISCONTINUED | OUTPATIENT
Start: 2024-02-20 | End: 2024-02-20 | Stop reason: HOSPADM

## 2024-02-20 RX ORDER — HYDROMORPHONE HYDROCHLORIDE 2 MG/ML
0.5 INJECTION, SOLUTION INTRAMUSCULAR; INTRAVENOUS; SUBCUTANEOUS EVERY 5 MIN PRN
Status: DISCONTINUED | OUTPATIENT
Start: 2024-02-20 | End: 2024-02-20 | Stop reason: HOSPADM

## 2024-02-20 RX ORDER — MORPHINE SULFATE 10 MG/ML
4 INJECTION INTRAMUSCULAR; INTRAVENOUS; SUBCUTANEOUS EVERY 5 MIN PRN
Status: DISCONTINUED | OUTPATIENT
Start: 2024-02-20 | End: 2024-02-20 | Stop reason: HOSPADM

## 2024-02-20 RX ORDER — SODIUM CHLORIDE 9 MG/ML
INJECTION, SOLUTION INTRAVENOUS CONTINUOUS
Status: DISCONTINUED | OUTPATIENT
Start: 2024-02-20 | End: 2024-02-20 | Stop reason: HOSPADM

## 2024-02-20 RX ORDER — MEPERIDINE HYDROCHLORIDE 25 MG/ML
25 INJECTION INTRAMUSCULAR; INTRAVENOUS; SUBCUTANEOUS EVERY 10 MIN PRN
Status: DISCONTINUED | OUTPATIENT
Start: 2024-02-20 | End: 2024-02-20 | Stop reason: HOSPADM

## 2024-02-20 RX ORDER — PROPOFOL 10 MG/ML
VIAL (ML) INTRAVENOUS
Status: DISCONTINUED | OUTPATIENT
Start: 2024-02-20 | End: 2024-02-20

## 2024-02-20 RX ADMIN — SODIUM CHLORIDE: 9 INJECTION, SOLUTION INTRAVENOUS at 08:02

## 2024-02-20 RX ADMIN — DEXAMETHASONE SODIUM PHOSPHATE 8 MG: 4 INJECTION, SOLUTION INTRA-ARTICULAR; INTRALESIONAL; INTRAMUSCULAR; INTRAVENOUS; SOFT TISSUE at 08:02

## 2024-02-20 RX ADMIN — LIDOCAINE HYDROCHLORIDE 80 MG: 20 INJECTION, SOLUTION INTRAVENOUS at 08:02

## 2024-02-20 RX ADMIN — ONDANSETRON 4 MG: 2 INJECTION INTRAMUSCULAR; INTRAVENOUS at 08:02

## 2024-02-20 RX ADMIN — SODIUM CHLORIDE: 9 INJECTION, SOLUTION INTRAVENOUS at 07:02

## 2024-02-20 RX ADMIN — FENTANYL CITRATE 50 MCG: 50 INJECTION INTRAMUSCULAR; INTRAVENOUS at 08:02

## 2024-02-20 RX ADMIN — PROPOFOL 150 MG: 10 INJECTION, EMULSION INTRAVENOUS at 08:02

## 2024-02-20 NOTE — BRIEF OP NOTE
[facility] - Orthopedic Periop Services  Brief Operative Note    Surgery Date: 2/20/2024     Surgeon(s) and Role:     * Karlos Gallegos MD - Primary    Assisting Surgeon: None    Pre-op Diagnosis:  Closed fracture of nasal bone, initial encounter [S02.2XXA]    Post-op Diagnosis:  Post-Op Diagnosis Codes:     * Closed fracture of nasal bone, initial encounter [S02.2XXA]    Procedure(s) (LRB):  CLOSED REDUCTION, FRACTURE, NASAL BONE (N/A)    Anesthesia: General    Operative Findings: Nasal fracture    Estimated Blood Loss: 0         Specimens:   Specimen (24h ago, onward)      None              Discharge Note    OUTCOME: Patient tolerated treatment/procedure well without complication and is now ready for discharge.    DISPOSITION: Home or Self Care    FINAL DIAGNOSIS: Nasal fracture  FOLLOWUP: In clinic    DISCHARGE INSTRUCTIONS:  No discharge procedures on file.

## 2024-02-20 NOTE — OP NOTE
Surgery Date: 2/20/2024     Surgeon(s) and Role:     * Karlos Gallegos MD - Primary    Assisting Surgeon: None    Pre-op Diagnosis:  Closed fracture of nasal bone, initial encounter [S02.2XXA]    Post-op Diagnosis:  Post-Op Diagnosis Codes:     * Closed fracture of nasal bone, initial encounter [S02.2XXA]    Procedure(s) (LRB):  CLOSED REDUCTION, FRACTURE, NASAL BONE (N/A)  After general anesthesia the nose was topically decongested with neosynephrine pledgets the alexandre elevator was used to elevate and return the nasal bones to the midline it was stabilized in the midline with steri strips the patient had no further bleeding they were reversed taken to RR in stable condition   Anesthesia: General    Operative Findings: Nasal fracture    Estimated Blood Loss: 0

## 2024-02-20 NOTE — TRANSFER OF CARE
Anesthesia Transfer of Care Note    Patient: Martita Hilliard    Procedure(s) Performed: Procedure(s) (LRB):  CLOSED REDUCTION, FRACTURE, NASAL BONE (N/A)    Patient location: PACU    Anesthesia Type: general    Transport from OR: Transported from OR on room air with adequate spontaneous ventilation    Post pain: adequate analgesia    Post assessment: no apparent anesthetic complications    Post vital signs: stable    Level of consciousness: responds to stimulation and awake    Nausea/Vomiting: no nausea/vomiting    Complications: none    Transfer of care protocol was followedComments: Good SV continue, NAD noted, VSS, RTRN      Last vitals: Visit Vitals  BP (!) 157/89   Pulse 62   Temp 36.6 °C (97.9 °F)   Resp 17   LMP 06/24/2023 (Exact Date)   SpO2 96%   Breastfeeding No      [Nl] : Neurological [Polydypsia] : no polydipsia [Polyuria] : no polyuria

## 2024-02-20 NOTE — ANESTHESIA PREPROCEDURE EVALUATION
02/20/2024  Martita Hilliard is a 48 y.o., female.      Pre-op Assessment    I have reviewed the Patient Summary Reports.    I have reviewed the NPO Status.   I have reviewed the Medications.     Review of Systems         Anesthesia Plan  Type of Anesthesia, risks & benefits discussed:    Anesthesia Type: Gen Natural Airway  Intra-op Monitoring Plan: Standard ASA Monitors  Induction:  IV  Informed Consent: Informed consent signed with the Patient and all parties understand the risks and agree with anesthesia plan.  All questions answered.   ASA Score: 2    Ready For Surgery From Anesthesia Perspective.     .  NPO greater than 8 hours  No anesthetic complications  Allergies      Ace Inhibitors Swelling High  12/4/2022      Ferrous Sulfate Anaphylaxis High  5/9/2023      Penicillins  High Allergy 4/7/2021      Rocephin [Ceftriaxone]  High Allergy 4/7/2021      Terazol 3 [Terconazole]         12/5/22 Echo: mild MR; EF 60%    HTN  Sciatica  H/o pancreatitis  Crohn's disease  H/o tubal ligation    Airway exam deferred (COVID precautions); adequate ROM at neck.

## 2024-02-20 NOTE — OR NURSING
0845 Rec'd pt to PACU asleep with oral airway in place. No signs of distress noted, respirations even and unlabored. VSS. Steri strips to nose C/D/I. No needs. Will continue to monitor.     0855 Oral airway removed, respirations even and unlabored. Reoriented to surroundings. No needs. Will continue to monitor.     0916 Out of PACU. VSS. No signs of bleeding/distress noted.     0920 Pt to ASC 12 awake and alert with no distress noted, respirations even and unlabored. No visitors at bedside. Bedside report given to SUZANNE Morrissey RN. Steri strips to nose C/D/I. Denies pain/needs. /85, P 55, R 16, O2 98% RA.

## 2024-02-21 NOTE — ANESTHESIA POSTPROCEDURE EVALUATION
Anesthesia Post Evaluation    Patient: Martita Hilliard    Procedure(s) Performed: Procedure(s) (LRB):  CLOSED REDUCTION, FRACTURE, NASAL BONE (N/A)    Final Anesthesia Type: general      Patient location during evaluation: PACU  Post-procedure vital signs: reviewed and stable  Pain management: adequate  Airway patency: patent    PONV status at discharge: No PONV  Anesthetic complications: no      Cardiovascular status: hemodynamically stable  Respiratory status: unassisted  Hydration status: euvolemic  Follow-up not needed.              Vitals Value Taken Time   /95 02/20/24 1001   Temp 36.6 °C (97.9 °F) 02/20/24 0846   Pulse 71 02/20/24 1001   Resp 16 02/20/24 1000   SpO2 99 % 02/20/24 1001   Vitals shown include unvalidated device data.      Event Time   Out of Recovery 09:16:00         Pain/Mikey Score: Mikey Score: 10 (2/20/2024 10:00 AM)

## 2024-02-29 ENCOUNTER — OFFICE VISIT (OUTPATIENT)
Dept: OTOLARYNGOLOGY | Facility: CLINIC | Age: 49
End: 2024-02-29
Payer: COMMERCIAL

## 2024-02-29 VITALS — BODY MASS INDEX: 27.64 KG/M2 | HEIGHT: 63 IN | WEIGHT: 156 LBS

## 2024-02-29 DIAGNOSIS — J32.9 CHRONIC SINUSITIS, UNSPECIFIED LOCATION: ICD-10-CM

## 2024-02-29 DIAGNOSIS — S02.2XXA CLOSED FRACTURE OF NASAL BONE, INITIAL ENCOUNTER: Primary | ICD-10-CM

## 2024-02-29 PROCEDURE — 1159F MED LIST DOCD IN RCRD: CPT | Mod: CPTII,,, | Performed by: OTOLARYNGOLOGY

## 2024-02-29 PROCEDURE — 99213 OFFICE O/P EST LOW 20 MIN: CPT | Mod: PBBFAC | Performed by: OTOLARYNGOLOGY

## 2024-02-29 PROCEDURE — 3008F BODY MASS INDEX DOCD: CPT | Mod: CPTII,,, | Performed by: OTOLARYNGOLOGY

## 2024-02-29 PROCEDURE — 1160F RVW MEDS BY RX/DR IN RCRD: CPT | Mod: CPTII,,, | Performed by: OTOLARYNGOLOGY

## 2024-02-29 PROCEDURE — 99214 OFFICE O/P EST MOD 30 MIN: CPT | Mod: S$PBB,,, | Performed by: OTOLARYNGOLOGY

## 2024-02-29 NOTE — PROGRESS NOTES
Subjective:       Patient ID: Martita Hilliard is a 48 y.o. female.    Chief Complaint: Follow-up (Post-op nasal fracture.) and Sinusitis (Patient complaining of her sinus draining. States she is taking currently taking an antibiotic.)    Follow-up  Associated symptoms include congestion.   Sinusitis  Associated symptoms include congestion, ear pain and sinus pressure.     Review of Systems   HENT:  Positive for congestion, ear pain, rhinorrhea, sinus pressure and sinus pain.    All other systems reviewed and are negative.      Objective:      Physical Exam  General: NAD  Head: Normocephalic, atraumatic, no facial asymmetry/normal strength,  Ears: Both auricules normal in appearance, w/o deformities tympanic membranes normal external auditory canals normal  Nose: External nose w/o deformities swollen turbinates no drainage or inflammation  Oral Cavity: Lips, gums, floor of mouth, tongue hard palate, and buccal mucosa without mass/lesion  Oropharynx: Mucosa pink and moist, soft palate, posterior pharynx and oropharyngeal wall without mass/lesion  Neck: Supple, symmetric, trachea midline, no palpable mass/lesion, no palpable cervical lymphadenopathy  Skin: Warm and dry, no concerning lesions  Respiratory: Respirations even, unlabored  Assessment:       1. Closed fracture of nasal bone, initial encounter    2. Chronic sinusitis, unspecified location      Healing well   Plan:       New issue is recurrent sinusitis that will not clear up over several months with antibiotics and other meds   CT scan of sinus   F/u after scan

## 2024-03-04 ENCOUNTER — OFFICE VISIT (OUTPATIENT)
Dept: NEUROLOGY | Facility: CLINIC | Age: 49
End: 2024-03-04
Payer: COMMERCIAL

## 2024-03-04 VITALS
WEIGHT: 156 LBS | OXYGEN SATURATION: 99 % | BODY MASS INDEX: 27.64 KG/M2 | DIASTOLIC BLOOD PRESSURE: 70 MMHG | HEIGHT: 63 IN | HEART RATE: 75 BPM | RESPIRATION RATE: 18 BRPM | SYSTOLIC BLOOD PRESSURE: 130 MMHG

## 2024-03-04 DIAGNOSIS — S02.2XXA CLOSED FRACTURE OF NASAL BONE, INITIAL ENCOUNTER: ICD-10-CM

## 2024-03-04 DIAGNOSIS — H81.10 BENIGN PAROXYSMAL POSITIONAL VERTIGO, UNSPECIFIED LATERALITY: Primary | ICD-10-CM

## 2024-03-04 DIAGNOSIS — R51.9 HEADACHE ABOVE THE EYE REGION: ICD-10-CM

## 2024-03-04 DIAGNOSIS — F07.81 POSTCONCUSSION SYNDROME: ICD-10-CM

## 2024-03-04 PROCEDURE — 3078F DIAST BP <80 MM HG: CPT | Mod: CPTII,,, | Performed by: PSYCHIATRY & NEUROLOGY

## 2024-03-04 PROCEDURE — 99215 OFFICE O/P EST HI 40 MIN: CPT | Mod: PBBFAC | Performed by: PSYCHIATRY & NEUROLOGY

## 2024-03-04 PROCEDURE — 99204 OFFICE O/P NEW MOD 45 MIN: CPT | Mod: S$PBB,,, | Performed by: PSYCHIATRY & NEUROLOGY

## 2024-03-04 PROCEDURE — 3008F BODY MASS INDEX DOCD: CPT | Mod: CPTII,,, | Performed by: PSYCHIATRY & NEUROLOGY

## 2024-03-04 PROCEDURE — 1159F MED LIST DOCD IN RCRD: CPT | Mod: CPTII,,, | Performed by: PSYCHIATRY & NEUROLOGY

## 2024-03-04 PROCEDURE — 3075F SYST BP GE 130 - 139MM HG: CPT | Mod: CPTII,,, | Performed by: PSYCHIATRY & NEUROLOGY

## 2024-03-04 RX ORDER — AMITRIPTYLINE HYDROCHLORIDE 25 MG/1
25 TABLET, FILM COATED ORAL NIGHTLY
Qty: 90 TABLET | Refills: 3 | Status: SHIPPED | OUTPATIENT
Start: 2024-03-04 | End: 2025-03-04
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
## 2024-03-11 ENCOUNTER — HOSPITAL ENCOUNTER (OUTPATIENT)
Dept: RADIOLOGY | Facility: HOSPITAL | Age: 49
Discharge: HOME | End: 2024-03-11
Attending: OTOLARYNGOLOGY

## 2024-03-11 ENCOUNTER — OFFICE VISIT (OUTPATIENT)
Dept: OTOLARYNGOLOGY | Facility: CLINIC | Age: 49
End: 2024-03-11
Payer: COMMERCIAL

## 2024-03-11 VITALS — HEIGHT: 63 IN | BODY MASS INDEX: 27.64 KG/M2 | WEIGHT: 156 LBS

## 2024-03-11 DIAGNOSIS — J32.9 CHRONIC SINUSITIS, UNSPECIFIED LOCATION: ICD-10-CM

## 2024-03-11 DIAGNOSIS — J32.9 CHRONIC SINUSITIS, UNSPECIFIED LOCATION: Primary | ICD-10-CM

## 2024-03-11 DIAGNOSIS — J31.0 OTHER RHINITIS: ICD-10-CM

## 2024-03-11 PROCEDURE — 70486 CT MAXILLOFACIAL W/O DYE: CPT | Mod: 26,,, | Performed by: RADIOLOGY

## 2024-03-11 PROCEDURE — 3008F BODY MASS INDEX DOCD: CPT | Mod: CPTII,,, | Performed by: OTOLARYNGOLOGY

## 2024-03-11 PROCEDURE — 70486 CT MAXILLOFACIAL W/O DYE: CPT | Mod: TC

## 2024-03-11 PROCEDURE — 99213 OFFICE O/P EST LOW 20 MIN: CPT | Mod: PBBFAC,25 | Performed by: OTOLARYNGOLOGY

## 2024-03-11 PROCEDURE — 99214 OFFICE O/P EST MOD 30 MIN: CPT | Mod: S$PBB,,, | Performed by: OTOLARYNGOLOGY

## 2024-03-11 PROCEDURE — 1159F MED LIST DOCD IN RCRD: CPT | Mod: CPTII,,, | Performed by: OTOLARYNGOLOGY

## 2024-03-11 PROCEDURE — 1160F RVW MEDS BY RX/DR IN RCRD: CPT | Mod: CPTII,,, | Performed by: OTOLARYNGOLOGY

## 2024-03-11 NOTE — PROGRESS NOTES
Subjective:       Patient ID: Martita Hilliard is a 48 y.o. female.    Chief Complaint: Follow-up (Patient following up for CT results.)    Follow-up  Associated symptoms include congestion.     Review of Systems   HENT:  Positive for congestion, ear pain, postnasal drip and rhinorrhea.    All other systems reviewed and are negative.      Objective:      Physical Exam  General: NAD  Head: Normocephalic, atraumatic, no facial asymmetry/normal strength,  Ears: Both auricules normal in appearance, w/o deformities tympanic membranes normal external auditory canals normal  Nose: External nose w/o deformities normal turbinates no drainage or inflammation  Oral Cavity: Lips, gums, floor of mouth, tongue hard palate, and buccal mucosa without mass/lesion  Oropharynx: Mucosa pink and moist, soft palate, posterior pharynx and oropharyngeal wall without mass/lesion  Neck: Supple, symmetric, trachea midline, no palpable mass/lesion, no palpable cervical lymphadenopathy  Skin: Warm and dry, no concerning lesions  Respiratory: Respirations even, unlabored  Assessment:       1. Chronic sinusitis, unspecified location    2. Other rhinitis        Plan:       RAST for allergies has taken shots before   CT today reviewed and shown to pt negative

## 2024-03-19 ENCOUNTER — CLINICAL SUPPORT (OUTPATIENT)
Dept: REHABILITATION | Facility: HOSPITAL | Age: 49
End: 2024-03-19

## 2024-03-19 DIAGNOSIS — F07.81 POSTCONCUSSION SYNDROME: ICD-10-CM

## 2024-03-19 PROCEDURE — 97162 PT EVAL MOD COMPLEX 30 MIN: CPT
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
## 2024-04-02 ENCOUNTER — OFFICE VISIT (OUTPATIENT)
Dept: FAMILY MEDICINE | Facility: CLINIC | Age: 49
End: 2024-04-02

## 2024-04-02 VITALS
HEIGHT: 63 IN | OXYGEN SATURATION: 96 % | BODY MASS INDEX: 27.65 KG/M2 | DIASTOLIC BLOOD PRESSURE: 79 MMHG | SYSTOLIC BLOOD PRESSURE: 133 MMHG | WEIGHT: 156.06 LBS | HEART RATE: 79 BPM

## 2024-04-02 DIAGNOSIS — M54.31 SCIATICA OF RIGHT SIDE: ICD-10-CM

## 2024-04-02 DIAGNOSIS — K02.9 DENTAL CARIES: ICD-10-CM

## 2024-04-02 DIAGNOSIS — I10 HYPERTENSION, UNSPECIFIED TYPE: Primary | ICD-10-CM

## 2024-04-02 LAB
ANION GAP SERPL CALCULATED.3IONS-SCNC: 13 MMOL/L (ref 7–16)
BASOPHILS # BLD AUTO: 0.07 K/UL (ref 0–0.2)
BASOPHILS NFR BLD AUTO: 0.8 % (ref 0–1)
BUN SERPL-MCNC: 6 MG/DL (ref 7–18)
BUN/CREAT SERPL: 9 (ref 6–20)
CALCIUM SERPL-MCNC: 9.3 MG/DL (ref 8.5–10.1)
CHLORIDE SERPL-SCNC: 103 MMOL/L (ref 98–107)
CO2 SERPL-SCNC: 29 MMOL/L (ref 21–32)
CREAT SERPL-MCNC: 0.69 MG/DL (ref 0.55–1.02)
DIFFERENTIAL METHOD BLD: ABNORMAL
EGFR (NO RACE VARIABLE) (RUSH/TITUS): 107 ML/MIN/1.73M2
EOSINOPHIL # BLD AUTO: 0.11 K/UL (ref 0–0.5)
EOSINOPHIL NFR BLD AUTO: 1.3 % (ref 1–4)
ERYTHROCYTE [DISTWIDTH] IN BLOOD BY AUTOMATED COUNT: 16.7 % (ref 11.5–14.5)
GLUCOSE SERPL-MCNC: 62 MG/DL (ref 74–106)
HCT VFR BLD AUTO: 37.3 % (ref 38–47)
HGB BLD-MCNC: 11.1 G/DL (ref 12–16)
IMM GRANULOCYTES # BLD AUTO: 0.02 K/UL (ref 0–0.04)
IMM GRANULOCYTES NFR BLD: 0.2 % (ref 0–0.4)
LYMPHOCYTES # BLD AUTO: 2.4 K/UL (ref 1–4.8)
LYMPHOCYTES NFR BLD AUTO: 28.4 % (ref 27–41)
MCH RBC QN AUTO: 25.1 PG (ref 27–31)
MCHC RBC AUTO-ENTMCNC: 29.8 G/DL (ref 32–36)
MCV RBC AUTO: 84.2 FL (ref 80–96)
MONOCYTES # BLD AUTO: 0.49 K/UL (ref 0–0.8)
MONOCYTES NFR BLD AUTO: 5.8 % (ref 2–6)
MPC BLD CALC-MCNC: 10.6 FL (ref 9.4–12.4)
NEUTROPHILS # BLD AUTO: 5.35 K/UL (ref 1.8–7.7)
NEUTROPHILS NFR BLD AUTO: 63.5 % (ref 53–65)
NRBC # BLD AUTO: 0 X10E3/UL
NRBC, AUTO (.00): 0 %
PLATELET # BLD AUTO: 330 K/UL (ref 150–400)
POTASSIUM SERPL-SCNC: 4 MMOL/L (ref 3.5–5.1)
RBC # BLD AUTO: 4.43 M/UL (ref 4.2–5.4)
SODIUM SERPL-SCNC: 141 MMOL/L (ref 136–145)
WBC # BLD AUTO: 8.44 K/UL (ref 4.5–11)

## 2024-04-02 PROCEDURE — 80048 BASIC METABOLIC PNL TOTAL CA: CPT | Mod: ,,, | Performed by: CLINICAL MEDICAL LABORATORY

## 2024-04-02 PROCEDURE — 85025 COMPLETE CBC W/AUTO DIFF WBC: CPT | Mod: ,,, | Performed by: CLINICAL MEDICAL LABORATORY

## 2024-04-02 PROCEDURE — 1159F MED LIST DOCD IN RCRD: CPT | Mod: CPTII,,, | Performed by: STUDENT IN AN ORGANIZED HEALTH CARE EDUCATION/TRAINING PROGRAM

## 2024-04-02 PROCEDURE — 99214 OFFICE O/P EST MOD 30 MIN: CPT | Mod: ,,, | Performed by: STUDENT IN AN ORGANIZED HEALTH CARE EDUCATION/TRAINING PROGRAM

## 2024-04-02 RX ORDER — DOXYCYCLINE HYCLATE 100 MG
100 TABLET ORAL EVERY 12 HOURS
Qty: 14 TABLET | Refills: 0 | Status: SHIPPED | OUTPATIENT
Start: 2024-04-02 | End: 2024-04-09

## 2024-04-02 NOTE — PATIENT INSTRUCTIONS
We have placed a referral for you, if you do not hear from them or receive an appointment in the next 1-2 weeks, please contact the referral center at [phone].

## 2024-04-02 NOTE — PROGRESS NOTES
[facility] Medicine    Chief Complaint      Chief Complaint   Patient presents with    Documentation Only     Needs to be ref to pain management        History of Present Illness      Martita Hilliard is a 48 y.o. female with chronic conditions of htn, obesity, sciatica  who presents today for pain management referral, also with dental caries.      Past Medical History:  Past Medical History:   Diagnosis Date    Bone disease     Degenerative    Chronic sinusitis, unspecified     Crohn's disease     History of herpes genitalis     Hypertension     Pancreatitis     Sciatica        Past Surgical History:   has a past surgical history that includes Sinus surgery; Tonsillectomy and adenoidectomy; Tubal ligation; hysterectomy, total, laparoscopic, with salpingo-oophorectomy (Bilateral, 2023); cystoscopy (N/A, 2023); and Closed reduction of fracture of nasal bone (N/A, 2024).    Social History:  Social History     Tobacco Use    Smoking status: Former     Current packs/day: 0.00     Types: Cigarettes     Quit date: 3/1/2023     Years since quittin.1     Passive exposure: Current    Smokeless tobacco: Never   Substance Use Topics    Alcohol use: Never    Drug use: Not Currently     Types: Marijuana     Comment: stopped 2 months ago       I personally reviewed all past medical, surgical, and social.     Review of Systems   Constitutional:  Negative for fatigue and fever.   HENT:  Positive for dental problem. Negative for sore throat.    Respiratory:  Negative for shortness of breath.    Cardiovascular:  Negative for chest pain.   Gastrointestinal:  Negative for abdominal pain.   Genitourinary:  Negative for dysuria.   Musculoskeletal:  Positive for back pain.   Integumentary:  Negative for rash.   Neurological:  Negative for headaches.   All other systems reviewed and are negative.       Medications:  Outpatient Encounter Medications as of 2024   Medication Sig Dispense Refill    amitriptyline (ELAVIL)  "25 MG tablet Take 1 tablet (25 mg total) by mouth every evening. 90 tablet 3    erythromycin (ROMYCIN) ophthalmic ointment SMARTSIG:sparingly Left Eye Every 6 Hours      HYDROcodone-acetaminophen (NORCO) 7.5-325 mg per tablet Take 1 tablet by mouth every 6 (six) hours as needed for Pain. 12 tablet 0    meclizine (ANTIVERT) 25 mg tablet Take 1 tablet (25 mg total) by mouth 3 (three) times daily as needed for Dizziness. 30 tablet 0    doxycycline (VIBRA-TABS) 100 MG tablet Take 1 tablet (100 mg total) by mouth every 12 (twelve) hours. for 7 days 14 tablet 0    [DISCONTINUED] lisinopriL-hydrochlorothiazide (PRINZIDE,ZESTORETIC) 20-25 mg Tab Take 1 tablet by mouth once daily.       No facility-administered encounter medications on file as of 4/2/2024.       Allergies:  Review of patient's allergies indicates:   Allergen Reactions    Ace inhibitors Swelling    Ferrous sulfate Anaphylaxis    Penicillins     Rocephin [ceftriaxone]     Terazol 3 [terconazole] Other (See Comments)     Vaginal irritation and swollen       Health Maintenance:    There is no immunization history on file for this patient.   Health Maintenance   Topic Date Due    Hepatitis C Screening  Never done    Lipid Panel  Never done    TETANUS VACCINE  Never done    Mammogram  Never done    Colorectal Cancer Screening  Never done        Physical Exam      Vital Signs  Pulse: 79  SpO2: 96 %  BP: 133/79  Pain Score:   8  Pain Loc: Generalized  Height and Weight  Height: 5' 3" (160 cm)  Weight: 70.8 kg (156 lb 1.4 oz)  BSA (Calculated - sq m): 1.77 sq meters  BMI (Calculated): 27.7  Weight in (lb) to have BMI = 25: 140.8]    Physical Exam  Constitutional:       Appearance: Normal appearance.   HENT:      Mouth/Throat:      Dentition: Abnormal dentition. Dental tenderness and dental caries present.   Cardiovascular:      Rate and Rhythm: Normal rate and regular rhythm.   Pulmonary:      Effort: Pulmonary effort is normal.      Breath sounds: Normal breath " sounds.   Skin:     General: Skin is warm and dry.   Neurological:      General: No focal deficit present.      Mental Status: She is alert and oriented to person, place, and time. Mental status is at baseline.          Laboratory:  CBC:  Recent Labs   Lab 06/26/23  1150 06/28/23  1533 04/02/24  1055   WBC 8.67 16.83 H 8.44   RBC 3.99 L 4.16 L 4.43   Hemoglobin 9.3 L 9.9 L 11.1 L   Hematocrit 31.1 L 32.6 L 37.3 L   Platelet Count 376 378 330   MCV 77.9 L 78.4 L 84.2   MCH 23.3 L 23.8 L 25.1 L   MCHC 29.9 L 30.4 L 29.8 L     CMP:  Recent Labs   Lab 04/25/23  1502 06/19/23  1628 06/26/23  1150 04/02/24  1055   Glucose 101 85 105 62 L   Calcium 9.7 9.1 9.0 9.3   Albumin 4.1 4.0 3.7  --    Total Protein 8.8 H 7.9 7.5  --    Sodium 133 L 139 139 141   Potassium 2.6 L 3.8 3.7 4.0   CO2 21 25 28 29   Chloride 95 L 108 H 106 103   BUN 11 6 L 4 L 6 L   Alk Phos 90 83 84  --    ALT 16 18 14  --    AST 24 16 18  --    Bilirubin, Total 0.7 0.3 0.3  --      LIPIDS:  Recent Labs   Lab 12/04/22  0250 05/09/23  1157   TSH 1.970 2.860     TSH:  Recent Labs   Lab 12/04/22  0250 05/09/23  1157   TSH 1.970 2.860     A1C:        Assessment/Plan     Martita Hilliard is a 48 y.o.female with:    1. Hypertension, unspecified type  -     CBC Auto Differential; Future; Expected date: 04/02/2024  -     Basic Metabolic Panel; Future; Expected date: 04/02/2024    2. Sciatica of right side  -     Ambulatory referral/consult to Pain Clinic; Future; Expected date: 04/09/2024    3. Dental caries  -     doxycycline (VIBRA-TABS) 100 MG tablet; Take 1 tablet (100 mg total) by mouth every 12 (twelve) hours. for 7 days  Dispense: 14 tablet; Refill: 0         Chronic conditions status updated as per HPI.  Other than changes above, cont current medications and maintain follow up with specialists.  Return to clinic make an appointment with fasting labs.     Patient Instructions   We have placed a referral for you, if you do not hear from them or receive an  appointment in the next 1-2 weeks, please contact the referral center at [phone].      Nanci Lezama, FNP-[facility]

## 2024-04-04 DIAGNOSIS — J31.0 OTHER RHINITIS: Primary | ICD-10-CM

## 2024-05-06 ENCOUNTER — OFFICE VISIT (OUTPATIENT)
Dept: OTOLARYNGOLOGY | Facility: CLINIC | Age: 49
End: 2024-05-06

## 2024-05-06 VITALS — BODY MASS INDEX: 27.64 KG/M2 | WEIGHT: 156 LBS | HEIGHT: 63 IN

## 2024-05-06 DIAGNOSIS — J32.8 OTHER CHRONIC SINUSITIS: ICD-10-CM

## 2024-05-06 DIAGNOSIS — H60.313 DIFFUSE OTITIS EXTERNA OF BOTH EARS, UNSPECIFIED CHRONICITY: ICD-10-CM

## 2024-05-06 DIAGNOSIS — H65.23 BILATERAL CHRONIC SEROUS OTITIS MEDIA: Primary | ICD-10-CM

## 2024-05-06 PROCEDURE — 99214 OFFICE O/P EST MOD 30 MIN: CPT | Mod: S$PBB,,, | Performed by: OTOLARYNGOLOGY

## 2024-05-06 PROCEDURE — 99213 OFFICE O/P EST LOW 20 MIN: CPT | Mod: PBBFAC | Performed by: OTOLARYNGOLOGY

## 2024-05-06 RX ORDER — NEOMYCIN SULFATE, POLYMYXIN B SULFATE AND HYDROCORTISONE 10; 3.5; 1 MG/ML; MG/ML; [USP'U]/ML
3 SUSPENSION/ DROPS AURICULAR (OTIC) 2 TIMES DAILY
Qty: 10 ML | Refills: 0 | Status: SHIPPED | OUTPATIENT
Start: 2024-05-06

## 2024-05-06 RX ORDER — CLINDAMYCIN HYDROCHLORIDE 300 MG/1
300 CAPSULE ORAL 2 TIMES DAILY
Qty: 28 CAPSULE | Refills: 0 | Status: SHIPPED | OUTPATIENT
Start: 2024-05-06

## 2024-05-06 NOTE — PROGRESS NOTES
Subjective:       Patient ID: Martita Hilliard is a 48 y.o. female.    Chief Complaint: Otalgia (Patient complaining of her ears hurting.), Sinusitis (She complains of having sinus pressure and drainage.), Headache (Patient complains of having headaches. She is sensitive to light.), and Cough (Patient states she is coughing up phlegm. )    Otalgia   Associated symptoms include coughing and headaches.   Sinusitis  Associated symptoms include coughing, ear pain and headaches.   Headache   Associated symptoms include coughing and ear pain.   Cough  Associated symptoms include ear pain and headaches.     Review of Systems   HENT:  Positive for ear pain.    Respiratory:  Positive for cough.    Neurological:  Positive for headaches.   All other systems reviewed and are negative.      Objective:      Physical Exam  General: NAD  Head: Normocephalic, atraumatic, no facial asymmetry/normal strength,  Ears: Both auricules normal in appearance, w/o deformities tympanic membranes normal external auditory canals red   Nose: External nose w/o deformities congested turbinates no drainage or inflammation  Oral Cavity: Lips, gums, floor of mouth, tongue hard palate, and buccal mucosa without mass/lesion  Oropharynx: Mucosa pink and moist, soft palate, posterior pharynx and oropharyngeal wall without mass/lesion  Neck: Supple, symmetric, trachea midline, no palpable mass/lesion, no palpable cervical lymphadenopathy  Skin: Warm and dry, no concerning lesions  Respiratory: Respirations even, unlabored  Assessment:       1. Bilateral chronic serous otitis media    2. Diffuse otitis externa of both ears, unspecified chronicity    3. Other chronic sinusitis        Plan:       Cleocin CSP drops f/u 3 weeks

## 2024-07-15 ENCOUNTER — HOSPITAL ENCOUNTER (EMERGENCY)
Facility: HOSPITAL | Age: 49
Discharge: HOME | End: 2024-07-15
Payer: COMMERCIAL

## 2024-07-15 VITALS
RESPIRATION RATE: 18 BRPM | HEART RATE: 79 BPM | OXYGEN SATURATION: 100 % | HEIGHT: 63 IN | BODY MASS INDEX: 26.4 KG/M2 | WEIGHT: 149 LBS | TEMPERATURE: 99 F | DIASTOLIC BLOOD PRESSURE: 84 MMHG | SYSTOLIC BLOOD PRESSURE: 114 MMHG

## 2024-07-15 DIAGNOSIS — L03.317 CELLULITIS OF LEFT BUTTOCK: Primary | ICD-10-CM

## 2024-07-15 DIAGNOSIS — R07.9 CHEST PAIN: ICD-10-CM

## 2024-07-15 PROCEDURE — 93005 ELECTROCARDIOGRAM TRACING: CPT

## 2024-07-15 PROCEDURE — 93010 ELECTROCARDIOGRAM REPORT: CPT | Mod: ,,, | Performed by: HOSPITALIST

## 2024-07-15 PROCEDURE — 99283 EMERGENCY DEPT VISIT LOW MDM: CPT | Mod: 25

## 2024-07-15 RX ORDER — SULFAMETHOXAZOLE AND TRIMETHOPRIM 800; 160 MG/1; MG/1
1 TABLET ORAL 2 TIMES DAILY
Qty: 20 TABLET | Refills: 0 | Status: SHIPPED | OUTPATIENT
Start: 2024-07-15 | End: 2024-07-25

## 2024-07-15 NOTE — ED PROVIDER NOTES
Encounter Date: 7/15/2024       History     Chief Complaint   Patient presents with    Insect Bite    Chest Pain    Generalized Body Aches     Pt presents to ED via POV with c/o possibly spider bite on back side, chest pain, and hurting all over.      48-year-old female presents to the emergency department to be evaluated for pain to her left buttock.  She has also had some body aches and chills.  She thinks that an insect may have bitten her left buttock    The history is provided by the patient.   Insect Bite  This is a new problem. The current episode started more than 2 days ago. Pertinent negatives include no chest pain, no abdominal pain, no headaches and no shortness of breath.     Review of patient's allergies indicates:   Allergen Reactions    Ace inhibitors Swelling    Azithromycin Swelling    Ferrous sulfate Anaphylaxis    Penicillins     Rocephin [ceftriaxone]     Terazol 3 [terconazole] Other (See Comments)     Vaginal irritation and swollen     Past Medical History:   Diagnosis Date    Bone disease     Degenerative    Chronic sinusitis, unspecified     Crohn's disease     History of herpes genitalis     Hypertension     Pancreatitis     Sciatica      Past Surgical History:   Procedure Laterality Date    CLOSED REDUCTION OF FRACTURE OF NASAL BONE N/A 2/20/2024    Procedure: CLOSED REDUCTION, FRACTURE, NASAL BONE;  Surgeon: Karlos Gallegos MD;  Location: [facility] OR;  Service: ENT;  Laterality: N/A;    CYSTOSCOPY N/A 6/28/2023    Procedure: CYSTOSCOPY;  Surgeon: Christian Jurado MD;  Location: [facility] OR;  Service: OB/GYN;  Laterality: N/A;    HYSTERECTOMY, TOTAL, LAPAROSCOPIC, WITH SALPINGO-OOPHORECTOMY Bilateral 6/28/2023    Procedure: HYSTERECTOMY,TOTAL,LAPAROSCOPIC,WITH SALPINGO-OOPHORECTOMY;  Surgeon: Christian Jurado MD;  Location: [facility] OR;  Service: OB/GYN;  Laterality: Bilateral;    SINUS SURGERY      TONSILLECTOMY AND ADENOIDECTOMY      TUBAL LIGATION       Family History    Problem Relation Name Age of Onset    Hypertension Mother      Hypertension Child       Social History     Tobacco Use    Smoking status: Former     Current packs/day: 0.00     Types: Cigarettes     Quit date: 3/1/2023     Years since quittin.3     Passive exposure: Current    Smokeless tobacco: Never   Substance Use Topics    Alcohol use: Never    Drug use: Not Currently     Types: Marijuana     Comment: stopped 2 months ago     Review of Systems   Constitutional:  Positive for chills. Negative for fever.   Respiratory:  Negative for shortness of breath.    Cardiovascular:  Negative for chest pain.   Gastrointestinal:  Negative for abdominal pain.   Neurological:  Negative for headaches.   All other systems reviewed and are negative.      Physical Exam     Initial Vitals [07/15/24 1444]   BP Pulse Resp Temp SpO2   114/84 79 18 99.3 °F (37.4 °C) 100 %      MAP       --         Physical Exam    Vitals reviewed.  Constitutional: She appears well-developed and well-nourished.   Neck: Neck supple.   Cardiovascular:  Normal rate and regular rhythm.           Pulmonary/Chest: Breath sounds normal.   Abdominal: Abdomen is soft. Bowel sounds are normal. She exhibits no distension and no mass. There is no abdominal tenderness. There is no rebound and no guarding.   Musculoskeletal:         General: Normal range of motion.      Cervical back: Neck supple.     Neurological: She is alert and oriented to person, place, and time. She has normal strength. GCS score is 15. GCS eye subscore is 4. GCS verbal subscore is 5. GCS motor subscore is 6.   Skin: Skin is warm and dry. Capillary refill takes less than 2 seconds. There is erythema (There is a 2 cm tender indurated area to the inferior left buttock, does not extend into the rectum.).   Psychiatric: She has a normal mood and affect.         Medical Screening Exam   See Full Note    ED Course   Procedures  Labs Reviewed - No data to display       Imaging Results    None           Medications - No data to display  Medical Decision Making  48-year-old female presents to the emergency department to be evaluated for pain to her left buttock.  She has also had some body aches and chills.  She thinks that an insect may have bitten her left buttock  Diagnosis: Cellulitis of the left buttock  Prescribed Bactrim    Risk  Prescription drug management.                                      Clinical Impression:   Final diagnoses:  [L03.317] Cellulitis of left buttock (Primary)        ED Disposition Condition    Discharge Stable          ED Prescriptions       Medication Sig Dispense Start Date End Date Auth. Provider    sulfamethoxazole-trimethoprim 800-160mg (BACTRIM DS) 800-160 mg Tab Take 1 tablet by mouth 2 (two) times daily. for 10 days 20 tablet 7/15/2024 7/25/2024 Beryl Sanchez FNP          Follow-up Information    None          Beryl Sanchez FNP  07/15/24 1506

## 2024-07-16 LAB
OHS QRS DURATION: 74 MS
OHS QTC CALCULATION: 448 MS

## 2024-07-29 ENCOUNTER — OFFICE VISIT (OUTPATIENT)
Dept: OBSTETRICS AND GYNECOLOGY | Facility: CLINIC | Age: 49
End: 2024-07-29
Payer: COMMERCIAL

## 2024-07-29 VITALS
SYSTOLIC BLOOD PRESSURE: 117 MMHG | HEART RATE: 80 BPM | DIASTOLIC BLOOD PRESSURE: 66 MMHG | BODY MASS INDEX: 27.88 KG/M2 | WEIGHT: 157.38 LBS

## 2024-07-29 DIAGNOSIS — L05.91 PILONIDAL CYST: ICD-10-CM

## 2024-07-29 DIAGNOSIS — Z12.72 SPECIAL SCREENING FOR MALIGNANT NEOPLASMS, VAGINA: ICD-10-CM

## 2024-07-29 DIAGNOSIS — Z01.419 ROUTINE GYNECOLOGICAL EXAMINATION: Primary | ICD-10-CM

## 2024-07-29 DIAGNOSIS — Z72.51 HIGH RISK SEXUAL BEHAVIOR, UNSPECIFIED TYPE: ICD-10-CM

## 2024-07-29 DIAGNOSIS — Z11.4 ENCOUNTER FOR SCREENING FOR HIV: ICD-10-CM

## 2024-07-29 DIAGNOSIS — Z12.11 COLON CANCER SCREENING: ICD-10-CM

## 2024-07-29 DIAGNOSIS — Z11.3 SCREEN FOR STD (SEXUALLY TRANSMITTED DISEASE): ICD-10-CM

## 2024-07-29 DIAGNOSIS — Z12.39 ENCOUNTER FOR SCREENING FOR MALIGNANT NEOPLASM OF BREAST, UNSPECIFIED SCREENING MODALITY: ICD-10-CM

## 2024-07-29 DIAGNOSIS — N95.1 MENOPAUSAL SYMPTOMS: ICD-10-CM

## 2024-07-29 LAB
CANDIDA SPECIES: NEGATIVE
GARDNERELLA: POSITIVE
HAV IGM SER QL: NORMAL
HBV CORE IGM SER QL: NORMAL
HBV SURFACE AG SERPL QL IA: NORMAL
HCV AB SER QL: NORMAL
HIV 1+O+2 AB SERPL QL: NORMAL
SYPHILIS AB INTERPRETATION: NORMAL
TRICHOMONAS: NEGATIVE

## 2024-07-29 PROCEDURE — 36415 COLL VENOUS BLD VENIPUNCTURE: CPT | Mod: ,,, | Performed by: OBSTETRICS & GYNECOLOGY

## 2024-07-29 PROCEDURE — 80074 ACUTE HEPATITIS PANEL: CPT | Mod: ,,, | Performed by: CLINICAL MEDICAL LABORATORY

## 2024-07-29 PROCEDURE — 99396 PREV VISIT EST AGE 40-64: CPT | Mod: ,,, | Performed by: OBSTETRICS & GYNECOLOGY

## 2024-07-29 PROCEDURE — 3008F BODY MASS INDEX DOCD: CPT | Mod: CPTII,,, | Performed by: OBSTETRICS & GYNECOLOGY

## 2024-07-29 PROCEDURE — 86780 TREPONEMA PALLIDUM: CPT | Mod: ,,, | Performed by: CLINICAL MEDICAL LABORATORY

## 2024-07-29 PROCEDURE — 87591 N.GONORRHOEAE DNA AMP PROB: CPT | Mod: ,,, | Performed by: CLINICAL MEDICAL LABORATORY

## 2024-07-29 PROCEDURE — 3074F SYST BP LT 130 MM HG: CPT | Mod: CPTII,,, | Performed by: OBSTETRICS & GYNECOLOGY

## 2024-07-29 PROCEDURE — 88142 CYTOPATH C/V THIN LAYER: CPT | Mod: TC,GCY | Performed by: OBSTETRICS & GYNECOLOGY

## 2024-07-29 PROCEDURE — 87660 TRICHOMONAS VAGIN DIR PROBE: CPT | Mod: ,,, | Performed by: CLINICAL MEDICAL LABORATORY

## 2024-07-29 PROCEDURE — 3078F DIAST BP <80 MM HG: CPT | Mod: CPTII,,, | Performed by: OBSTETRICS & GYNECOLOGY

## 2024-07-29 PROCEDURE — 87491 CHLMYD TRACH DNA AMP PROBE: CPT | Mod: ,,, | Performed by: CLINICAL MEDICAL LABORATORY

## 2024-07-29 PROCEDURE — 87510 GARDNER VAG DNA DIR PROBE: CPT | Mod: ,,, | Performed by: CLINICAL MEDICAL LABORATORY

## 2024-07-29 PROCEDURE — 87389 HIV-1 AG W/HIV-1&-2 AB AG IA: CPT | Mod: ,,, | Performed by: CLINICAL MEDICAL LABORATORY

## 2024-07-29 PROCEDURE — 87480 CANDIDA DNA DIR PROBE: CPT | Mod: ,,, | Performed by: CLINICAL MEDICAL LABORATORY

## 2024-07-29 RX ORDER — SULFAMETHOXAZOLE AND TRIMETHOPRIM 800; 160 MG/1; MG/1
1 TABLET ORAL 2 TIMES DAILY
Qty: 14 TABLET | Refills: 0 | Status: SHIPPED | OUTPATIENT
Start: 2024-07-29 | End: 2024-08-05

## 2024-07-29 RX ORDER — PAROXETINE 7.5 MG/1
7.5 CAPSULE ORAL DAILY
Qty: 30 CAPSULE | Refills: 11 | Status: SHIPPED | OUTPATIENT
Start: 2024-07-29 | End: 2025-07-29

## 2024-07-29 NOTE — PROGRESS NOTES
CC: Well woman exam    Martita Hilliard is a 49 y.o. female  presents for well woman exam.    LMP: Patient's last menstrual period was 2023 (exact date)..    Last mammogram: unknown   Last Colonoscopy: N/A    Patient complains of irritation to her buttock area where she was diagnosed with cellulitis. Patient also complains of night sweats and vaginal dryness. Patient states she recently started back smoking cigarettes.    Past Medical History:   Diagnosis Date    Bone disease     Degenerative    Chronic sinusitis, unspecified     Crohn's disease     History of herpes genitalis     Hypertension     Pancreatitis     Sciatica      Past Surgical History:   Procedure Laterality Date    CLOSED REDUCTION OF FRACTURE OF NASAL BONE N/A 2024    Procedure: CLOSED REDUCTION, FRACTURE, NASAL BONE;  Surgeon: Karlos Gallegos MD;  Location: [facility] OR;  Service: ENT;  Laterality: N/A;    CYSTOSCOPY N/A 2023    Procedure: CYSTOSCOPY;  Surgeon: Christian Jurado MD;  Location: [facility] OR;  Service: OB/GYN;  Laterality: N/A;    HYSTERECTOMY, TOTAL, LAPAROSCOPIC, WITH SALPINGO-OOPHORECTOMY Bilateral 2023    Procedure: HYSTERECTOMY,TOTAL,LAPAROSCOPIC,WITH SALPINGO-OOPHORECTOMY;  Surgeon: Christian Jurado MD;  Location: [facility];  Service: OB/GYN;  Laterality: Bilateral;    SINUS SURGERY      TONSILLECTOMY AND ADENOIDECTOMY      TUBAL LIGATION       Social History     Socioeconomic History    Marital status: Single   Tobacco Use    Smoking status: Former     Current packs/day: 0.00     Types: Cigarettes     Quit date: 3/1/2023     Years since quittin.4     Passive exposure: Current    Smokeless tobacco: Never   Substance and Sexual Activity    Alcohol use: Never    Drug use: Not Currently     Types: Marijuana     Comment: stopped 2 months ago    Sexual activity: Not Currently     Partners: Male     Birth control/protection: None     Family History   Problem Relation Name Age of Onset     Hypertension Mother      Hypertension Child       OB History          3    Para        Term                AB        Living   3         SAB        IAB        Ectopic        Multiple        Live Births                     /66   Pulse 80   Wt 71.4 kg (157 lb 6.4 oz)   LMP 2023 (Exact Date)   BMI 27.88 kg/m²       ROS:  GENERAL: Denies weight gain or weight loss. Feeling well overall.   SKIN: Denies rash or lesions.   HEAD: Denies head injury or headache.   NODES: Denies enlarged lymph nodes.   CHEST: Denies chest pain or shortness of breath.   CARDIOVASCULAR: Denies palpitations or left sided chest pain.   ABDOMEN: No abdominal pain, constipation, diarrhea, nausea, vomiting or rectal bleeding.   URINARY: No frequency, dysuria, hematuria, or burning on urination.  REPRODUCTIVE: See HPI.   BREASTS: The patient performs breast self-examination and denies pain, lumps, or nipple discharge.   HEMATOLOGIC: No easy bruisability or excessive bleeding.   MUSCULOSKELETAL: Denies joint pain or swelling.   NEUROLOGIC: Denies syncope or weakness.   PSYCHIATRIC: Denies depression, anxiety or mood swings.    PHYSICAL EXAM:  APPEARANCE: Well nourished, well developed, in no acute distress.  AFFECT: WNL, alert and oriented x 3  SKIN: No acne or hirsutism  NECK: Neck symmetric without masses or thyromegaly  NODES: No inguinal, cervical, axillary, or femoral lymph node enlargement  CHEST: Good respiratory effect  ABDOMEN: Soft.  No tenderness or masses.  No hepatosplenomegaly.  No hernias.  BREASTS: Symmetrical, no skin changes or visible lesions.  No palpable masses, nipple discharge bilaterally.  PELVIC: Normal external genitalia without lesions.  Normal hair distribution.  Adequate perineal body, normal urethral meatus.  Vagina moist and well rugated without lesions or discharge.  Cervix  and uterus surgically absent. Adnexa without masses or tenderness.    EXTREMITIES: No edema.    Routine gynecological  examination  -     ThinPrep Pap Test; Future; Expected date: 07/29/2024  -     HPV DNA probe, amplified    Special screening for malignant neoplasms, vagina  -     ThinPrep Pap Test; Future; Expected date: 07/29/2024  -     HPV DNA probe, amplified    Screen for STD (sexually transmitted disease)    High risk sexual behavior, unspecified type  -     Bacterial Vaginosis; Future; Expected date: 07/29/2024  -     Chlamydia/GC, PCR; Future; Expected date: 07/29/2024  -     Treponema Pallidum (Syphillis) Antibody; Future; Expected date: 07/29/2024  -     HIV 1/2 Ag/Ab (4th Gen); Future; Expected date: 07/29/2024  -     Hepatitis Panel, Acute; Future; Expected date: 07/29/2024    Encounter for screening for HIV  -     HIV 1/2 Ag/Ab (4th Gen); Future; Expected date: 07/29/2024    Encounter for screening for malignant neoplasm of breast, unspecified screening modality  -     Mammo Digital Screening Bilat; Future; Expected date: 07/29/2024    Colon cancer screening  -     Colonoscopy; Future; Expected date: 07/29/2024    Pilonidal cyst    Menopausal symptoms  -     PARoxetine mesylate,menop.sym, (BRISDELLE) 7.5 mg Cap; Take 7.5 mg by mouth once daily.  Dispense: 30 capsule; Refill: 11    Other orders  -     sulfamethoxazole-trimethoprim 800-160mg (BACTRIM DS) 800-160 mg Tab; Take 1 tablet by mouth 2 (two) times daily. for 7 days  Dispense: 14 tablet; Refill: 0            Patient was counseled today on A.C.S. Pap guidelines and recommendations for yearly pelvic exams, mammograms and monthly self breast exams; to see her PCP for other health maintenance.   Exercise regimen encouraged  Healthy food choices encouraged  Questions answered to desired level of satisfaction  Verbalized understanding to all information and instructions    Follow up in about 1 year (around 7/29/2025) for Annual.      Christian uJrado M.D., FCOG    OB/GYN

## 2024-07-30 ENCOUNTER — TELEPHONE (OUTPATIENT)
Dept: OBSTETRICS AND GYNECOLOGY | Facility: CLINIC | Age: 49
End: 2024-07-30

## 2024-07-30 LAB
CHLAMYDIA BY PCR: NEGATIVE
N. GONORRHOEAE (GC) BY PCR: NEGATIVE

## 2024-07-30 NOTE — TELEPHONE ENCOUNTER
----- Message from Hannah Carlos sent at 7/29/2024  3:56 PM CDT -----  Pt has a question about her prescription and wants know could she get a different kind due to her paying out of pocket             [phone]

## 2024-08-01 ENCOUNTER — TELEPHONE (OUTPATIENT)
Dept: OBSTETRICS AND GYNECOLOGY | Facility: CLINIC | Age: 49
End: 2024-08-01
Payer: COMMERCIAL

## 2024-08-01 ENCOUNTER — HOSPITAL ENCOUNTER (EMERGENCY)
Facility: HOSPITAL | Age: 49
Discharge: HOME | End: 2024-08-01
Payer: COMMERCIAL

## 2024-08-01 VITALS
HEIGHT: 63 IN | DIASTOLIC BLOOD PRESSURE: 100 MMHG | BODY MASS INDEX: 27.11 KG/M2 | HEART RATE: 62 BPM | RESPIRATION RATE: 19 BRPM | WEIGHT: 153 LBS | OXYGEN SATURATION: 100 % | TEMPERATURE: 98 F | SYSTOLIC BLOOD PRESSURE: 160 MMHG

## 2024-08-01 DIAGNOSIS — B96.89 BV (BACTERIAL VAGINOSIS): Primary | ICD-10-CM

## 2024-08-01 DIAGNOSIS — R51.9 NONINTRACTABLE HEADACHE, UNSPECIFIED CHRONICITY PATTERN, UNSPECIFIED HEADACHE TYPE: Primary | ICD-10-CM

## 2024-08-01 DIAGNOSIS — N76.0 BV (BACTERIAL VAGINOSIS): Primary | ICD-10-CM

## 2024-08-01 LAB
ANION GAP SERPL CALCULATED.3IONS-SCNC: 10 MMOL/L (ref 7–16)
BASOPHILS # BLD AUTO: 0.08 K/UL (ref 0–0.2)
BASOPHILS NFR BLD AUTO: 0.9 % (ref 0–1)
BUN SERPL-MCNC: 8 MG/DL (ref 7–18)
BUN/CREAT SERPL: 10 (ref 6–20)
CALCIUM SERPL-MCNC: 9.2 MG/DL (ref 8.5–10.1)
CHLORIDE SERPL-SCNC: 106 MMOL/L (ref 98–107)
CO2 SERPL-SCNC: 30 MMOL/L (ref 21–32)
CREAT SERPL-MCNC: 0.78 MG/DL (ref 0.55–1.02)
DIFFERENTIAL METHOD BLD: ABNORMAL
EGFR (NO RACE VARIABLE) (RUSH/TITUS): 93 ML/MIN/1.73M2
EOSINOPHIL # BLD AUTO: 0.19 K/UL (ref 0–0.5)
EOSINOPHIL NFR BLD AUTO: 2.2 % (ref 1–4)
ERYTHROCYTE [DISTWIDTH] IN BLOOD BY AUTOMATED COUNT: 16.3 % (ref 11.5–14.5)
GLUCOSE SERPL-MCNC: 94 MG/DL (ref 74–106)
HCT VFR BLD AUTO: 34.8 % (ref 38–47)
HGB BLD-MCNC: 10.8 G/DL (ref 12–16)
IMM GRANULOCYTES # BLD AUTO: 0.03 K/UL (ref 0–0.04)
IMM GRANULOCYTES NFR BLD: 0.4 % (ref 0–0.4)
LYMPHOCYTES # BLD AUTO: 2.01 K/UL (ref 1–4.8)
LYMPHOCYTES NFR BLD AUTO: 23.6 % (ref 27–41)
MCH RBC QN AUTO: 25.4 PG (ref 27–31)
MCHC RBC AUTO-ENTMCNC: 31 G/DL (ref 32–36)
MCV RBC AUTO: 81.9 FL (ref 80–96)
MONOCYTES # BLD AUTO: 0.43 K/UL (ref 0–0.8)
MONOCYTES NFR BLD AUTO: 5 % (ref 2–6)
MPC BLD CALC-MCNC: 9.6 FL (ref 9.4–12.4)
NEUTROPHILS # BLD AUTO: 5.79 K/UL (ref 1.8–7.7)
NEUTROPHILS NFR BLD AUTO: 67.9 % (ref 53–65)
NRBC # BLD AUTO: 0 X10E3/UL
NRBC, AUTO (.00): 0 %
PLATELET # BLD AUTO: 328 K/UL (ref 150–400)
POTASSIUM SERPL-SCNC: 4.5 MMOL/L (ref 3.5–5.1)
RBC # BLD AUTO: 4.25 M/UL (ref 4.2–5.4)
SODIUM SERPL-SCNC: 141 MMOL/L (ref 136–145)
WBC # BLD AUTO: 8.53 K/UL (ref 4.5–11)

## 2024-08-01 PROCEDURE — 63600175 PHARM REV CODE 636 W HCPCS: Performed by: NURSE PRACTITIONER

## 2024-08-01 PROCEDURE — 96372 THER/PROPH/DIAG INJ SC/IM: CPT | Performed by: NURSE PRACTITIONER

## 2024-08-01 PROCEDURE — 85025 COMPLETE CBC W/AUTO DIFF WBC: CPT | Performed by: NURSE PRACTITIONER

## 2024-08-01 PROCEDURE — 80048 BASIC METABOLIC PNL TOTAL CA: CPT | Performed by: NURSE PRACTITIONER

## 2024-08-01 PROCEDURE — 99284 EMERGENCY DEPT VISIT MOD MDM: CPT | Mod: 25

## 2024-08-01 RX ORDER — PROMETHAZINE HYDROCHLORIDE 25 MG/ML
25 INJECTION, SOLUTION INTRAMUSCULAR; INTRAVENOUS
Status: COMPLETED | OUTPATIENT
Start: 2024-08-01 | End: 2024-08-01

## 2024-08-01 RX ORDER — METRONIDAZOLE 500 MG/1
500 TABLET ORAL 2 TIMES DAILY
Qty: 14 TABLET | Refills: 0 | Status: SHIPPED | OUTPATIENT
Start: 2024-08-01 | End: 2024-08-08

## 2024-08-01 RX ORDER — KETOROLAC TROMETHAMINE 30 MG/ML
60 INJECTION, SOLUTION INTRAMUSCULAR; INTRAVENOUS
Status: COMPLETED | OUTPATIENT
Start: 2024-08-01 | End: 2024-08-01

## 2024-08-01 RX ADMIN — PROMETHAZINE HYDROCHLORIDE 25 MG: 25 INJECTION INTRAMUSCULAR; INTRAVENOUS at 10:08

## 2024-08-01 RX ADMIN — KETOROLAC TROMETHAMINE 60 MG: 30 INJECTION, SOLUTION INTRAMUSCULAR at 10:08

## 2024-08-01 NOTE — TELEPHONE ENCOUNTER
----- Message from Christian Jurado MD sent at 8/1/2024 10:40 AM CDT -----  Your Affirm test resulted in bacterial vaginosis. A prescription has been sent to the pharmacy on file. Thank you.

## 2024-08-03 LAB
HPV 16: NEGATIVE
HPV 18: NEGATIVE
HPV OTHER: POSITIVE

## 2024-08-09 ENCOUNTER — HOSPITAL ENCOUNTER (EMERGENCY)
Facility: HOSPITAL | Age: 49
Discharge: HOME | End: 2024-08-09
Payer: COMMERCIAL

## 2024-08-09 VITALS
SYSTOLIC BLOOD PRESSURE: 121 MMHG | HEIGHT: 63 IN | RESPIRATION RATE: 16 BRPM | DIASTOLIC BLOOD PRESSURE: 85 MMHG | HEART RATE: 64 BPM | OXYGEN SATURATION: 99 % | TEMPERATURE: 98 F | BODY MASS INDEX: 26.58 KG/M2 | WEIGHT: 150 LBS

## 2024-08-09 DIAGNOSIS — R51.9 NONINTRACTABLE HEADACHE, UNSPECIFIED CHRONICITY PATTERN, UNSPECIFIED HEADACHE TYPE: Primary | ICD-10-CM

## 2024-08-09 PROCEDURE — 63600175 PHARM REV CODE 636 W HCPCS: Performed by: NURSE PRACTITIONER

## 2024-08-09 PROCEDURE — 99284 EMERGENCY DEPT VISIT MOD MDM: CPT | Mod: 25

## 2024-08-09 PROCEDURE — 96372 THER/PROPH/DIAG INJ SC/IM: CPT | Performed by: NURSE PRACTITIONER

## 2024-08-09 RX ORDER — KETOROLAC TROMETHAMINE 30 MG/ML
60 INJECTION, SOLUTION INTRAMUSCULAR; INTRAVENOUS
Status: COMPLETED | OUTPATIENT
Start: 2024-08-09 | End: 2024-08-09

## 2024-08-09 RX ORDER — PROMETHAZINE HYDROCHLORIDE 25 MG/ML
25 INJECTION, SOLUTION INTRAMUSCULAR; INTRAVENOUS
Status: COMPLETED | OUTPATIENT
Start: 2024-08-09 | End: 2024-08-09

## 2024-08-09 RX ADMIN — PROMETHAZINE HYDROCHLORIDE 25 MG: 25 INJECTION INTRAMUSCULAR; INTRAVENOUS at 10:08

## 2024-08-09 RX ADMIN — KETOROLAC TROMETHAMINE 60 MG: 30 INJECTION, SOLUTION INTRAMUSCULAR at 10:08

## 2024-08-09 NOTE — ED PROVIDER NOTES
Encounter Date: 8/9/2024       History     Chief Complaint   Patient presents with    Headache     Pt reports left sided headache, she reports its been ongoing x1year. She states she just needs something for pain.      49-year-old female presents to the emergency department to be evaluated for headache.  She reports that she has had headaches just like this frequently over the last several years.  She reports this is no different than the headaches that she always has.  She has been evaluated by Dr. Sewell and started on Elavil.  She reports she stopped taking the Elavil.  She also is concerned because she feels like her hands and feet are both swollen.  Denies any recent illness, nausea, vomiting, diarrhea, abdominal pain, chest pain, shortness of breath.  Denies any recent fall or head injury    The history is provided by the patient.   Headache   This is a chronic problem. The current episode started more than 1 year ago. Pertinent negatives include no coughing, fever, rhinorrhea, scalp tenderness, seizures, sinus pressure, sore throat, swollen glands, tingling, tinnitus, visual change, vomiting or weakness.     Review of patient's allergies indicates:   Allergen Reactions    Ace inhibitors Swelling    Azithromycin Swelling    Ferrous sulfate Anaphylaxis    Penicillins     Rocephin [ceftriaxone]     Terazol 3 [terconazole] Other (See Comments)     Vaginal irritation and swollen     Past Medical History:   Diagnosis Date    Bone disease     Degenerative    Chronic sinusitis, unspecified     Crohn's disease     History of herpes genitalis     Hypertension     Pancreatitis     Sciatica      Past Surgical History:   Procedure Laterality Date    CLOSED REDUCTION OF FRACTURE OF NASAL BONE N/A 2/20/2024    Procedure: CLOSED REDUCTION, FRACTURE, NASAL BONE;  Surgeon: Karlos Gallegos MD;  Location: [facility];  Service: ENT;  Laterality: N/A;    CYSTOSCOPY N/A 6/28/2023    Procedure: CYSTOSCOPY;  Surgeon:  Christian Jurado MD;  Location: [facility] OR;  Service: OB/GYN;  Laterality: N/A;    HYSTERECTOMY, TOTAL, LAPAROSCOPIC, WITH SALPINGO-OOPHORECTOMY Bilateral 2023    Procedure: HYSTERECTOMY,TOTAL,LAPAROSCOPIC,WITH SALPINGO-OOPHORECTOMY;  Surgeon: Christian Jurado MD;  Location: [facility] OR;  Service: OB/GYN;  Laterality: Bilateral;    SINUS SURGERY      TONSILLECTOMY AND ADENOIDECTOMY      TUBAL LIGATION       Family History   Problem Relation Name Age of Onset    Hypertension Mother      Hypertension Child       Social History     Tobacco Use    Smoking status: Former     Current packs/day: 0.00     Types: Cigarettes     Quit date: 3/1/2023     Years since quittin.4     Passive exposure: Current    Smokeless tobacco: Never   Substance Use Topics    Alcohol use: Never    Drug use: Not Currently     Types: Marijuana     Comment: stopped 2 months ago     Review of Systems   Constitutional:  Negative for chills and fever.   HENT:  Negative for rhinorrhea, sinus pressure, sore throat and tinnitus.    Respiratory:  Negative for cough.    Gastrointestinal:  Negative for vomiting.   Neurological:  Positive for headaches. Negative for tingling, seizures and weakness.   All other systems reviewed and are negative.      Physical Exam     Initial Vitals   BP Pulse Resp Temp SpO2   24 1011 24 1011 24 1011 24 1011 24 1011   121/85 64 16 98.1 °F (36.7 °C) 99 %      MAP       --                Physical Exam    Vitals reviewed.  Constitutional: She appears well-developed and well-nourished.   Neck: Neck supple.   Cardiovascular:  Normal rate and regular rhythm.           Pulmonary/Chest: Breath sounds normal.   Abdominal: Abdomen is soft. Bowel sounds are normal. She exhibits no distension and no mass. There is no abdominal tenderness. There is no rebound and no guarding.   Musculoskeletal:         General: Normal range of motion.      Cervical back: Neck supple.     Neurological: She  is alert and oriented to person, place, and time. She has normal strength. GCS score is 15. GCS eye subscore is 4. GCS verbal subscore is 5. GCS motor subscore is 6.   Skin: Skin is warm and dry. Capillary refill takes less than 2 seconds.   Psychiatric: She has a normal mood and affect.         Medical Screening Exam   See Full Note    ED Course   Procedures  Labs Reviewed - No data to display       Imaging Results    None          Medications   ketorolac injection 60 mg (60 mg Intramuscular Given 8/9/24 1026)   promethazine injection 25 mg (25 mg Intramuscular Given 8/9/24 1027)     Medical Decision Making  49-year-old female presents to the emergency department to be evaluated for headache.  She reports that she has had headaches just like this frequently over the last several years.  She reports this is no different than the headaches that she always has.  She has been evaluated by Dr. Sewell and started on Elavil.  She reports she stopped taking the Elavil.  She also is concerned because she feels like her hands and feet are both swollen.  Denies any recent illness, nausea, vomiting, diarrhea, abdominal pain, chest pain, shortness of breath.  Denies any recent fall or head injury  Treated with Toradol and Phenergan  She has an appointment on 08/22/2024 with Dr. Aguirre, Neurology  Diagnosis: Headache    Risk  Prescription drug management.                                      Clinical Impression:   Final diagnoses:  [R51.9] Nonintractable headache, unspecified chronicity pattern, unspecified headache type (Primary)        ED Disposition Condition    Discharge Stable          ED Prescriptions    None       Follow-up Information    None          Beryl Sanchez FNP  08/09/24 1030

## 2024-08-09 NOTE — DISCHARGE INSTRUCTIONS
Follow up with . Return to the emergency department for any increase in symptoms or for any other new or worrisome symptoms.

## 2024-08-14 ENCOUNTER — HOSPITAL ENCOUNTER (EMERGENCY)
Facility: HOSPITAL | Age: 49
Discharge: HOME | End: 2024-08-14
Attending: EMERGENCY MEDICINE
Payer: COMMERCIAL

## 2024-08-14 VITALS
WEIGHT: 165 LBS | HEART RATE: 76 BPM | OXYGEN SATURATION: 98 % | BODY MASS INDEX: 29.23 KG/M2 | RESPIRATION RATE: 16 BRPM | SYSTOLIC BLOOD PRESSURE: 125 MMHG | HEIGHT: 63 IN | TEMPERATURE: 98 F | DIASTOLIC BLOOD PRESSURE: 75 MMHG

## 2024-08-14 DIAGNOSIS — S61.211A LACERATION OF LEFT INDEX FINGER WITHOUT FOREIGN BODY WITHOUT DAMAGE TO NAIL, INITIAL ENCOUNTER: Primary | ICD-10-CM

## 2024-08-14 PROCEDURE — 25000003 PHARM REV CODE 250: Performed by: EMERGENCY MEDICINE

## 2024-08-14 PROCEDURE — 90471 IMMUNIZATION ADMIN: CPT | Performed by: EMERGENCY MEDICINE

## 2024-08-14 PROCEDURE — 99284 EMERGENCY DEPT VISIT MOD MDM: CPT | Mod: 25

## 2024-08-14 PROCEDURE — 90715 TDAP VACCINE 7 YRS/> IM: CPT | Performed by: EMERGENCY MEDICINE

## 2024-08-14 PROCEDURE — 63600175 PHARM REV CODE 636 W HCPCS: Performed by: EMERGENCY MEDICINE

## 2024-08-14 RX ORDER — ACETAMINOPHEN 500 MG
1000 TABLET ORAL
Status: COMPLETED | OUTPATIENT
Start: 2024-08-14 | End: 2024-08-14

## 2024-08-14 RX ADMIN — BACITRACIN ZINC, NEOMYCIN SULFATE , POLYMYXIN B SULFATE. 1 EACH: 400; 3.5; 5 OINTMENT TOPICAL at 03:08

## 2024-08-14 RX ADMIN — TETANUS TOXOID, REDUCED DIPHTHERIA TOXOID AND ACELLULAR PERTUSSIS VACCINE, ADSORBED 0.5 ML: 5; 2.5; 8; 8; 2.5 SUSPENSION INTRAMUSCULAR at 03:08

## 2024-08-14 RX ADMIN — ACETAMINOPHEN 1000 MG: 500 TABLET ORAL at 03:08

## 2024-08-14 NOTE — ED PROVIDER NOTES
Encounter Date: 8/14/2024    SCRIBE #1 NOTE: I, Kami Smith, am scribing for, and in the presence of,  Santana Dover MD.       History     Chief Complaint   Patient presents with    Laceration     Laceration noted to left index finger. Pt states using saw when finger got stuck in between blades     This patient is a 49 y.o. female that presents to the ED with c/o Laceration on finger. The patient has a Laceration noted to left index finger. Patient said she was using a   when her finger got stuck in between blades. She stated she had to turn it back on in order to remove her finger from the . Upon eximantion patient has full rang of motion of the injured finger. There are no other issues to report with this patient at this time.     The history is provided by the patient. No  was used.     Review of patient's allergies indicates:   Allergen Reactions    Ace inhibitors Swelling    Azithromycin Swelling    Ferrous sulfate Anaphylaxis    Penicillins     Rocephin [ceftriaxone]     Terazol 3 [terconazole] Other (See Comments)     Vaginal irritation and swollen     Past Medical History:   Diagnosis Date    Bone disease     Degenerative    Chronic sinusitis, unspecified     Crohn's disease     History of herpes genitalis     Hypertension     Pancreatitis     Sciatica      Past Surgical History:   Procedure Laterality Date    CLOSED REDUCTION OF FRACTURE OF NASAL BONE N/A 2/20/2024    Procedure: CLOSED REDUCTION, FRACTURE, NASAL BONE;  Surgeon: Karlos Gallegos MD;  Location: [facility] OR;  Service: ENT;  Laterality: N/A;    CYSTOSCOPY N/A 6/28/2023    Procedure: CYSTOSCOPY;  Surgeon: Christian Jurado MD;  Location: [facility] OR;  Service: OB/GYN;  Laterality: N/A;    HYSTERECTOMY, TOTAL, LAPAROSCOPIC, WITH SALPINGO-OOPHORECTOMY Bilateral 6/28/2023    Procedure: HYSTERECTOMY,TOTAL,LAPAROSCOPIC,WITH SALPINGO-OOPHORECTOMY;  Surgeon: Christian Jurado MD;   Location: [facility];  Service: OB/GYN;  Laterality: Bilateral;    SINUS SURGERY      TONSILLECTOMY AND ADENOIDECTOMY      TUBAL LIGATION       Family History   Problem Relation Name Age of Onset    Hypertension Mother      Hypertension Child       Social History     Tobacco Use    Smoking status: Former     Current packs/day: 0.00     Types: Cigarettes     Quit date: 3/1/2023     Years since quittin.4     Passive exposure: Current    Smokeless tobacco: Never   Substance Use Topics    Alcohol use: Never    Drug use: Not Currently     Types: Marijuana     Comment: stopped 2 months ago     Review of Systems   Constitutional:  Negative for chills, fatigue and fever.   Eyes:  Negative for pain.   Respiratory:  Negative for shortness of breath.    Cardiovascular:  Negative for chest pain.   Gastrointestinal:  Negative for nausea.   Endocrine: Negative for polyuria.   Genitourinary:  Negative for difficulty urinating.   Allergic/Immunologic: Negative.    All other systems reviewed and are negative.      Physical Exam     Initial Vitals [24 1409]   BP Pulse Resp Temp SpO2   125/75 76 16 98.2 °F (36.8 °C) 98 %      MAP       --         Physical Exam    Nursing note and vitals reviewed.  Constitutional: She appears well-developed and well-nourished.   HENT:   Head: Normocephalic and atraumatic.   Eyes: EOM are normal. Pupils are equal, round, and reactive to light.   Neck: Neck supple. No thyromegaly present.   Normal range of motion.  Cardiovascular:  Normal rate, regular rhythm, normal heart sounds and intact distal pulses.           No murmur heard.  Pulmonary/Chest: Breath sounds normal. No respiratory distress. She has no wheezes.   Abdominal: Abdomen is soft. Bowel sounds are normal. She exhibits no distension. There is no abdominal tenderness.   Musculoskeletal:         General: No tenderness or edema. Normal range of motion.      Left hand: Laceration present.        Arms:       Cervical back: Normal  range of motion and neck supple.      Comments: Patient has a laceration on her left index finger. Patient has full rang of motion with the injured finger, and there is no tendon damage of the injured finger.     Lymphadenopathy:     She has no cervical adenopathy.   Neurological: She is alert and oriented to person, place, and time. She has normal strength. No cranial nerve deficit or sensory deficit.   Skin: Skin is warm and dry. No rash noted.   Psychiatric: She has a normal mood and affect.         ED Course   Procedures  Labs Reviewed - No data to display       Imaging Results    None          Medications   Tdap (BOOSTRIX) vaccine injection 0.5 mL (0.5 mLs Intramuscular Given 8/14/24 1515)   acetaminophen tablet 1,000 mg (1,000 mg Oral Given 8/14/24 1515)   neomycin-bacitracnZn-polymyxnB packet (1 each Topical (Top) Given 8/14/24 1515)     Medical Decision Making  Risk  OTC drugs.  Prescription drug management.              Attending Attestation:           Physician Attestation for Scribe:  Physician Attestation Statement for Scribe #1: I, Santana Dover MD, reviewed documentation, as scribed by Kami Smith in my presence, and it is both accurate and complete.             ED Course as of 08/28/24 1048   Wed Aug 14, 2024   1425 Wound does not require sutures.  It was not full-thickness.  Well approximated.  No evidence of tendon injury.  Full range of motion all joints of finger.  Will give Tdap and have bandage applied with antibiotic ointment.  Also treating with Tylenol [PK]      ED Course User Index  [PK] Santana Dover MD                           Clinical Impression:  Final diagnoses:  [S61.211A] Laceration of left index finger without foreign body without damage to nail, initial encounter (Primary)          ED Disposition Condition    Discharge Stable          ED Prescriptions    None       Follow-up Information       Follow up With Specialties Details Why Contact Info    [facility]  - Emergency Department Emergency Medicine Go to  As needed, If symptoms worsen [address] [place] [id number] [phone]             Santana Dover MD  08/28/24 6406

## 2024-08-14 NOTE — DISCHARGE INSTRUCTIONS
Use antibiotic ointment daily.  Change bandages daily.    Use Tylenol or ibuprofen as needed    Return the ER if symptoms worsen or new symptoms develop

## 2024-08-22 ENCOUNTER — OFFICE VISIT (OUTPATIENT)
Dept: NEUROLOGY | Facility: CLINIC | Age: 49
End: 2024-08-22
Payer: COMMERCIAL

## 2024-08-22 VITALS
BODY MASS INDEX: 27.29 KG/M2 | RESPIRATION RATE: 18 BRPM | HEIGHT: 63 IN | DIASTOLIC BLOOD PRESSURE: 84 MMHG | OXYGEN SATURATION: 99 % | HEART RATE: 67 BPM | WEIGHT: 154 LBS | SYSTOLIC BLOOD PRESSURE: 120 MMHG

## 2024-08-22 DIAGNOSIS — Z72.0 TOBACCO USE: Primary | ICD-10-CM

## 2024-08-22 PROCEDURE — 3008F BODY MASS INDEX DOCD: CPT | Mod: CPTII,,, | Performed by: PSYCHIATRY & NEUROLOGY

## 2024-08-22 PROCEDURE — 99214 OFFICE O/P EST MOD 30 MIN: CPT | Mod: PBBFAC | Performed by: PSYCHIATRY & NEUROLOGY

## 2024-08-22 PROCEDURE — 1159F MED LIST DOCD IN RCRD: CPT | Mod: CPTII,,, | Performed by: PSYCHIATRY & NEUROLOGY

## 2024-08-22 PROCEDURE — 99999 PR PBB SHADOW E&M-EST. PATIENT-LVL IV: CPT | Mod: PBBFAC,,, | Performed by: PSYCHIATRY & NEUROLOGY

## 2024-08-22 PROCEDURE — 99214 OFFICE O/P EST MOD 30 MIN: CPT | Mod: S$PBB,,, | Performed by: PSYCHIATRY & NEUROLOGY

## 2024-08-22 PROCEDURE — 3079F DIAST BP 80-89 MM HG: CPT | Mod: CPTII,,, | Performed by: PSYCHIATRY & NEUROLOGY

## 2024-08-22 PROCEDURE — 3074F SYST BP LT 130 MM HG: CPT | Mod: CPTII,,, | Performed by: PSYCHIATRY & NEUROLOGY

## 2024-08-22 RX ORDER — TOPIRAMATE 25 MG/1
50 TABLET ORAL 2 TIMES DAILY
Qty: 360 TABLET | Refills: 3 | Status: SHIPPED | OUTPATIENT
Start: 2024-08-22

## 2024-08-22 NOTE — PROGRESS NOTES
Subjective:       Patient ID: Martita Hilliard is a 49 y.o. female     Chief Complaint:    Chief Complaint   Patient presents with    chronic headaches     Pt. States dizziness and falling. Headaches wake pt. Up   out of sleep. Vision going black in left eye.        Allergies:  Ace inhibitors, Azithromycin, Ferrous sulfate, Penicillins, Rocephin [ceftriaxone], and Terazol 3 [terconazole]    Current Medications:    Outpatient Encounter Medications as of 2024   Medication Sig Dispense Refill    PARoxetine mesylate,menop.sym, (BRISDELLE) 7.5 mg Cap Take 7.5 mg by mouth once daily. 30 capsule 11    [] metroNIDAZOLE (FLAGYL) 500 MG tablet Take 1 tablet (500 mg total) by mouth 2 (two) times a day. for 14 doses 14 tablet 0    [] sulfamethoxazole-trimethoprim 800-160mg (BACTRIM DS) 800-160 mg Tab Take 1 tablet by mouth 2 (two) times daily. for 7 days 14 tablet 0    [DISCONTINUED] lisinopriL-hydrochlorothiazide (PRINZIDE,ZESTORETIC) 20-25 mg Tab Take 1 tablet by mouth once daily.       No facility-administered encounter medications on file as of 2024.       History of Present Illness  48 yo BF w/ many yrs of migraines described as unilaterl L sided and throbbing pain   Freq every other day  Has failed mult preventive and abortive meds -Elavil, maxalt and OTC meds, many she cannot name ?         Past Medical History:   Diagnosis Date    Bone disease     Degenerative    Chronic sinusitis, unspecified     Crohn's disease     History of herpes genitalis     Hypertension     Pancreatitis     Sciatica        Past Surgical History:   Procedure Laterality Date    CLOSED REDUCTION OF FRACTURE OF NASAL BONE N/A 2024    Procedure: CLOSED REDUCTION, FRACTURE, NASAL BONE;  Surgeon: Karlos Gallegos MD;  Location: [facility] OR;  Service: ENT;  Laterality: N/A;    CYSTOSCOPY N/A 2023    Procedure: CYSTOSCOPY;  Surgeon: Christian Jurado MD;  Location: [facility] OR;  Service: OB/GYN;  Laterality:  "N/A;    HYSTERECTOMY, TOTAL, LAPAROSCOPIC, WITH SALPINGO-OOPHORECTOMY Bilateral 6/28/2023    Procedure: HYSTERECTOMY,TOTAL,LAPAROSCOPIC,WITH SALPINGO-OOPHORECTOMY;  Surgeon: Christian Jurado MD;  Location: [facility];  Service: OB/GYN;  Laterality: Bilateral;    SINUS SURGERY      TONSILLECTOMY AND ADENOIDECTOMY      TUBAL LIGATION         Social History  Ms. Hilliard  reports that she quit smoking about 17 months ago. Her smoking use included cigarettes. She has been exposed to tobacco smoke. She has never used smokeless tobacco. She reports that she does not currently use drugs after having used the following drugs: Marijuana. She reports that she does not drink alcohol.    Family History  Ms.'s Hilliard family history includes Hypertension in her child and mother.    Review of Systems  Review of Systems   Neurological:  Positive for headaches.   Psychiatric/Behavioral:  Positive for depression. The patient is nervous/anxious.    All other systems reviewed and are negative.     Objective:   /84 (BP Location: Right arm, Patient Position: Sitting, BP Method: Large (Manual))   Pulse 67   Resp 18   Ht 5' 3" (1.6 m)   Wt 69.9 kg (154 lb)   LMP 06/24/2023 (Exact Date)   SpO2 99%   BMI 27.28 kg/m²    NEUROLOGICAL EXAMINATION:     MENTAL STATUS   Oriented to person, place, and time.   Level of consciousness: alert  Knowledge: consistent with education and good.     CRANIAL NERVES   Cranial nerves II through XII intact.     MOTOR EXAM     Strength   Strength 5/5 throughout.     GAIT AND COORDINATION        Needs cane intermittently        Physical Exam  Vitals reviewed.   Constitutional:       Appearance: She is normal weight.   Neurological:      Mental Status: She is alert and oriented to person, place, and time. Mental status is at baseline.      Cranial Nerves: Cranial nerves 2-12 are intact.      Motor: Motor strength is normal.         Assessment:     Tobacco use         Primary Diagnosis and ICD10  " Tobacco use [Z72.0]    Plan:     Patient Instructions   MRI brain w/ contrast  Trial Topamax 50 mg twice daily  Avoid any triggers   F/u 3 months    There are no discontinued medications.    Requested Prescriptions      No prescriptions requested or ordered in this encounter

## 2024-09-24 ENCOUNTER — HOSPITAL ENCOUNTER (OUTPATIENT)
Dept: RADIOLOGY | Facility: HOSPITAL | Age: 49
Discharge: HOME | End: 2024-09-24
Attending: PSYCHIATRY & NEUROLOGY
Payer: COMMERCIAL

## 2024-09-24 DIAGNOSIS — Z72.0 TOBACCO USE: ICD-10-CM

## 2024-09-24 PROCEDURE — A9577 INJ MULTIHANCE: HCPCS | Performed by: PSYCHIATRY & NEUROLOGY

## 2024-09-24 PROCEDURE — 70553 MRI BRAIN STEM W/O & W/DYE: CPT | Mod: TC

## 2024-09-24 PROCEDURE — 25500020 PHARM REV CODE 255: Performed by: PSYCHIATRY & NEUROLOGY

## 2024-09-24 RX ADMIN — GADOBENATE DIMEGLUMINE 14 ML: 529 INJECTION, SOLUTION INTRAVENOUS at 10:09

## 2024-09-26 ENCOUNTER — OFFICE VISIT (OUTPATIENT)
Dept: FAMILY MEDICINE | Facility: CLINIC | Age: 49
End: 2024-09-26
Payer: COMMERCIAL

## 2024-09-26 VITALS
OXYGEN SATURATION: 99 % | DIASTOLIC BLOOD PRESSURE: 84 MMHG | WEIGHT: 162 LBS | RESPIRATION RATE: 20 BRPM | HEIGHT: 63 IN | HEART RATE: 63 BPM | TEMPERATURE: 98 F | SYSTOLIC BLOOD PRESSURE: 134 MMHG | BODY MASS INDEX: 28.7 KG/M2

## 2024-09-26 DIAGNOSIS — M54.41 CHRONIC RIGHT-SIDED LOW BACK PAIN WITH RIGHT-SIDED SCIATICA: Primary | ICD-10-CM

## 2024-09-26 DIAGNOSIS — G89.29 CHRONIC RIGHT-SIDED LOW BACK PAIN WITH RIGHT-SIDED SCIATICA: Primary | ICD-10-CM

## 2024-09-26 PROCEDURE — 3075F SYST BP GE 130 - 139MM HG: CPT | Mod: CPTII,,, | Performed by: NURSE PRACTITIONER

## 2024-09-26 PROCEDURE — 3079F DIAST BP 80-89 MM HG: CPT | Mod: CPTII,,, | Performed by: NURSE PRACTITIONER

## 2024-09-26 PROCEDURE — 99213 OFFICE O/P EST LOW 20 MIN: CPT | Mod: 25,,, | Performed by: NURSE PRACTITIONER

## 2024-09-26 PROCEDURE — 3008F BODY MASS INDEX DOCD: CPT | Mod: CPTII,,, | Performed by: NURSE PRACTITIONER

## 2024-09-26 PROCEDURE — 96372 THER/PROPH/DIAG INJ SC/IM: CPT | Mod: ,,, | Performed by: NURSE PRACTITIONER

## 2024-09-26 RX ORDER — DEXAMETHASONE SODIUM PHOSPHATE 4 MG/ML
6 INJECTION, SOLUTION INTRA-ARTICULAR; INTRALESIONAL; INTRAMUSCULAR; INTRAVENOUS; SOFT TISSUE ONCE
Status: COMPLETED | OUTPATIENT
Start: 2024-09-26 | End: 2024-09-26

## 2024-09-26 RX ORDER — PREDNISONE 20 MG/1
20 TABLET ORAL DAILY
Qty: 5 TABLET | Refills: 0 | Status: SHIPPED | OUTPATIENT
Start: 2024-09-26

## 2024-09-26 RX ORDER — KETOROLAC TROMETHAMINE 30 MG/ML
30 INJECTION, SOLUTION INTRAMUSCULAR; INTRAVENOUS
Status: COMPLETED | OUTPATIENT
Start: 2024-09-26 | End: 2024-09-26

## 2024-09-26 RX ORDER — METHOCARBAMOL 500 MG/1
500 TABLET, FILM COATED ORAL 4 TIMES DAILY
Qty: 40 TABLET | Refills: 0 | Status: SHIPPED | OUTPATIENT
Start: 2024-09-26 | End: 2024-10-06

## 2024-09-26 RX ORDER — DICLOFENAC SODIUM 75 MG/1
75 TABLET, DELAYED RELEASE ORAL 2 TIMES DAILY
Qty: 30 TABLET | Refills: 0 | Status: SHIPPED | OUTPATIENT
Start: 2024-09-26

## 2024-09-26 RX ADMIN — DEXAMETHASONE SODIUM PHOSPHATE 6 MG: 4 INJECTION, SOLUTION INTRA-ARTICULAR; INTRALESIONAL; INTRAMUSCULAR; INTRAVENOUS; SOFT TISSUE at 10:09

## 2024-09-26 RX ADMIN — KETOROLAC TROMETHAMINE 30 MG: 30 INJECTION, SOLUTION INTRAMUSCULAR; INTRAVENOUS at 10:09
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
## 2024-10-03 ENCOUNTER — OFFICE VISIT (OUTPATIENT)
Dept: OTOLARYNGOLOGY | Facility: CLINIC | Age: 49
End: 2024-10-03
Payer: COMMERCIAL

## 2024-10-03 VITALS — HEIGHT: 63 IN | WEIGHT: 162 LBS | BODY MASS INDEX: 28.7 KG/M2

## 2024-10-03 DIAGNOSIS — R51.9 FREQUENT HEADACHES: ICD-10-CM

## 2024-10-03 DIAGNOSIS — J31.0 OTHER RHINITIS: Primary | ICD-10-CM

## 2024-10-03 DIAGNOSIS — J32.9 CHRONIC SINUSITIS, UNSPECIFIED LOCATION: ICD-10-CM

## 2024-10-03 PROCEDURE — 1159F MED LIST DOCD IN RCRD: CPT | Mod: CPTII,,, | Performed by: OTOLARYNGOLOGY

## 2024-10-03 PROCEDURE — 99214 OFFICE O/P EST MOD 30 MIN: CPT | Mod: S$PBB,,, | Performed by: OTOLARYNGOLOGY

## 2024-10-03 PROCEDURE — 99213 OFFICE O/P EST LOW 20 MIN: CPT | Mod: PBBFAC | Performed by: OTOLARYNGOLOGY

## 2024-10-03 PROCEDURE — 99999 PR PBB SHADOW E&M-EST. PATIENT-LVL III: CPT | Mod: PBBFAC,,, | Performed by: OTOLARYNGOLOGY

## 2024-10-03 PROCEDURE — 1160F RVW MEDS BY RX/DR IN RCRD: CPT | Mod: CPTII,,, | Performed by: OTOLARYNGOLOGY

## 2024-10-03 PROCEDURE — 3008F BODY MASS INDEX DOCD: CPT | Mod: CPTII,,, | Performed by: OTOLARYNGOLOGY

## 2024-10-03 RX ORDER — AZELASTINE 1 MG/ML
1 SPRAY, METERED NASAL 2 TIMES DAILY
Qty: 30 ML | Refills: 2 | Status: SHIPPED | OUTPATIENT
Start: 2024-10-03 | End: 2025-10-03

## 2024-10-03 NOTE — PROGRESS NOTES
Subjective:       Patient ID: Martita Hilliard is a 49 y.o. female.    Chief Complaint: Sinusitis (6 month follow-up on allergies. Pt states she has excessive mucus in the mornings and at night, usually PND--spits out thick, yellow mucus continuously. Pt states she has been on flonase, zyrtec, allegra and claritin with no relief from thick mucus drainage.) and Cough (Thick mucus makes pt cough. )    Sinusitis  Associated symptoms include congestion, coughing and ear pain.   Cough  Associated symptoms include ear pain, postnasal drip and rhinorrhea.     Review of Systems   HENT:  Positive for congestion, ear pain, postnasal drip and rhinorrhea.    Respiratory:  Positive for cough.    All other systems reviewed and are negative.      Objective:      Physical Exam  General: NAD  Head: Normocephalic, atraumatic, no facial asymmetry/normal strength,  Ears: Both auricules normal in appearance, w/o deformities tympanic membranes normal external auditory canals normal  Nose: External nose w/o deformities swollen turbinates no drainage or inflammation  Oral Cavity: Lips, gums, floor of mouth, tongue hard palate, and buccal mucosa without mass/lesion  Oropharynx: Mucosa pink and moist, soft palate, posterior pharynx and oropharyngeal wall without mass/lesion  Neck: Supple, symmetric, trachea midline, no palpable mass/lesion, no palpable cervical lymphadenopathy  Skin: Warm and dry, no concerning lesions  Respiratory: Respirations even, unlabored  Assessment:       1. Other rhinitis    2. Chronic sinusitis, unspecified location    3. Frequent headaches        Plan:       MRI CT sinus and RAST reviewed sinuses by themselves not causing h/as   Will treat with astelin to control drainage   F/u 3 weeks

## 2024-10-09 ENCOUNTER — OFFICE VISIT (OUTPATIENT)
Dept: PAIN MEDICINE | Facility: CLINIC | Age: 49
End: 2024-10-09
Payer: COMMERCIAL

## 2024-10-09 VITALS
DIASTOLIC BLOOD PRESSURE: 75 MMHG | HEART RATE: 75 BPM | WEIGHT: 158 LBS | SYSTOLIC BLOOD PRESSURE: 147 MMHG | HEIGHT: 63 IN | RESPIRATION RATE: 18 BRPM | BODY MASS INDEX: 28 KG/M2

## 2024-10-09 DIAGNOSIS — M48.02 SPINAL STENOSIS, CERVICAL REGION: ICD-10-CM

## 2024-10-09 DIAGNOSIS — Z79.899 ENCOUNTER FOR LONG-TERM (CURRENT) USE OF MEDICATIONS: Primary | ICD-10-CM

## 2024-10-09 DIAGNOSIS — M54.12 CERVICAL RADICULOPATHY: ICD-10-CM

## 2024-10-09 DIAGNOSIS — M54.17 LUMBOSACRAL RADICULOPATHY: ICD-10-CM

## 2024-10-09 DIAGNOSIS — M54.41 CHRONIC RIGHT-SIDED LOW BACK PAIN WITH RIGHT-SIDED SCIATICA: ICD-10-CM

## 2024-10-09 DIAGNOSIS — M54.9 DORSALGIA, UNSPECIFIED: ICD-10-CM

## 2024-10-09 DIAGNOSIS — G89.29 CHRONIC RIGHT-SIDED LOW BACK PAIN WITH RIGHT-SIDED SCIATICA: ICD-10-CM

## 2024-10-09 DIAGNOSIS — M54.16 LUMBAR RADICULOPATHY, CHRONIC: ICD-10-CM

## 2024-10-09 LAB

## 2024-10-09 PROCEDURE — 3078F DIAST BP <80 MM HG: CPT | Mod: CPTII,,, | Performed by: PAIN MEDICINE

## 2024-10-09 PROCEDURE — 80305 DRUG TEST PRSMV DIR OPT OBS: CPT | Mod: PBBFAC | Performed by: PAIN MEDICINE

## 2024-10-09 PROCEDURE — 1159F MED LIST DOCD IN RCRD: CPT | Mod: CPTII,,, | Performed by: PAIN MEDICINE

## 2024-10-09 PROCEDURE — 99215 OFFICE O/P EST HI 40 MIN: CPT | Mod: PBBFAC | Performed by: PAIN MEDICINE

## 2024-10-09 PROCEDURE — 3077F SYST BP >= 140 MM HG: CPT | Mod: CPTII,,, | Performed by: PAIN MEDICINE

## 2024-10-09 PROCEDURE — 99999PBSHW POCT URINE DRUG SCREEN PRESUMP: Mod: PBBFAC,,,

## 2024-10-09 PROCEDURE — 99999 PR PBB SHADOW E&M-EST. PATIENT-LVL V: CPT | Mod: PBBFAC,,, | Performed by: PAIN MEDICINE

## 2024-10-09 PROCEDURE — 99204 OFFICE O/P NEW MOD 45 MIN: CPT | Mod: S$PBB,,, | Performed by: PAIN MEDICINE

## 2024-10-09 PROCEDURE — 3008F BODY MASS INDEX DOCD: CPT | Mod: CPTII,,, | Performed by: PAIN MEDICINE

## 2024-10-09 RX ORDER — ACETAMINOPHEN AND CODEINE PHOSPHATE 300; 30 MG/1; MG/1
1 TABLET ORAL 2 TIMES DAILY PRN
Qty: 30 TABLET | Refills: 0 | Status: SHIPPED | OUTPATIENT
Start: 2024-10-09 | End: 2024-10-24

## 2024-10-09 RX ORDER — GABAPENTIN 300 MG/1
300 CAPSULE ORAL 3 TIMES DAILY
Qty: 90 CAPSULE | Refills: 0 | Status: SHIPPED | OUTPATIENT
Start: 2024-10-09 | End: 2024-11-08

## 2024-10-09 NOTE — PATIENT INSTRUCTIONS
YOU ARE SCHEDULED ON 10/30/2024 AT 10:45 AM FOR YOUR CERVICAL AND LUMBAR MRI.    FOLLOW UP WITH   ON OR AROUND 11/13/24 FOR FOLLOW UP AND RESULTS.    START AND CONTINUE PHYSICAL THERAPY FOR 6 WEEKS

## 2024-10-09 NOTE — PROGRESS NOTES
Chronic Pain - New Consult    Referring Physician: Lakesha Rodney FNP       SUBJECTIVE: Disclaimer: This note has been generated using voice-recognition software. There may be typographical errors that have been missed during proof-reading      Initial encounter:    Martita Hilliard presents to the clinic for the evaluation of cervical and lower back pain.       49-year-old female presents for new patient evaluation and consultation from PIOTR Quispe.  Patient reports a long history of chronic cervical and lower back pain.  She was seen in 2009 by Dr. Olivares but did not receive nerve block injections.  She was  involved in physical therapy on several occasions, but without significant improvement.  Over the past 3 months, she has severe and debilitating pain involving the cervical and lumbar spine.  The pain is throbbing and she has difficulty bending, sitting, standing and most daily activities.  She has fallen on several occasions. The lower back pain radiates to the right buttock, hip and leg.  She also has cervical pain radiating to the arms, hands and fingers.  She notes numbness, tingling and weakness of the upper extremities. She has difficulty with balance and requires use of a cane.  X-ray of the lumbar spine revealed multilevel degenerative changes L3-S1 and facet arthropathy.  Oral prednisone, NSAIDs and Robaxin have provided minimal improvement.        Pain Assessment  Pain Assessment: 0-10  Pain Score:   9  Pain Location: Back  Pain Orientation: Right  Pain Radiating Towards: leg  Pain Descriptors: Aching  Pain Frequency: Constant/continuous  Pain Onset: Awakened from sleep  Aggravating Factors: Walking, Standing, Bending  Pain Intervention(s): Medication (See eMAR), Home medication, Heat applied, Cold applied      Physical Therapy/Home Exercise: yes      Pain Medications:  has a current medication list which includes the following prescription(s): azelastine, diclofenac, paroxetine mesylate(menop.sym),  "prednisone, topiramate, acetaminophen-codeine 300-30mg, gabapentin, and [DISCONTINUED] lisinopril-hydrochlorothiazide.      Tried in Past:  NSAIDS-yes  TCA-no  SNRI-no  Anti-convulsants-no  Muscle Relaxants-no  Opioids-no  Benzodiazepines-no     4A"s of Opioid Risk Assessment  Activity: Patient is unable to perform  ADL  Analgesia:  Patient's pain is partially controlled by current medication.   Aberrant Behavior:  reviewed with no aberrant drug seeking/taking behavior     report:  Reviewed and consistent with medication use as prescribed.    Patient denies suicidal or homicidal ideations    Pain interventional therapy-yes, 2009    Chiropractor -no  Acupuncture - no  TENS unit -no  Massage therapy-no  Spinal decompression -no  Joint replacement -no       Review of Systems   Constitutional: Negative.    HENT: Negative.     Eyes: Negative.    Respiratory: Negative.     Cardiovascular: Negative.    Gastrointestinal: Negative.    Endocrine: Negative.    Genitourinary: Negative.    Musculoskeletal:  Positive for arthralgias, back pain, gait problem, neck pain and neck stiffness.   Integumentary:  Negative.   Neurological:  Positive for weakness (Bilateral upper and lower extremities) and numbness (Bilateral upper and lower extremities).   Hematological: Negative.    Psychiatric/Behavioral:  Positive for sleep disturbance.              X-Ray Lumbar Spine AP And Lateral  Narrative: EXAMINATION:  XR LUMBAR SPINE AP AND LATERAL    CLINICAL HISTORY:  Lumbago with sciatica, right side    TECHNIQUE:  Lumbar spine, AP and lateral views    COMPARISON:  06/15/2015 and 09/08/2015    FINDINGS:  Degenerative facet changes are present from L3 through S1.  Mild degenerative disc changes are present at L2-3 and L3-4.  No subluxation is seen.  SI joints are normal.  There is abundant stool.  Impression: Degenerative changes are present from L3 through S1.    Place of service: [facility]    Electronically signed " by: Shiloh Marquez  Date:    2024  Time:    11:47         Past Medical History:   Diagnosis Date    Bone disease     Degenerative    Chronic sinusitis, unspecified     Crohn's disease     History of herpes genitalis     Hypertension     Pancreatitis     Sciatica      Past Surgical History:   Procedure Laterality Date    CLOSED REDUCTION OF FRACTURE OF NASAL BONE N/A 2024    Procedure: CLOSED REDUCTION, FRACTURE, NASAL BONE;  Surgeon: Karlos Gallegos MD;  [facility];  Service: ENT;  Laterality: N/A;    CYSTOSCOPY N/A 2023    Procedure: CYSTOSCOPY;  Surgeon: Christian Jurado MD;  Location: [facility] OR;  Service: OB/GYN;  Laterality: N/A;    HYSTERECTOMY, TOTAL, LAPAROSCOPIC, WITH SALPINGO-OOPHORECTOMY Bilateral 2023    Procedure: HYSTERECTOMY,TOTAL,LAPAROSCOPIC,WITH SALPINGO-OOPHORECTOMY;  Surgeon: Christian Jurado MD;  Location: [facility] OR;  Service: OB/GYN;  Laterality: Bilateral;    SINUS SURGERY      TONSILLECTOMY AND ADENOIDECTOMY      TUBAL LIGATION       Social History     Socioeconomic History    Marital status: Single   Tobacco Use    Smoking status: Former     Current packs/day: 0.00     Types: Cigarettes     Quit date: 3/1/2023     Years since quittin.6     Passive exposure: Current    Smokeless tobacco: Never   Substance and Sexual Activity    Alcohol use: Never    Drug use: Not Currently     Types: Marijuana     Comment: stopped 2 months ago    Sexual activity: Not Currently     Partners: Male     Birth control/protection: None     Family History   Problem Relation Name Age of Onset    Hypertension Mother      Hypertension Child       Review of patient's allergies indicates:   Allergen Reactions    Ace inhibitors Swelling    Azithromycin Swelling    Ferrous sulfate Anaphylaxis    Penicillins     Rocephin [ceftriaxone]     Terazol 3 [terconazole] Other (See Comments)     Vaginal irritation and swollen         OBJECTIVE:  Vitals:    10/09/24 1416   BP:  "(!) 147/75   Pulse: 75   Resp: 18     BP (!) 147/75 (BP Location: Right arm, Patient Position: Sitting)   Pulse 75   Resp 18   Ht 5' 3" (1.6 m)   Wt 71.7 kg (158 lb)   LMP 06/24/2023 (Exact Date)   BMI 27.99 kg/m²   Physical Exam  Vitals and nursing note reviewed.   Constitutional:       General: She is not in acute distress.     Appearance: Normal appearance. She is not ill-appearing, toxic-appearing or diaphoretic.   HENT:      Head: Normocephalic and atraumatic.      Nose: Nose normal.      Mouth/Throat:      Mouth: Mucous membranes are moist.   Eyes:      Extraocular Movements: Extraocular movements intact.      Pupils: Pupils are equal, round, and reactive to light.   Cardiovascular:      Rate and Rhythm: Normal rate and regular rhythm.      Heart sounds: Normal heart sounds.   Pulmonary:      Effort: Pulmonary effort is normal. No respiratory distress.      Breath sounds: Normal breath sounds. No stridor. No wheezing or rhonchi.   Abdominal:      General: Bowel sounds are normal.      Palpations: Abdomen is soft.   Musculoskeletal:         General: No swelling or deformity.      Cervical back: Tenderness present. No spasms. Pain with movement present. Decreased range of motion.      Thoracic back: Normal.      Lumbar back: Tenderness and bony tenderness present. No spasms. Decreased range of motion. Negative right straight leg raise test and negative left straight leg raise test. No scoliosis.      Right lower leg: No edema.      Left lower leg: No edema.      Comments: Cervical pain with flexion, extension lateral rotation.  Cervical facet tenderness to palpation.  Lumbar pain with flexion extension lateral rotation.  Lumbar facet tenderness to palpation.  SI joint tenderness to palpation.   Skin:     General: Skin is warm.   Neurological:      General: No focal deficit present.      Mental Status: She is alert and oriented to person, place, and time. Mental status is at baseline.      Cranial Nerves: " No cranial nerve deficit.      Sensory: Sensation is intact. No sensory deficit.      Motor: Weakness (Bilateral upper and lower extremities) present.      Coordination: Coordination normal.      Gait: Gait abnormal (Uses a cane).      Deep Tendon Reflexes:      Reflex Scores:       Tricep reflexes are 1+ on the right side and 1+ on the left side.       Bicep reflexes are 1+ on the right side.       Patellar reflexes are 1+ on the right side and 1+ on the left side.       Achilles reflexes are 1+ on the right side.  Psychiatric:         Mood and Affect: Mood normal.         Behavior: Behavior normal.            ASSESSMENT: 49 y.o. year old female with pain, consistent with     Encounter Diagnoses   Name Primary?    Chronic right-sided low back pain with right-sided sciatica     Encounter for long-term (current) use of medications Yes    Cervical radiculopathy     Lumbosacral radiculopathy     Spinal stenosis, cervical region     Dorsalgia, unspecified     Lumbar radiculopathy, chronic         PLAN:   1. reviewed  2..Addiction, Dependency, Tolerance, Opioid abuse-misuse, Death, Diversion Discussed. Overdose reversal drug Naloxone discussed.Patient is prescribed opiates for chronic nonmalignant pain pathology.  Patient is receiving opiates which require greater than a 72 hour supply of therapy.  Patient was educated on potential dependency associated with long-term opioid use as well as decreasing efficacy with prolonged use.  Patient was advised of risks, benefits and side effects and how to utilize each medication.  Patient was also informed that any deviation from therapy protocol will  lead to discontinuation of opiates.  It is reasonable to prescribe opioid analgesics for patient based on positive response to opioid medications, lack of side effects and  limited aberrant behavior.    3. Opioid contract signed today  4.Refill/ Continue medications for pain control and function       Requested Prescriptions      Signed Prescriptions Disp Refills    gabapentin (NEURONTIN) 300 MG capsule 90 capsule 0     Sig: Take 1 capsule (300 mg total) by mouth 3 (three) times daily.    acetaminophen-codeine 300-30mg (TYLENOL #3) 300-30 mg Tab 30 tablet 0     Sig: Take 1 tablet by mouth 2 (two) times daily as needed (pain).     5. Obtain MRI of the cervical and lumbar spine for possible myelopathy  6.Urine drug screen and confirmation testing was ordered as documented on the requisition form in order to monitor for compliance with prescribed opiates and to ensure that there was no misuse/abuse of other non-prescribed opiates or illicit drugs.  I will determine if the patient is suitable for continuation of opioid therapy after obtaining  the definitive urine drug screen for confirmation.  7. Start physical therapy 2 to 3 times a week x6 weeks for cervical and lumbar radiculopathy  Orders Placed This Encounter   Procedures    MRI Cervical Spine Without Contrast     Standing Status:   Future     Standing Expiration Date:   10/9/2025     Order Specific Question:   Does the patient have or ever had a pacemaker or a defibrillator (Note: Some facilities may not be able to schedule an MRI for patients with pacemakers and defibrillators. You should contact your local radiology dept to determine if this is the case.)?     Answer:   No     Order Specific Question:   Does the patient have an aneurysm or surgical clip, pump, nerve/brain stimulator, middle/inner ear prosthesis, or other metal implant or foreign object (bullet, shrapnel)? If they have a card related to their implant, ask them to bring it. Issues related to the implant may cause the MRI to be delayed.     Answer:   No     Order Specific Question:   Will the patient require po anxiolysis or sedation?     Answer:   No     Order Specific Question:   May the Radiologist modify the order per protocol to meet the clinical needs of the patient?     Answer:   Yes     Order Specific Question:    Recist criteria?     Answer:   Yes     Order Specific Question:   Does the patient have on a skin patch for medication with aluminized backing?     Answer:   No    MRI Lumbar Spine Without Contrast     Standing Status:   Future     Standing Expiration Date:   10/9/2025     Order Specific Question:   Does the patient have or ever had a pacemaker or a defibrillator (Note: Some facilities may not be able to schedule an MRI for patients with pacemakers and defibrillators. You should contact your local radiology dept to determine if this is the case.)?     Answer:   No     Order Specific Question:   Does the patient have an aneurysm or surgical clip, pump, nerve/brain stimulator, middle/inner ear prosthesis, or other metal implant or foreign object (bullet, shrapnel)? If they have a card related to their implant, ask them to bring it. Issues related to the implant may cause the MRI to be delayed.     Answer:   No     Order Specific Question:   Will the patient require po anxiolysis or sedation?     Answer:   No     Order Specific Question:   May the Radiologist modify the order per protocol to meet the clinical needs of the patient?     Answer:   Yes     Order Specific Question:   Recist criteria?     Answer:   Yes     Order Specific Question:   Does the patient have on a skin patch for medication with aluminized backing?     Answer:   No    Drug Screen Definitive 14, Urine     Standing Status:   Future     Number of Occurrences:   1     Standing Expiration Date:   12/9/2025     Order Specific Question:   Specimen Source     Answer:   Urine    Ambulatory referral/consult to Physical/Occupational Therapy     Standing Status:   Future     Standing Expiration Date:   11/9/2025     Referral Priority:   Routine     Referral Type:   Physical Medicine     Referral Reason:   Specialty Services Required     Number of Visits Requested:   1    POCT Urine Drug Screen ([facility])     Interpretive Information:     Negative:  No  drug detected at the cut off level.   Positive:  This result represents presumptive positive for the   tested drug, other substances may yield a positive response other   than the analyte of interest. This result should be utilized for   diagnostic purpose only. Confirmation testing will be performed upon physician request only.         8. Return after MRI for re-evaluation    The total time spent for evaluation and management on 10/09/2024 including reviewing separately obtained history, performing a medically appropriate exam and evaluation, documenting clinical information in the health record, independently interpreting results and communicating them to the patient/family/caregiver, and ordering medications/tests/procedures was between 15-29 minutes.    The above plan and management options were discussed at length with patient. Patient is in agreement with the above and verbalized understanding. It will be communicated with the referring physician via electronic record, fax, or mail.    Kami Hirsch  10/09/2024

## 2024-10-17 ENCOUNTER — TELEPHONE (OUTPATIENT)
Dept: PAIN MEDICINE | Facility: CLINIC | Age: 49
End: 2024-10-17
Payer: COMMERCIAL

## 2024-10-17 NOTE — TELEPHONE ENCOUNTER
I have attempt to call patient at primary contact number with no answer. Voicemail is left instructing patient that her lumbar MRI has been denied by her insurance carrier. Patient's cervical MRI has been approved, also if there are any questions to contact our office at [phone]. 10/17/24@15:25 pm GABRIEL.

## 2024-10-17 NOTE — TELEPHONE ENCOUNTER
----- Message from Kami Hirsch MD sent at 10/17/2024  3:47 PM CDT -----  Have patient to receive the cervical MRIs since it is approved.  If she returns to clinic after the cervical MRI then I will request a lumbar MRI and update medical records  ----- Message -----  From: Shaheed Merino LPN  Sent: 10/17/2024   3:21 PM CDT  To: Kami Hirsch MD    This patient was seen 10/9/2024 and was scheduled for Cervical and Lumbar MRI on 10/30/24. The insurance carrier has approved the Cervical but denied the Lumbar due to results not given for Cervical study.  Is there anything different you would like me to do?    Patient is instructed on Insurances decision on approval for cervical mri and denial of lumbar mri. Patient will keep follow up after Cervical MRI is completed.  Patient voices understanding. TC

## 2024-10-23 ENCOUNTER — CLINICAL SUPPORT (OUTPATIENT)
Dept: REHABILITATION | Facility: HOSPITAL | Age: 49
End: 2024-10-23
Attending: PAIN MEDICINE
Payer: COMMERCIAL

## 2024-10-23 DIAGNOSIS — M54.16 LUMBAR RADICULOPATHY: ICD-10-CM

## 2024-10-23 DIAGNOSIS — M54.12 CERVICAL RADICULOPATHY: Primary | ICD-10-CM

## 2024-10-23 DIAGNOSIS — M54.17 LUMBOSACRAL RADICULOPATHY: ICD-10-CM

## 2024-10-23 PROCEDURE — 97162 PT EVAL MOD COMPLEX 30 MIN: CPT

## 2024-10-23 NOTE — PROGRESS NOTES
See Plan of Care     Sup Visit performed today with PATRICE Gonzalez, PATRICE Corrigan, and PATRICE Hudson.  All goals and treatment plan reviewed. Will work toward completion of all goals set.     First Treatment May Include :     She will likely opt for Aquatics
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
## 2024-10-28 ENCOUNTER — CLINICAL SUPPORT (OUTPATIENT)
Dept: REHABILITATION | Facility: HOSPITAL | Age: 49
End: 2024-10-28
Payer: COMMERCIAL

## 2024-10-28 DIAGNOSIS — M54.12 CERVICAL RADICULOPATHY: Primary | ICD-10-CM

## 2024-10-28 DIAGNOSIS — M54.16 LUMBAR RADICULOPATHY: ICD-10-CM

## 2024-10-28 PROCEDURE — 97113 AQUATIC THERAPY/EXERCISES: CPT | Mod: CQ

## 2024-10-30 ENCOUNTER — HOSPITAL ENCOUNTER (OUTPATIENT)
Dept: RADIOLOGY | Facility: HOSPITAL | Age: 49
Discharge: HOME | End: 2024-10-30
Attending: PAIN MEDICINE
Payer: COMMERCIAL

## 2024-10-30 ENCOUNTER — CLINICAL SUPPORT (OUTPATIENT)
Dept: REHABILITATION | Facility: HOSPITAL | Age: 49
End: 2024-10-30
Payer: COMMERCIAL

## 2024-10-30 DIAGNOSIS — M48.02 SPINAL STENOSIS, CERVICAL REGION: ICD-10-CM

## 2024-10-30 DIAGNOSIS — M54.12 CERVICAL RADICULOPATHY: Primary | ICD-10-CM

## 2024-10-30 DIAGNOSIS — M54.16 LUMBAR RADICULOPATHY: ICD-10-CM

## 2024-10-30 PROCEDURE — 97113 AQUATIC THERAPY/EXERCISES: CPT | Mod: CQ

## 2024-10-30 PROCEDURE — 72141 MRI NECK SPINE W/O DYE: CPT | Mod: TC

## 2024-10-30 PROCEDURE — 72141 MRI NECK SPINE W/O DYE: CPT | Mod: 26,,, | Performed by: RADIOLOGY

## 2024-11-04 DIAGNOSIS — M54.17 LUMBOSACRAL RADICULOPATHY: Primary | ICD-10-CM

## 2024-11-04 RX ORDER — TRAMADOL HYDROCHLORIDE 50 MG/1
50 TABLET ORAL EVERY 12 HOURS PRN
Qty: 30 TABLET | Refills: 0 | Status: SHIPPED | OUTPATIENT
Start: 2024-11-04 | End: 2024-11-05 | Stop reason: SDUPTHER

## 2024-11-04 RX ORDER — ACETAMINOPHEN AND CODEINE PHOSPHATE 300; 30 MG/1; MG/1
1 TABLET ORAL 2 TIMES DAILY PRN
Qty: 30 TABLET | Refills: 0 | OUTPATIENT
Start: 2024-11-04 | End: 2024-11-19

## 2024-11-04 NOTE — TELEPHONE ENCOUNTER
----- Message from Kami Hirsch MD sent at 11/4/2024  1:38 PM CST -----  Regarding: RE: OUT OF MED  Two week supply of Ultram sent to pharmacy.  Patient needs re-evaluation for any additional medication refill  ----- Message -----  From: Shaheed Merino LPN  Sent: 11/4/2024   1:24 PM CST  To: Kami Hirsch MD  Subject: FW: OUT OF MED                                     ----- Message -----  From: Domi Salguero  Sent: 11/4/2024  11:09 AM CST  To: Tor[facility] Staff  Subject: OUT OF MED                                       Pt said her pharmacy told her that her Medicaid ins would only pay for 2 weeks worth of pain med. She got them and is now out. Pharmacy told pt to call to get an alternative pain med such as Ibuprofen. Pt is in extreme pain and wants something called in ASAP to [facility] on [address].  Who Called: Martita Hilliard    Caller is requesting assistance/information from provider's office.        Patient's Preferred Phone Number on File: [phone]   Best Call Back Number, if different:  Additional Information:    Patient has been notified and voices understanding. 11/4/24 AT 3:10 PM TC

## 2024-11-05 DIAGNOSIS — M54.17 LUMBOSACRAL RADICULOPATHY: ICD-10-CM

## 2024-11-05 RX ORDER — TRAMADOL HYDROCHLORIDE 50 MG/1
50 TABLET ORAL EVERY 12 HOURS PRN
Qty: 30 TABLET | Refills: 0 | Status: SHIPPED | OUTPATIENT
Start: 2024-11-05 | End: 2024-11-20

## 2024-11-06 ENCOUNTER — CLINICAL SUPPORT (OUTPATIENT)
Dept: REHABILITATION | Facility: HOSPITAL | Age: 49
End: 2024-11-06
Payer: COMMERCIAL

## 2024-11-06 DIAGNOSIS — M54.16 LUMBAR RADICULOPATHY: ICD-10-CM

## 2024-11-06 DIAGNOSIS — M54.12 CERVICAL RADICULOPATHY: Primary | ICD-10-CM

## 2024-11-06 PROCEDURE — 97113 AQUATIC THERAPY/EXERCISES: CPT | Mod: CQ
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
## 2024-11-18 ENCOUNTER — CLINICAL SUPPORT (OUTPATIENT)
Dept: REHABILITATION | Facility: HOSPITAL | Age: 49
End: 2024-11-18
Payer: COMMERCIAL

## 2024-11-18 DIAGNOSIS — M54.12 CERVICAL RADICULOPATHY: Primary | ICD-10-CM

## 2024-11-18 DIAGNOSIS — M54.16 LUMBAR RADICULOPATHY: ICD-10-CM

## 2024-11-18 PROCEDURE — 97113 AQUATIC THERAPY/EXERCISES: CPT | Mod: CQ
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
## 2024-11-20 ENCOUNTER — CLINICAL SUPPORT (OUTPATIENT)
Dept: REHABILITATION | Facility: HOSPITAL | Age: 49
End: 2024-11-20
Payer: COMMERCIAL

## 2024-11-20 DIAGNOSIS — M54.12 CERVICAL RADICULOPATHY: Primary | ICD-10-CM

## 2024-11-20 DIAGNOSIS — M54.16 LUMBAR RADICULOPATHY: ICD-10-CM

## 2024-11-20 PROCEDURE — 97113 AQUATIC THERAPY/EXERCISES: CPT
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
## 2024-11-22 ENCOUNTER — OFFICE VISIT (OUTPATIENT)
Dept: NEUROLOGY | Facility: CLINIC | Age: 49
End: 2024-11-22
Payer: COMMERCIAL

## 2024-11-22 VITALS
RESPIRATION RATE: 18 BRPM | DIASTOLIC BLOOD PRESSURE: 80 MMHG | WEIGHT: 152 LBS | OXYGEN SATURATION: 100 % | HEART RATE: 78 BPM | HEIGHT: 63 IN | SYSTOLIC BLOOD PRESSURE: 126 MMHG | BODY MASS INDEX: 26.93 KG/M2

## 2024-11-22 DIAGNOSIS — G43.009 MIGRAINE WITHOUT AURA AND WITHOUT STATUS MIGRAINOSUS, NOT INTRACTABLE: Primary | ICD-10-CM

## 2024-11-22 PROCEDURE — 1159F MED LIST DOCD IN RCRD: CPT | Mod: CPTII,,, | Performed by: PSYCHIATRY & NEUROLOGY

## 2024-11-22 PROCEDURE — 99214 OFFICE O/P EST MOD 30 MIN: CPT | Mod: S$PBB,,, | Performed by: PSYCHIATRY & NEUROLOGY

## 2024-11-22 PROCEDURE — 3079F DIAST BP 80-89 MM HG: CPT | Mod: CPTII,,, | Performed by: PSYCHIATRY & NEUROLOGY

## 2024-11-22 PROCEDURE — 99215 OFFICE O/P EST HI 40 MIN: CPT | Mod: PBBFAC | Performed by: PSYCHIATRY & NEUROLOGY

## 2024-11-22 PROCEDURE — 3074F SYST BP LT 130 MM HG: CPT | Mod: CPTII,,, | Performed by: PSYCHIATRY & NEUROLOGY

## 2024-11-22 PROCEDURE — 1160F RVW MEDS BY RX/DR IN RCRD: CPT | Mod: CPTII,,, | Performed by: PSYCHIATRY & NEUROLOGY

## 2024-11-22 PROCEDURE — 3008F BODY MASS INDEX DOCD: CPT | Mod: CPTII,,, | Performed by: PSYCHIATRY & NEUROLOGY

## 2024-11-22 PROCEDURE — 99999 PR PBB SHADOW E&M-EST. PATIENT-LVL V: CPT | Mod: PBBFAC,,, | Performed by: PSYCHIATRY & NEUROLOGY

## 2024-11-22 RX ORDER — TOPIRAMATE 100 MG/1
100 TABLET, FILM COATED ORAL 2 TIMES DAILY
Qty: 180 TABLET | Refills: 3 | Status: SHIPPED | OUTPATIENT
Start: 2024-11-22

## 2024-11-22 RX ORDER — ACETAMINOPHEN AND CODEINE PHOSPHATE 300; 30 MG/1; MG/1
1 TABLET ORAL 2 TIMES DAILY PRN
COMMUNITY
Start: 2024-11-03
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
## 2024-11-25 ENCOUNTER — CLINICAL SUPPORT (OUTPATIENT)
Dept: REHABILITATION | Facility: HOSPITAL | Age: 49
End: 2024-11-25
Payer: COMMERCIAL

## 2024-11-25 DIAGNOSIS — M54.16 LUMBAR RADICULOPATHY: ICD-10-CM

## 2024-11-25 DIAGNOSIS — M54.12 CERVICAL RADICULOPATHY: Primary | ICD-10-CM

## 2024-11-25 PROCEDURE — 97113 AQUATIC THERAPY/EXERCISES: CPT | Mod: CQ
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
## 2024-11-27 ENCOUNTER — CLINICAL SUPPORT (OUTPATIENT)
Dept: REHABILITATION | Facility: HOSPITAL | Age: 49
End: 2024-11-27
Payer: COMMERCIAL

## 2024-11-27 DIAGNOSIS — M54.16 LUMBAR RADICULOPATHY: ICD-10-CM

## 2024-11-27 DIAGNOSIS — M54.12 CERVICAL RADICULOPATHY: Primary | ICD-10-CM

## 2024-11-27 PROCEDURE — 97110 THERAPEUTIC EXERCISES: CPT
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
## 2024-12-04 ENCOUNTER — CLINICAL SUPPORT (OUTPATIENT)
Dept: REHABILITATION | Facility: HOSPITAL | Age: 49
End: 2024-12-04
Payer: COMMERCIAL

## 2024-12-04 DIAGNOSIS — M54.16 LUMBAR RADICULOPATHY: ICD-10-CM

## 2024-12-04 DIAGNOSIS — M54.12 CERVICAL RADICULOPATHY: Primary | ICD-10-CM

## 2024-12-04 PROCEDURE — 97110 THERAPEUTIC EXERCISES: CPT | Mod: CQ
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
## 2024-12-09 ENCOUNTER — OFFICE VISIT (OUTPATIENT)
Dept: PAIN MEDICINE | Facility: CLINIC | Age: 49
End: 2024-12-09
Payer: COMMERCIAL

## 2024-12-09 VITALS
DIASTOLIC BLOOD PRESSURE: 77 MMHG | BODY MASS INDEX: 29.06 KG/M2 | HEIGHT: 63 IN | SYSTOLIC BLOOD PRESSURE: 133 MMHG | WEIGHT: 164 LBS | RESPIRATION RATE: 18 BRPM | HEART RATE: 75 BPM

## 2024-12-09 DIAGNOSIS — M54.16 LUMBAR RADICULOPATHY, CHRONIC: Chronic | ICD-10-CM

## 2024-12-09 DIAGNOSIS — M48.02 SPINAL STENOSIS, CERVICAL REGION: Chronic | ICD-10-CM

## 2024-12-09 DIAGNOSIS — G89.29 CHRONIC RIGHT-SIDED LOW BACK PAIN WITH RIGHT-SIDED SCIATICA: Chronic | ICD-10-CM

## 2024-12-09 DIAGNOSIS — M54.12 CERVICAL RADICULOPATHY: Primary | Chronic | ICD-10-CM

## 2024-12-09 DIAGNOSIS — M54.41 CHRONIC RIGHT-SIDED LOW BACK PAIN WITH RIGHT-SIDED SCIATICA: Chronic | ICD-10-CM

## 2024-12-09 PROCEDURE — 3008F BODY MASS INDEX DOCD: CPT | Mod: CPTII,,, | Performed by: PAIN MEDICINE

## 2024-12-09 PROCEDURE — 3078F DIAST BP <80 MM HG: CPT | Mod: CPTII,,, | Performed by: PAIN MEDICINE

## 2024-12-09 PROCEDURE — 99214 OFFICE O/P EST MOD 30 MIN: CPT | Mod: S$PBB,,, | Performed by: PAIN MEDICINE

## 2024-12-09 PROCEDURE — 99215 OFFICE O/P EST HI 40 MIN: CPT | Mod: PBBFAC | Performed by: PAIN MEDICINE

## 2024-12-09 PROCEDURE — 1159F MED LIST DOCD IN RCRD: CPT | Mod: CPTII,,, | Performed by: PAIN MEDICINE

## 2024-12-09 PROCEDURE — 3075F SYST BP GE 130 - 139MM HG: CPT | Mod: CPTII,,, | Performed by: PAIN MEDICINE

## 2024-12-09 PROCEDURE — 99999 PR PBB SHADOW E&M-EST. PATIENT-LVL V: CPT | Mod: PBBFAC,,, | Performed by: PAIN MEDICINE

## 2024-12-09 NOTE — PATIENT INSTRUCTIONS
HR=79 bpm, VZHG=815/111 mmhg, SpO2=99.0 %, Resp=16 B/min, EtCO2=35 mmHg, Apnea=2 Seconds, Pain=0, Santamaria=2, Comment=NSR, Mariza 9 YOU ARE SCHEDULED ON 01/07/2025 AT 4:00 PM TO HAVE YOUR LUMBAR MRI DONE AT THE [facility] FROM PAIN TREATMENT PARKING LOT.         YOU ARE SCHEDULED ON 01/09/2025 AT 8:00 AM FOR YOUR PROCEDURE- C6-7 YAJAIRA WITH .    HAYDEN CASTREJON FOR 3 DAYS PRIOR TO PROCEDURE.    Procedure Instructions:    Nothing to eat or drink for 8 hours or after midnight including gum, candy, mints, or tobacco products.  If you are scheduled for 1:30 or later nothing to eat or drink after 5 a.m. the morning of the procedure, including gum, candy, mints, or tobacco products.  Must have a  at least 18 yrs of age to stay present at all times  No Diabetic medications the morning of procedure, check blood sugar the morning of procedure, if it is greater than 200 call the office at [phone]  If you are started on antibiotics or have been prescribed antibiotics, have a fever, or have any other type of infection call to reschedule procedure.  If you take blood pressure medications you can take it at your regular scheduled time with a small sip of WATER!  Dentures are to be removed prior to procedure or we can provide you with a denture cup to remove them prior to being taken back for procedure.   False eyelashes are to be removed before the morning of procedure.    Contacts will have to be removed prior to procedure.  No jewelry is to be worn to the procedure.     HOLD ASPIRIN AND ASPIRIN PRODUCTS  (ASPIRIN, BC POWDER ETC. ) FOR 7 DAYS  PRIOR TO PROCEDURE  HOLD NSAIDS( ibuprofen, mobic, meloxicam, advil, diclofenac, naproxen, relafen, celebrex,  methotrexate, aleve etc....)  FOR 3 DAYS   PRIOR TO PROCEDURE

## 2024-12-09 NOTE — PROGRESS NOTES
She Disclaimer: This note has been generated using voice-recognition software. There may be typographical errors that have been missed during proof-reading        Patient ID: Martita Hilliard is a 49 y.o. female.      Chief Complaint: Neck Pain, Arm Pain (right), Low-back Pain, Hip Pain (right), and Leg Pain (bilateral)      49-year-old female returns for re-evaluation of cervical pain, right upper extremity radiculopathy, lower back pain and  bilateral lower extremity radiculopathy.  She completed physical therapy 1 week ago with minimal improvement.  MRI of the cervical spine revealed mild narrowing and osteophyte formation at C3-4 and C6-7.  She was unable to received the MRI of the lumbar spine due to scheduling per radiology.  She developed chronic cervical lower back pain prior to 2009 and was treated conservatively with medication management by Dr. Olivares but never received nerve block injections.  Her pain has progressively worsened over the past 6 months and failed to improve with conservative treatments.  She complains of pain radiating to the arms, hands and fingers.  She notes numbness and tingling of the upper extremities.  She also complains of lower back pain with radicular symptoms to the right lower extremity, buttock and calf.  She has difficulty with balance and requires use of a cane for stability.  Cervical epidural steroid injection was recommended for cervical radiculopathy. MRI of the lumbar spine is indicated for lower extremity radiculopathy and  failed conservative treatment.            Pain Assessment  Pain Assessment: 0-10  Pain Score:   8  Pain Location: Back (neck RUE back right hip and BLE pain)  Pain Descriptors: Constant, Radiating, Sharp, Aching  Pain Frequency: Constant/continuous  Aggravating Factors: Other (Comment) (lifting shoulder, lying on right side, turning over, weather)  Pain Intervention(s): Heat applied, Medication (See eMAR)      A's of Opioid Risk  Assessment  Activity:Patient is unable to  perform ADL.   Analgesia:Patients pain is not controlled by current medication.   Adverse Effects: Patient denies constipation or sedation.  Aberrant Behavior:  reviewed with no aberrant drug seeking/taking behavior.      Patient denies any suicidal or homicidal ideations    Physical Therapy/Home Exercise: yes, completed 1 week ago without improvement    MRI Cervical Spine Without Contrast  Narrative: EXAMINATION:  MRI CERVICAL SPINE WITHOUT CONTRAST    CLINICAL HISTORY:  Myelopathy, chronic, cervical spine;Neck pain, chronic, degenerative changes on xray;Spinal stenosis, cervical; Spinal stenosis, cervical region    TECHNIQUE:  Multiplanar, multisequence MR images of the cervical spine were performed without the administration of contrast.    COMPARISON:  None.    FINDINGS:  Alignment: Normal.    Vertebrae: Normal marrow signal. No fracture.    Discs: Normal height and signal.    Cord: Normal.    Skull base and craniocervical junction: Normal.    Degenerative findings:    C2-C3: There is no focal disc herniation.No significant central canal narrowing.  No significant neural foraminal narrowing.    C3-C4: Posterior marginal osteophytic spur effaces anterior thecal sac.No significant central canal narrowing.  No significant neural foraminal narrowing.    C4-C5: There is no focal disc herniation.No significant central canal narrowing.   Mild right neural foraminal narrowing.    C5-C6: Posterior marginal osteophytic spur effaces anterior thecal sac.Mild central canal narrowing. No significant central canal narrowing.   Mild bilateral neural foraminal narrowing.    C6-C7: Posterior marginal osteophytic spur effaces anterior thecal sac.Mild central canal narrowing.  No significant neural foraminal narrowing.    C7-T1: There is no focal disc herniation.No significant central canal narrowing.  No significant neural foraminal narrowing.    Paraspinal muscles & soft tissues:  Unremarkable.  Impression: Posterior marginal osteophytic disc spur complex at C3-4 and C6-7    Electronically signed by: Chaim Dickinson MD  Date:    10/31/2024  Time:    13:20      Review of Systems   Constitutional: Negative.    HENT: Negative.     Eyes: Negative.    Respiratory: Negative.     Cardiovascular: Negative.    Gastrointestinal: Negative.    Endocrine: Negative.    Genitourinary: Negative.    Musculoskeletal:  Positive for arthralgias, back pain, myalgias, neck pain and neck stiffness.   Integumentary:  Negative.   Neurological:  Positive for weakness (Bilateral upper and lower extremity) and numbness (Bilateral upper and lower extremities).   Hematological: Negative.    Psychiatric/Behavioral:  Positive for sleep disturbance.              Past Medical History:   Diagnosis Date    Bone disease     Degenerative    Chronic sinusitis, unspecified     Crohn's disease     History of herpes genitalis     Hypertension     Pancreatitis     Sciatica      Past Surgical History:   Procedure Laterality Date    CLOSED REDUCTION OF FRACTURE OF NASAL BONE N/A 2024    Procedure: CLOSED REDUCTION, FRACTURE, NASAL BONE;  Surgeon: Karlos Gallegos MD;  Location: [facility];  Service: ENT;  Laterality: N/A;    CYSTOSCOPY N/A 2023    Procedure: CYSTOSCOPY;  Surgeon: Christian Jurado MD;  Location: [facility] OR;  Service: OB/GYN;  Laterality: N/A;    HYSTERECTOMY, TOTAL, LAPAROSCOPIC, WITH SALPINGO-OOPHORECTOMY Bilateral 2023    Procedure: HYSTERECTOMY,TOTAL,LAPAROSCOPIC,WITH SALPINGO-OOPHORECTOMY;  Surgeon: Christian Jurado MD;  Location: [facility];  Service: OB/GYN;  Laterality: Bilateral;    SINUS SURGERY      TONSILLECTOMY AND ADENOIDECTOMY      TUBAL LIGATION       Social History     Socioeconomic History    Marital status: Single   Tobacco Use    Smoking status: Former     Current packs/day: 0.00     Types: Cigarettes     Quit date: 3/1/2023     Years since quittin.7     Passive  exposure: Current    Smokeless tobacco: Never   Substance and Sexual Activity    Alcohol use: Never    Drug use: Not Currently     Types: Marijuana     Comment: stopped 2 months ago    Sexual activity: Not Currently     Partners: Male     Birth control/protection: None     Family History   Problem Relation Name Age of Onset    Hypertension Mother      Hypertension Child       Review of patient's allergies indicates:   Allergen Reactions    Ace inhibitors Swelling    Azithromycin Swelling    Ferrous sulfate Anaphylaxis    Penicillins     Rocephin [ceftriaxone]     Terazol 3 [terconazole] Other (See Comments)     Vaginal irritation and swollen     has a current medication list which includes the following prescription(s): azelastine, diclofenac, paroxetine mesylate(menop.sym), prednisone, topiramate, acetaminophen-codeine 300-30mg, gabapentin, and [DISCONTINUED] lisinopril-hydrochlorothiazide.      Objective:  Vitals:    12/09/24 1333   BP: 133/77   Pulse: 75   Resp: 18        Physical Exam  Vitals and nursing note reviewed.   Constitutional:       General: She is not in acute distress.     Appearance: Normal appearance. She is not ill-appearing, toxic-appearing or diaphoretic.   HENT:      Head: Normocephalic and atraumatic.      Nose: Nose normal.      Mouth/Throat:      Mouth: Mucous membranes are moist.   Eyes:      Extraocular Movements: Extraocular movements intact.      Pupils: Pupils are equal, round, and reactive to light.   Cardiovascular:      Rate and Rhythm: Normal rate and regular rhythm.      Heart sounds: Normal heart sounds.   Pulmonary:      Effort: Pulmonary effort is normal. No respiratory distress.      Breath sounds: Normal breath sounds. No stridor. No wheezing or rhonchi.   Abdominal:      General: Bowel sounds are normal.      Palpations: Abdomen is soft.   Musculoskeletal:         General: No swelling or deformity.      Cervical back: Tenderness present. No spasms. Pain with movement present.  Decreased range of motion.      Thoracic back: Normal.      Lumbar back: Tenderness and bony tenderness present. No spasms. Decreased range of motion. Negative right straight leg raise test and negative left straight leg raise test. No scoliosis.      Right lower leg: No edema.      Left lower leg: No edema.      Comments: Cervical pain with flexion, extension lateral rotation.  Bilateral cervical facet tenderness to palpation.  Lumbar pain with flexion, extension lateral rotation.  Bilateral facet and SI joint tenderness to palpation   Skin:     General: Skin is warm.   Neurological:      General: No focal deficit present.      Mental Status: She is alert and oriented to person, place, and time. Mental status is at baseline.      Cranial Nerves: No cranial nerve deficit.      Sensory: Sensation is intact. No sensory deficit.      Motor: No weakness.      Coordination: Coordination normal.      Gait: Gait abnormal (Uses a cane).      Deep Tendon Reflexes:      Reflex Scores:       Tricep reflexes are 1+ on the right side and 1+ on the left side.       Bicep reflexes are 1+ on the right side and 1+ on the left side.       Patellar reflexes are 1+ on the right side and 1+ on the left side.       Achilles reflexes are 1+ on the right side and 1+ on the left side.  Psychiatric:         Mood and Affect: Mood normal.         Behavior: Behavior normal.           Assessment:      1. Cervical radiculopathy    2. Chronic right-sided low back pain with right-sided sciatica    3. Spinal stenosis, cervical region    4. Lumbar radiculopathy, chronic          Plan:  1. reviewed  2 Schedule C6-7 epidural steroid epidurogram under fluoroscopy  Orders Placed This Encounter   Procedures    Case Request Operating Room: C6-7 YAJAIRA and epiduralgram under fluoroscopy     Order Specific Question:   Medical Necessity:     Answer:   Medically Non-Urgent [100]     Order Specific Question:   Case classification     Answer:   E - Elective [90]      Order Specific Question:   Positioning:     Answer:   Prone [1003]     Order Specific Question:   Post-Procedure Disposition:     Answer:   PACU [1]     Order Specific Question:   Estimated Length of Stay:     Answer:   0 midnight     Order Specific Question:   Implant Required:     Answer:   No [1001]     Order Specific Question:   Is an on-site pathologist required for this procedure?     Answer:   N/A      3.Indications for this procedure for this specific patient include the following   -History, physical examination and concordant radiological image based diagnostic testing supports medical necessity for an epidural steroid injection    - Pt has been in pain for at least 6 weeks and has failed conservative care (e.g. Exercise, physical methods, NSAID/ and or muscle relaxants)   - Pt has no major risk factors for spinal cancer or contraindicated condition   - Neurogenic claudication and Radicular pain are interfering with functional activity  - Pain is associated with symptoms of nerve root irritation   - Any numbness documented is accompanied with paresthesia   - No evidence of systemic or local infection, bleeding tendency or unstable medical condition   - Epidural provided as part of a comprehensive pain management program  - All repeat injections have at least 80% pain relief and increase functional gain and physical activity, and reduction in reliance on the use of medication and or physical therapy  - Pt has significant functional limitation resulting in diminished quality of life and impaired age appropriate ADL's.   - Diagnostic evaluation has ruled out other causes of pain  - Pt participating in an active rehabilitation program or home exercise program which has been discussed with the patient including heat ice and rest  - No more than 3 epidurals will be done in a 6 month period at the same level with at least 7 days between injections  - MAC is only offered in cases of needle phobia   - Injection  done at C6-7 level which is consistent with patient's dermatomal pain complaint    4.Monitored Anesthesia Care medical necessity authorization request:    Monitor anesthesia request is medically indicated for the scheduled nerve block procedure due to:  - needle phobia and anxiety, placing  the patient at risk during the provided service.  -patient has an ASA class greater than 3 and requires constant presence of an anesthesiologist during the procedure:  -patient has severe problems with muscles and muscle spasticity that makes it hard to lie still  -patient suffers from chronic pain and is unable to function due to  diminished ADLs    5.The planned medically necessary  surgical procedure is performed in a hospital outpatient department and not in an ambulatory surgical center due to:     -there is no geographically assessable ambulatory surgery center that has the  necessary equipment and fluoroscopy needed for the procedure     -there is no geographically assessable ambulatory surgical center available at which the physician has privileges     -an ASC's  specific  guideline regarding the individuals weight or health conditions that prevent the use of an ASC       -injections must be performed under fluoroscopy image guidance with contrast unless the patient has a documented contrast allergy or pregnancy        report:  Reviewed and consistent with medication use as prescribed.      The total time spent for evaluation and management on 12/10/2024 including reviewing separately obtained history, performing a medically appropriate exam and evaluation, documenting clinical information in the health record, independently interpreting results and communicating them to the patient/family/caregiver, and ordering medications/tests/procedures was between 15-29 minutes.    The above plan and management options were discussed at length with patient. Patient is in agreement with the above and verbalized understanding. It  will be communicated with the referring physician via electronic record, fax, or mail.

## 2025-01-02 ENCOUNTER — HOSPITAL ENCOUNTER (EMERGENCY)
Facility: HOSPITAL | Age: 50
Discharge: HOME | End: 2025-01-02
Attending: EMERGENCY MEDICINE
Payer: COMMERCIAL

## 2025-01-02 VITALS
OXYGEN SATURATION: 99 % | DIASTOLIC BLOOD PRESSURE: 78 MMHG | TEMPERATURE: 98 F | BODY MASS INDEX: 28.35 KG/M2 | RESPIRATION RATE: 17 BRPM | HEIGHT: 63 IN | HEART RATE: 93 BPM | WEIGHT: 160 LBS | SYSTOLIC BLOOD PRESSURE: 112 MMHG

## 2025-01-02 DIAGNOSIS — J20.9 ACUTE BRONCHITIS, UNSPECIFIED ORGANISM: ICD-10-CM

## 2025-01-02 DIAGNOSIS — J06.9 VIRAL URI WITH COUGH: Primary | ICD-10-CM

## 2025-01-02 DIAGNOSIS — R07.9 CHEST PAIN: ICD-10-CM

## 2025-01-02 PROCEDURE — 93010 ELECTROCARDIOGRAM REPORT: CPT | Mod: ,,, | Performed by: INTERNAL MEDICINE

## 2025-01-02 PROCEDURE — 99284 EMERGENCY DEPT VISIT MOD MDM: CPT | Mod: 25

## 2025-01-02 PROCEDURE — 93005 ELECTROCARDIOGRAM TRACING: CPT

## 2025-01-02 RX ORDER — DOXYCYCLINE 100 MG/1
100 CAPSULE ORAL 2 TIMES DAILY
Qty: 20 CAPSULE | Refills: 0 | Status: SHIPPED | OUTPATIENT
Start: 2025-01-02 | End: 2025-01-12

## 2025-01-02 RX ORDER — BENZONATATE 100 MG/1
100 CAPSULE ORAL 3 TIMES DAILY PRN
Qty: 20 CAPSULE | Refills: 0 | Status: SHIPPED | OUTPATIENT
Start: 2025-01-02 | End: 2025-01-12

## 2025-01-02 RX ORDER — BENZONATATE 100 MG/1
100 CAPSULE ORAL 3 TIMES DAILY PRN
Qty: 20 CAPSULE | Refills: 0 | Status: SHIPPED | OUTPATIENT
Start: 2025-01-02 | End: 2025-01-02

## 2025-01-02 RX ORDER — DOXYCYCLINE 100 MG/1
100 CAPSULE ORAL 2 TIMES DAILY
Qty: 20 CAPSULE | Refills: 0 | Status: SHIPPED | OUTPATIENT
Start: 2025-01-02 | End: 2025-01-02

## 2025-01-02 NOTE — ED PROVIDER NOTES
Encounter Date: 1/2/2025       History     Chief Complaint   Patient presents with    Shortness of Breath     Patient states shortness of breath and cough that started after sitting with family member in ICU in [place] with pneumonia. Patient states staff at [facility] reported being sick    Cough     Patient is a 49-year-old female who presents to the emergency department complaining of 3 day history of cough, fever, headache, muscle aches and pains, nausea, vomiting, diarrhea.  Patient states that prior to symptoms starting she has been in the hospital with a patient who was admitted for pneumonia.  Patient states she feels short of breath during coughing spells but not during the rest of the time.  Patient has some pain in her chest associated with coughing but not the rest of the time.  Patient denies any other acute problems or complaints at this time.        Review of patient's allergies indicates:   Allergen Reactions    Ace inhibitors Swelling    Azithromycin Swelling    Ferrous sulfate Anaphylaxis    Penicillins     Rocephin [ceftriaxone]     Terazol 3 [terconazole] Other (See Comments)     Vaginal irritation and swollen     Past Medical History:   Diagnosis Date    Bone disease     Degenerative    Chronic sinusitis, unspecified     Crohn's disease     History of herpes genitalis     Hypertension     Pancreatitis     Sciatica      Past Surgical History:   Procedure Laterality Date    CLOSED REDUCTION OF FRACTURE OF NASAL BONE N/A 2/20/2024    Procedure: CLOSED REDUCTION, FRACTURE, NASAL BONE;  Surgeon: Karlos Gallegos MD;  Location: [facility] OR;  Service: ENT;  Laterality: N/A;    CYSTOSCOPY N/A 6/28/2023    Procedure: CYSTOSCOPY;  Surgeon: Christian Jurado MD;  Location: [facility] OR;  Service: OB/GYN;  Laterality: N/A;    HYSTERECTOMY, TOTAL, LAPAROSCOPIC, WITH SALPINGO-OOPHORECTOMY Bilateral 6/28/2023    Procedure: HYSTERECTOMY,TOTAL,LAPAROSCOPIC,WITH SALPINGO-OOPHORECTOMY;  Surgeon:  Christian Jurado MD;  Location: [facility];  Service: OB/GYN;  Laterality: Bilateral;    SINUS SURGERY      TONSILLECTOMY AND ADENOIDECTOMY      TUBAL LIGATION       Family History   Problem Relation Name Age of Onset    Hypertension Mother      Hypertension Child       Social History     Tobacco Use    Smoking status: Former     Current packs/day: 0.00     Types: Cigarettes     Quit date: 3/1/2023     Years since quittin.8     Passive exposure: Current    Smokeless tobacco: Never   Substance Use Topics    Alcohol use: Never    Drug use: Not Currently     Types: Marijuana     Comment: stopped 2 months ago     Review of Systems   Respiratory:  Positive for cough.         Shortness breath only during coughing spells.   Cardiovascular:  Positive for chest pain.        Chest pain associated with cough.   All other systems reviewed and are negative.      Physical Exam     Initial Vitals   BP Pulse Resp Temp SpO2   25 0745 25 0733 25 0733 25 0733 25 0733   112/78 93 17 98.4 °F (36.9 °C) 99 %      MAP       --                Physical Exam    Nursing note and vitals reviewed.  Constitutional: She appears well-developed and well-nourished.   HENT:   Head: Normocephalic.   Eyes: Pupils are equal, round, and reactive to light.   Cardiovascular:  Normal rate.           Pulmonary/Chest: Breath sounds normal. She has no wheezes. She has no rales.   Abdominal: Abdomen is soft.   Musculoskeletal:         General: Normal range of motion.     Neurological: She is alert.   Skin: Skin is warm. Capillary refill takes less than 2 seconds.   Psychiatric: She has a normal mood and affect.         Medical Screening Exam   See Full Note    ED Course   Procedures  Labs Reviewed - No data to display       Imaging Results    None          Medications - No data to display  Medical Decision Making             ED Course as of 25 0755   u 2025   0755 Medical decision-making:  Differential  diagnosis includes URI, viral URI, influenza, COVID-19, acute bronchitis, pneumonia.  EKG by my interpretation shows sinus rhythm, rate of 85, no acute ST segment changes. [BB]      ED Course User Index  [BB] Jeff Cheney MD                           Clinical Impression:   Final diagnoses:  [J06.9] Viral URI with cough (Primary)  [J20.9] Acute bronchitis, unspecified organism        ED Disposition Condition    Discharge Stable          ED Prescriptions       Medication Sig Dispense Start Date End Date Auth. Provider    doxycycline (VIBRAMYCIN) 100 MG Cap Take 1 capsule (100 mg total) by mouth 2 (two) times daily. for 10 days 20 capsule 1/2/2025 1/12/2025 Jeff Cheney MD    benzonatate (TESSALON) 100 MG capsule Take 1 capsule (100 mg total) by mouth 3 (three) times daily as needed for Cough. 20 capsule 1/2/2025 1/12/2025 Jeff Cheney MD          Follow-up Information    None          Jeff Cheney MD  01/02/25 0751

## 2025-01-02 NOTE — DISCHARGE INSTRUCTIONS
Take medication as prescribed.  Try NyQuil at night and DayQuil during the daytime.  Return to emergency department for any worsening or further problems.  Follow up in clinic with primary care provider in 2-3 days if symptoms persist.

## 2025-01-06 ENCOUNTER — TELEPHONE (OUTPATIENT)
Dept: PAIN MEDICINE | Facility: CLINIC | Age: 50
End: 2025-01-06
Payer: COMMERCIAL

## 2025-01-06 LAB
OHS QRS DURATION: 74 MS
OHS QTC CALCULATION: 432 MS

## 2025-01-06 NOTE — TELEPHONE ENCOUNTER
I have spoken to patient and instructed her that her Mri scheduled for 01/07/2025 has been denied per her insurance carrier. Patient voices understanding. 01/06/2025@08:18 am GABRIEL.

## 2025-01-06 NOTE — TELEPHONE ENCOUNTER
Patient has notified our office that she currently has the Flu and is currently on medication. C6-7 YAJAIRA has been removed from schedule 01/09/2025 until symptoms are resolved.

## 2025-01-22 ENCOUNTER — TELEPHONE (OUTPATIENT)
Dept: PAIN MEDICINE | Facility: CLINIC | Age: 50
End: 2025-01-22
Payer: COMMERCIAL

## 2025-01-22 NOTE — TELEPHONE ENCOUNTER
C6-7 YAJAIRA is rescheduled on 01/30/2025 at 1:30 PM. Patient is notified and voices understanding. 01/22/2025@11:50 am TC.  ----- Message from Maura sent at 1/17/2025 10:24 AM CST -----  Regarding: procedure update  Who Called: Martita Hilliard    Preferred Method of Contact: Phone Call  Patient's Preferred Phone Number on File: [phone]     Additional Information:  Martita is needing an update on procedure that was rescheduled due to death in the family. I see a rescheduled appointment but wasn't sure if this was the surgery. She is needing a refill on her pain medication as well.  Could not find pain medication Tramadol on list of medications. This is what she states she is given.

## 2025-01-27 ENCOUNTER — OFFICE VISIT (OUTPATIENT)
Dept: PAIN MEDICINE | Facility: CLINIC | Age: 50
End: 2025-01-27
Payer: COMMERCIAL

## 2025-01-27 VITALS
RESPIRATION RATE: 18 BRPM | DIASTOLIC BLOOD PRESSURE: 61 MMHG | SYSTOLIC BLOOD PRESSURE: 131 MMHG | HEART RATE: 72 BPM | WEIGHT: 162 LBS | BODY MASS INDEX: 28.7 KG/M2 | HEIGHT: 63 IN

## 2025-01-27 DIAGNOSIS — G89.29 CHRONIC RIGHT-SIDED LOW BACK PAIN WITH RIGHT-SIDED SCIATICA: Chronic | ICD-10-CM

## 2025-01-27 DIAGNOSIS — M54.41 CHRONIC RIGHT-SIDED LOW BACK PAIN WITH RIGHT-SIDED SCIATICA: Chronic | ICD-10-CM

## 2025-01-27 DIAGNOSIS — M48.02 SPINAL STENOSIS, CERVICAL REGION: Chronic | ICD-10-CM

## 2025-01-27 DIAGNOSIS — M54.12 CERVICAL RADICULOPATHY: Primary | Chronic | ICD-10-CM

## 2025-01-27 PROCEDURE — 1159F MED LIST DOCD IN RCRD: CPT | Mod: CPTII,,, | Performed by: PAIN MEDICINE

## 2025-01-27 PROCEDURE — 3008F BODY MASS INDEX DOCD: CPT | Mod: CPTII,,, | Performed by: PAIN MEDICINE

## 2025-01-27 PROCEDURE — 99214 OFFICE O/P EST MOD 30 MIN: CPT | Mod: PBBFAC | Performed by: PAIN MEDICINE

## 2025-01-27 PROCEDURE — 3075F SYST BP GE 130 - 139MM HG: CPT | Mod: CPTII,,, | Performed by: PAIN MEDICINE

## 2025-01-27 PROCEDURE — 99214 OFFICE O/P EST MOD 30 MIN: CPT | Mod: S$PBB,,, | Performed by: PAIN MEDICINE

## 2025-01-27 PROCEDURE — 99999 PR PBB SHADOW E&M-EST. PATIENT-LVL IV: CPT | Mod: PBBFAC,,, | Performed by: PAIN MEDICINE

## 2025-01-27 PROCEDURE — 3078F DIAST BP <80 MM HG: CPT | Mod: CPTII,,, | Performed by: PAIN MEDICINE

## 2025-01-27 RX ORDER — IBUPROFEN 800 MG/1
800 TABLET ORAL 3 TIMES DAILY
Qty: 90 TABLET | Refills: 0 | Status: SHIPPED | OUTPATIENT
Start: 2025-01-27

## 2025-01-27 RX ORDER — GABAPENTIN 300 MG/1
300 CAPSULE ORAL 3 TIMES DAILY
Qty: 90 CAPSULE | Refills: 0 | Status: SHIPPED | OUTPATIENT
Start: 2025-01-27 | End: 2025-02-26

## 2025-01-27 RX ORDER — HYDROCODONE BITARTRATE AND ACETAMINOPHEN 10; 325 MG/1; MG/1
1 TABLET ORAL EVERY 8 HOURS PRN
Qty: 30 TABLET | Refills: 0 | Status: SHIPPED | OUTPATIENT
Start: 2025-01-27 | End: 2025-02-11

## 2025-01-27 NOTE — PROGRESS NOTES
She Disclaimer: This note has been generated using voice-recognition software. There may be typographical errors that have been missed during proof-reading        Patient ID: Martita Hilliard is a 49 y.o. female.      Chief Complaint: Neck Pain (Patient is continuing to have right sided neck and right shoulder pain.)      49-year-old female returns for re-evaluation of cervical radiculopathy.  She canceled  the cervical epidural steroid injection due to a tooth abscess and is awaiting a dental evaluation today.  Her pain is severe and radiates to the bilateral upper extremities.  Physical therapy failed to provide  improvement.  MRI of the cervical spine revealed osteophyte formation at C3-4, C6-7 and cervical narrowing.  Her pain has progressively worsened without  medication management.  She complains of radicular symptoms to the arms, hands and fingers. She has numbness and tingling of the upper extremities.  She returns today for re-evaluation and medication management.            Pain Assessment  Pain Assessment: 0-10  Pain Score: 10-Worst pain ever  Pain Location: Neck  Pain Orientation: Right  Pain Descriptors: Aching, Constant, Throbbing  Pain Frequency: Continuous  Pain Onset: On-going  Clinical Progression: Gradually worsening  Aggravating Factors: Bending, Stretching, Exercise, Kneeling, Squatting, Standing, Walking  Pain Intervention(s): Medication (See eMAR), Rest      A's of Opioid Risk Assessment  Activity:Patient is unable to perform ADL.   Analgesia:Patients pain is not controlled by current medication.   Adverse Effects: Patient denies constipation or sedation.  Aberrant Behavior:  reviewed with no aberrant drug seeking/taking behavior.      Patient denies any suicidal or homicidal ideations    Physical Therapy/Home Exercise:  Yes, without improvement     MRI Cervical Spine Without Contrast  Narrative: EXAMINATION:  MRI CERVICAL SPINE WITHOUT CONTRAST    CLINICAL HISTORY:  Myelopathy, chronic,  cervical spine;Neck pain, chronic, degenerative changes on xray;Spinal stenosis, cervical; Spinal stenosis, cervical region    TECHNIQUE:  Multiplanar, multisequence MR images of the cervical spine were performed without the administration of contrast.    COMPARISON:  None.    FINDINGS:  Alignment: Normal.    Vertebrae: Normal marrow signal. No fracture.    Discs: Normal height and signal.    Cord: Normal.    Skull base and craniocervical junction: Normal.    Degenerative findings:    C2-C3: There is no focal disc herniation.No significant central canal narrowing.  No significant neural foraminal narrowing.    C3-C4: Posterior marginal osteophytic spur effaces anterior thecal sac.No significant central canal narrowing.  No significant neural foraminal narrowing.    C4-C5: There is no focal disc herniation.No significant central canal narrowing.   Mild right neural foraminal narrowing.    C5-C6: Posterior marginal osteophytic spur effaces anterior thecal sac.Mild central canal narrowing. No significant central canal narrowing.   Mild bilateral neural foraminal narrowing.    C6-C7: Posterior marginal osteophytic spur effaces anterior thecal sac.Mild central canal narrowing.  No significant neural foraminal narrowing.    C7-T1: There is no focal disc herniation.No significant central canal narrowing.  No significant neural foraminal narrowing.    Paraspinal muscles & soft tissues: Unremarkable.  Impression: Posterior marginal osteophytic disc spur complex at C3-4 and C6-7    Electronically signed by: Chaim Dickinson MD  Date:    10/31/2024  Time:    13:20      Review of Systems   Constitutional: Negative.    HENT:  Positive for dental problem.    Eyes: Negative.    Respiratory: Negative.     Cardiovascular: Negative.    Gastrointestinal: Negative.    Endocrine: Negative.    Genitourinary: Negative.    Musculoskeletal:  Positive for arthralgias, back pain, neck pain and neck stiffness.   Integumentary:  Negative.    Neurological:  Positive for weakness (Bilateral upper extremities) and numbness (Bilateral upper extremities).   Hematological: Negative.    Psychiatric/Behavioral: Negative.               Past Medical History:   Diagnosis Date    Bone disease     Degenerative    Chronic sinusitis, unspecified     Crohn's disease     History of herpes genitalis     Hypertension     Pancreatitis     Sciatica      Past Surgical History:   Procedure Laterality Date    CLOSED REDUCTION OF FRACTURE OF NASAL BONE N/A 2024    Procedure: CLOSED REDUCTION, FRACTURE, NASAL BONE;  Surgeon: Karlos Gallegos MD;  Location: [facility] OR;  Service: ENT;  Laterality: N/A;    CYSTOSCOPY N/A 2023    Procedure: CYSTOSCOPY;  Surgeon: Christian Jurado MD;  Location: [facility] OR;  Service: OB/GYN;  Laterality: N/A;    HYSTERECTOMY, TOTAL, LAPAROSCOPIC, WITH SALPINGO-OOPHORECTOMY Bilateral 2023    Procedure: HYSTERECTOMY,TOTAL,LAPAROSCOPIC,WITH SALPINGO-OOPHORECTOMY;  Surgeon: Christian Jurado MD;  Location: [facility];  Service: OB/GYN;  Laterality: Bilateral;    SINUS SURGERY      TONSILLECTOMY AND ADENOIDECTOMY      TUBAL LIGATION       Social History     Socioeconomic History    Marital status: Single   Tobacco Use    Smoking status: Former     Current packs/day: 0.00     Types: Cigarettes     Quit date: 3/1/2023     Years since quittin.9     Passive exposure: Current    Smokeless tobacco: Never   Substance and Sexual Activity    Alcohol use: Never    Drug use: Not Currently     Types: Marijuana     Comment: stopped 2 months ago    Sexual activity: Not Currently     Partners: Male     Birth control/protection: None     Family History   Problem Relation Name Age of Onset    Hypertension Mother      Hypertension Child       Review of patient's allergies indicates:   Allergen Reactions    Ace inhibitors Swelling    Azithromycin Swelling    Ferrous sulfate Anaphylaxis    Penicillins     Rocephin [ceftriaxone]      Terazol 3 [terconazole] Other (See Comments)     Vaginal irritation and swollen     has a current medication list which includes the following prescription(s): acetaminophen-codeine 300-30mg, azelastine, diclofenac, gabapentin, hydrocodone-acetaminophen, ibuprofen, paroxetine mesylate(menop.sym), prednisone, topiramate, and [DISCONTINUED] lisinopril-hydrochlorothiazide.      Objective:  Vitals:    01/27/25 1112   BP: 131/61   Pulse: 72   Resp: 18        Physical Exam  Vitals and nursing note reviewed.   Constitutional:       General: She is not in acute distress.     Appearance: Normal appearance. She is not ill-appearing, toxic-appearing or diaphoretic.   HENT:      Head: Normocephalic and atraumatic.      Nose: Nose normal.      Mouth/Throat:      Mouth: Mucous membranes are moist.      Dentition: Dental abscesses present.   Eyes:      Extraocular Movements: Extraocular movements intact.      Pupils: Pupils are equal, round, and reactive to light.   Cardiovascular:      Rate and Rhythm: Normal rate and regular rhythm.      Heart sounds: Normal heart sounds.   Pulmonary:      Effort: Pulmonary effort is normal. No respiratory distress.      Breath sounds: Normal breath sounds. No stridor. No wheezing or rhonchi.   Abdominal:      General: Bowel sounds are normal.      Palpations: Abdomen is soft.   Musculoskeletal:         General: No swelling or deformity.      Cervical back: Normal range of motion. Tenderness present. No spasms. Pain with movement present. Normal range of motion.      Thoracic back: Normal.      Lumbar back: Tenderness present. No spasms or bony tenderness. Decreased range of motion. Negative right straight leg raise test and negative left straight leg raise test. No scoliosis.      Right lower leg: No edema.      Left lower leg: No edema.      Comments: Cervical pain with flexion, extension and lateral rotation.  Bilateral cervical facet tenderness to palpation.  Lumbar pain with flexion  extension rotation.   Skin:     General: Skin is warm.   Neurological:      General: No focal deficit present.      Mental Status: She is alert and oriented to person, place, and time. Mental status is at baseline.      Cranial Nerves: No cranial nerve deficit.      Sensory: Sensation is intact. No sensory deficit.      Motor: No weakness.      Coordination: Coordination normal.      Gait: Gait normal.      Deep Tendon Reflexes: Reflexes are normal and symmetric.   Psychiatric:         Mood and Affect: Mood normal.         Behavior: Behavior normal.           Assessment:      1. Cervical radiculopathy    2. Chronic right-sided low back pain with right-sided sciatica    3. Spinal stenosis, cervical region          Plan:  1. reviewed  2.Addiction, Dependency, Tolerance, Opioid abuse-misuse, Death, Diversion Discussed. Overdose reversal drug Naloxone discussed. Patient is prescribed opiates for chronic nonmalignant pain pathology.  Patient is receiving opiates which require greater than a 72 hour supply of therapy.  Patient was educated on potential dependency associated with long-term opioid use as well as decreasing efficacy with prolonged use.  Patient was advised of risks, benefits and side effects and how to utilize each medication.  Patient was also informed that any deviation from therapy protocol will  lead to discontinuation of opiates.  It is reasonable to prescribe opioid analgesics for patient based on positive response to opioid medications, lack of side effects and  limited aberrant behavior.    3. Patient prescribed 2 weeks of Norco for right tooth abscess and cervical radiculopathy       Requested Prescriptions     Signed Prescriptions Disp Refills    HYDROcodone-acetaminophen (NORCO)  mg per tablet 30 tablet 0     Sig: Take 1 tablet by mouth every 8 (eight) hours as needed for Pain.    ibuprofen (ADVIL,MOTRIN) 800 MG tablet 90 tablet 0     Sig: Take 1 tablet (800 mg total) by mouth 3 (three)  times daily.    gabapentin (NEURONTIN) 300 MG capsule 90 capsule 0     Sig: Take 1 capsule (300 mg total) by mouth 3 (three) times daily.     4. Return in 2 weeks for re-evaluation   5. We will reschedule cervical epidural steroid injection after abscess resolves     report:  Reviewed and consistent with medication use as prescribed.      The total time spent for evaluation and management on 01/27/2025 including reviewing separately obtained history, performing a medically appropriate exam and evaluation, documenting clinical information in the health record, independently interpreting results and communicating them to the patient/family/caregiver, and ordering medications/tests/procedures was between 15-29 minutes.    The above plan and management options were discussed at length with patient. Patient is in agreement with the above and verbalized understanding. It will be communicated with the referring physician via electronic record, fax, or mail.

## 2025-02-18 RX ORDER — GABAPENTIN 300 MG/1
300 CAPSULE ORAL 3 TIMES DAILY
Qty: 90 CAPSULE | Refills: 0 | OUTPATIENT
Start: 2025-02-18 | End: 2025-03-20

## 2025-03-11 ENCOUNTER — HOSPITAL ENCOUNTER (EMERGENCY)
Facility: HOSPITAL | Age: 50
Discharge: HOME | End: 2025-03-11
Attending: FAMILY MEDICINE
Payer: COMMERCIAL

## 2025-03-11 VITALS
OXYGEN SATURATION: 99 % | DIASTOLIC BLOOD PRESSURE: 87 MMHG | WEIGHT: 158 LBS | SYSTOLIC BLOOD PRESSURE: 173 MMHG | RESPIRATION RATE: 18 BRPM | HEART RATE: 76 BPM | BODY MASS INDEX: 28 KG/M2 | HEIGHT: 63 IN | TEMPERATURE: 99 F

## 2025-03-11 DIAGNOSIS — R53.83 FATIGUE, UNSPECIFIED TYPE: Primary | ICD-10-CM

## 2025-03-11 LAB
INFLUENZA A MOLECULAR (OHS): NEGATIVE
INFLUENZA B MOLECULAR (OHS): NEGATIVE
SARS-COV-2 RDRP RESP QL NAA+PROBE: NEGATIVE

## 2025-03-11 PROCEDURE — 99284 EMERGENCY DEPT VISIT MOD MDM: CPT | Mod: 25

## 2025-03-11 PROCEDURE — 87635 SARS-COV-2 COVID-19 AMP PRB: CPT | Performed by: FAMILY MEDICINE

## 2025-03-11 PROCEDURE — 63600175 PHARM REV CODE 636 W HCPCS: Mod: JZ,TB | Performed by: FAMILY MEDICINE

## 2025-03-11 PROCEDURE — 96372 THER/PROPH/DIAG INJ SC/IM: CPT | Performed by: FAMILY MEDICINE

## 2025-03-11 PROCEDURE — 87502 INFLUENZA DNA AMP PROBE: CPT | Performed by: FAMILY MEDICINE

## 2025-03-11 RX ORDER — KETOROLAC TROMETHAMINE 30 MG/ML
60 INJECTION, SOLUTION INTRAMUSCULAR; INTRAVENOUS
Status: COMPLETED | OUTPATIENT
Start: 2025-03-11 | End: 2025-03-11

## 2025-03-11 RX ORDER — DEXAMETHASONE SODIUM PHOSPHATE 4 MG/ML
4 INJECTION, SOLUTION INTRA-ARTICULAR; INTRALESIONAL; INTRAMUSCULAR; INTRAVENOUS; SOFT TISSUE
Status: COMPLETED | OUTPATIENT
Start: 2025-03-11 | End: 2025-03-11

## 2025-03-11 RX ORDER — PROMETHAZINE HYDROCHLORIDE 25 MG/ML
25 INJECTION, SOLUTION INTRAMUSCULAR; INTRAVENOUS
Status: COMPLETED | OUTPATIENT
Start: 2025-03-11 | End: 2025-03-11

## 2025-03-11 RX ORDER — CLINDAMYCIN HYDROCHLORIDE 150 MG/1
300 CAPSULE ORAL 4 TIMES DAILY
Qty: 56 CAPSULE | Refills: 0 | Status: SHIPPED | OUTPATIENT
Start: 2025-03-11 | End: 2025-03-19

## 2025-03-11 RX ORDER — METHYLPREDNISOLONE ACETATE 80 MG/ML
80 INJECTION, SUSPENSION INTRA-ARTICULAR; INTRALESIONAL; INTRAMUSCULAR; SOFT TISSUE
Status: COMPLETED | OUTPATIENT
Start: 2025-03-11 | End: 2025-03-11

## 2025-03-11 RX ADMIN — DEXAMETHASONE SODIUM PHOSPHATE 4 MG: 4 INJECTION, SOLUTION INTRA-ARTICULAR; INTRALESIONAL; INTRAMUSCULAR; INTRAVENOUS; SOFT TISSUE at 04:03

## 2025-03-11 RX ADMIN — PROMETHAZINE HYDROCHLORIDE 25 MG: 25 INJECTION INTRAMUSCULAR; INTRAVENOUS at 04:03

## 2025-03-11 RX ADMIN — METHYLPREDNISOLONE ACETATE 80 MG: 80 INJECTION, SUSPENSION INTRA-ARTICULAR; INTRALESIONAL; INTRAMUSCULAR; SOFT TISSUE at 04:03

## 2025-03-11 RX ADMIN — KETOROLAC TROMETHAMINE 60 MG: 30 INJECTION, SOLUTION INTRAMUSCULAR at 04:03

## 2025-03-11 NOTE — ED PROVIDER NOTES
Encounter Date: 3/11/2025       History     Chief Complaint   Patient presents with    General Illness     Pt presents to ED via POV with c/o feeling sick since middle of February. Pt was dx with flu then.      Ms. Hilliard is a 49 y.o. female with extensive past medical history who presents to ED with flu-like symptoms which began in January. She was diagnosed with viral URI in ED on 1/2 and states she never got better. She reports joint pain, muscle aches, fever, dizziness, nausea, vomiting, cough, and shortness of breath have been ongoing.     She is seen by Dr. Hirsch with pain management for spinal stenosis and cervical radiculopathy.     The history is provided by the patient.     Review of patient's allergies indicates:   Allergen Reactions    Ace inhibitors Swelling    Azithromycin Swelling    Ferrous sulfate Anaphylaxis    Penicillins     Rocephin [ceftriaxone]     Terazol 3 [terconazole] Other (See Comments)     Vaginal irritation and swollen     Past Medical History:   Diagnosis Date    Bone disease     Degenerative    Chronic sinusitis, unspecified     Crohn's disease     History of herpes genitalis     Hypertension     Pancreatitis     Sciatica      Past Surgical History:   Procedure Laterality Date    CLOSED REDUCTION OF FRACTURE OF NASAL BONE N/A 2/20/2024    Procedure: CLOSED REDUCTION, FRACTURE, NASAL BONE;  Surgeon: Karlos Gallegos MD;  Location: [facility] OR;  Service: ENT;  Laterality: N/A;    CYSTOSCOPY N/A 6/28/2023    Procedure: CYSTOSCOPY;  Surgeon: Christian Jurado MD;  Location: [facility] OR;  Service: OB/GYN;  Laterality: N/A;    HYSTERECTOMY, TOTAL, LAPAROSCOPIC, WITH SALPINGO-OOPHORECTOMY Bilateral 6/28/2023    Procedure: HYSTERECTOMY,TOTAL,LAPAROSCOPIC,WITH SALPINGO-OOPHORECTOMY;  Surgeon: Christian Jurado MD;  Location: [facility] OR;  Service: OB/GYN;  Laterality: Bilateral;    SINUS SURGERY      TONSILLECTOMY AND ADENOIDECTOMY      TUBAL LIGATION       Family History    Problem Relation Name Age of Onset    Hypertension Mother      Diabetes Mellitus Mother      Cancer Mother      Hypertension Child       Social History[1]  Review of Systems   Constitutional:  Positive for activity change, chills, fatigue and fever.   HENT:  Positive for congestion, dental problem, facial swelling and sore throat.         Dental abscess - she reports it is still swollen.    Eyes:  Negative for pain and visual disturbance.   Respiratory:  Positive for cough and shortness of breath.    Cardiovascular:  Negative for chest pain and palpitations.   Gastrointestinal:  Positive for nausea and vomiting.   Genitourinary:  Negative for urgency.   Musculoskeletal:  Positive for arthralgias, back pain, myalgias and neck pain.   Skin:  Negative for color change and pallor.   Neurological:  Positive for dizziness and light-headedness.       Physical Exam     Initial Vitals [03/11/25 1452]   BP Pulse Resp Temp SpO2   (!) 173/87 76 18 98.6 °F (37 °C) 99 %      MAP       --         Physical Exam    Constitutional: She appears well-developed and well-nourished. She appears ill.   HENT:   Head: Normocephalic and atraumatic. Mouth/Throat: Oropharynx is clear and moist.   Eyes: Conjunctivae and EOM are normal. Pupils are equal, round, and reactive to light.   Neck: Neck supple.   Normal range of motion.  Cardiovascular:  Normal rate, regular rhythm and normal heart sounds.           Pulmonary/Chest: Breath sounds normal.   Abdominal: Abdomen is soft. Bowel sounds are normal.   Musculoskeletal:         General: Normal range of motion.      Cervical back: Normal range of motion and neck supple.     Neurological: She is alert and oriented to person, place, and time.   Skin: Skin is warm.   Psychiatric: She has a normal mood and affect. Her behavior is normal. Judgment and thought content normal.         Medical Screening Exam   See Full Note    ED Course   Procedures  Labs Reviewed   INFLUENZA A & B BY MOLECULAR - Normal        Result Value    INFLUENZA A MOLECULAR Negative      INFLUENZA B MOLECULAR  Negative     SARS-COV-2 RNA AMPLIFICATION, QUAL - Normal    SARS COV-2 Molecular Negative      Narrative:     Negative SARS-CoV results should not be used as the sole basis for treatment or patient management decisions; negative results should be considered in the context of a patient's recent exposures, history and the presene of clinical signs and symptoms consistent with COVID-19.  Negative results should be treated as presumptive and confirmed by molecular assay, if necessary for patient management.          Imaging Results    None          Medications   ketorolac injection 60 mg (60 mg Intramuscular Given 3/11/25 1649)   promethazine injection 25 mg (25 mg Intramuscular Given 3/11/25 1649)   dexAMETHasone injection 4 mg (4 mg Intramuscular Given 3/11/25 1649)   methylPREDNISolone acetate injection 80 mg (80 mg Intramuscular Given 3/11/25 1649)     Medical Decision Making  Risk  Prescription drug management.                          Medical Decision Making:   Initial Assessment:   Ms. Hilliard is a 49 y.o. female with extensive past medical history who presents to ED with flu-like symptoms which began in January. She was diagnosed with viral URI in ED on 1/2 and states she never got better. She reports joint pain, muscle aches, fever, dizziness, nausea, vomiting, cough, and shortness of breath have been ongoing.     She is seen by Dr. Hirsch with pain management for spinal stenosis and cervical radiculopathy.     The history is provided by the patient.     Differential Diagnosis:   Discharged home for fatigue             Clinical Impression:   Final diagnoses:  [R53.83] Fatigue, unspecified type (Primary)        ED Disposition Condition    Discharge Stable          ED Prescriptions       Medication Sig Dispense Start Date End Date Auth. Provider    clindamycin (CLEOCIN) 150 MG capsule Take 2 capsules (300 mg total) by mouth 4 (four)  times daily. for 7 days 56 capsule 3/11/2025 3/19/2025 Amadou Zamora MD          Follow-up Information    None            Randy Cole,   03/15/25 0750         [1]   Social History  Tobacco Use    Smoking status: Former     Current packs/day: 0.00     Types: Cigarettes     Quit date: 3/1/2023     Years since quittin.0     Passive exposure: Current    Smokeless tobacco: Never   Substance Use Topics    Alcohol use: Never    Drug use: Not Currently     Types: Marijuana     Comment: stopped 2 months ago        Randy Cole, DO  03/15/25 0750

## 2025-03-13 ENCOUNTER — OFFICE VISIT (OUTPATIENT)
Dept: FAMILY MEDICINE | Facility: CLINIC | Age: 50
End: 2025-03-13
Payer: COMMERCIAL

## 2025-03-13 VITALS
OXYGEN SATURATION: 98 % | TEMPERATURE: 99 F | HEART RATE: 66 BPM | DIASTOLIC BLOOD PRESSURE: 93 MMHG | SYSTOLIC BLOOD PRESSURE: 146 MMHG | HEIGHT: 63 IN | RESPIRATION RATE: 18 BRPM | WEIGHT: 163 LBS | BODY MASS INDEX: 28.88 KG/M2

## 2025-03-13 DIAGNOSIS — R53.83 FATIGUE, UNSPECIFIED TYPE: ICD-10-CM

## 2025-03-13 DIAGNOSIS — J30.2 SEASONAL ALLERGIC RHINITIS, UNSPECIFIED TRIGGER: ICD-10-CM

## 2025-03-13 DIAGNOSIS — Z83.3 FAMILY HISTORY OF DIABETES MELLITUS IN MOTHER: ICD-10-CM

## 2025-03-13 DIAGNOSIS — Z13.220 SCREENING FOR LIPOID DISORDERS: Primary | ICD-10-CM

## 2025-03-13 DIAGNOSIS — Z13.1 SCREENING FOR DIABETES MELLITUS: ICD-10-CM

## 2025-03-13 DIAGNOSIS — I10 PRIMARY HYPERTENSION: ICD-10-CM

## 2025-03-13 LAB
ALBUMIN SERPL BCP-MCNC: 4 G/DL (ref 3.5–5)
ALBUMIN/GLOB SERPL: 1.2 {RATIO}
ALP SERPL-CCNC: 103 U/L (ref 40–150)
ALT SERPL W P-5'-P-CCNC: 9 U/L
ANION GAP SERPL CALCULATED.3IONS-SCNC: 16 MMOL/L (ref 7–16)
AST SERPL W P-5'-P-CCNC: 24 U/L (ref 11–45)
BASOPHILS # BLD AUTO: 0.1 K/UL (ref 0–0.2)
BASOPHILS NFR BLD AUTO: 0.9 % (ref 0–1)
BILIRUB SERPL-MCNC: 0.2 MG/DL
BUN SERPL-MCNC: 9 MG/DL (ref 7–19)
BUN/CREAT SERPL: 12 (ref 6–20)
CALCIUM SERPL-MCNC: 9 MG/DL (ref 8.4–10.2)
CHLORIDE SERPL-SCNC: 105 MMOL/L (ref 98–107)
CHOLEST SERPL-MCNC: 151 MG/DL
CHOLEST/HDLC SERPL: 3.1 {RATIO}
CO2 SERPL-SCNC: 27 MMOL/L (ref 22–29)
CREAT SERPL-MCNC: 0.74 MG/DL (ref 0.55–1.02)
DIFFERENTIAL METHOD BLD: ABNORMAL
EGFR (NO RACE VARIABLE) (RUSH/TITUS): 99 ML/MIN/1.73M2
EOSINOPHIL # BLD AUTO: 0.15 K/UL (ref 0–0.5)
EOSINOPHIL NFR BLD AUTO: 1.4 % (ref 1–4)
ERYTHROCYTE [DISTWIDTH] IN BLOOD BY AUTOMATED COUNT: 14.7 % (ref 11.5–14.5)
EST. AVERAGE GLUCOSE BLD GHB EST-MCNC: 108 MG/DL
GLOBULIN SER-MCNC: 3.3 G/DL (ref 2–4)
GLUCOSE SERPL-MCNC: 62 MG/DL (ref 74–100)
HBA1C MFR BLD HPLC: 5.4 %
HCT VFR BLD AUTO: 39.6 % (ref 38–47)
HDLC SERPL-MCNC: 48 MG/DL (ref 35–60)
HGB BLD-MCNC: 12.1 G/DL (ref 12–16)
IMM GRANULOCYTES # BLD AUTO: 0.02 K/UL (ref 0–0.04)
IMM GRANULOCYTES NFR BLD: 0.2 % (ref 0–0.4)
LDLC SERPL CALC-MCNC: 82 MG/DL
LDLC/HDLC SERPL: 1.7 {RATIO}
LYMPHOCYTES # BLD AUTO: 3.4 K/UL (ref 1–4.8)
LYMPHOCYTES NFR BLD AUTO: 31.2 % (ref 27–41)
MCH RBC QN AUTO: 26.1 PG (ref 27–31)
MCHC RBC AUTO-ENTMCNC: 30.6 G/DL (ref 32–36)
MCV RBC AUTO: 85.3 FL (ref 80–96)
MONOCYTES # BLD AUTO: 0.67 K/UL (ref 0–0.8)
MONOCYTES NFR BLD AUTO: 6.1 % (ref 2–6)
MPC BLD CALC-MCNC: 11.2 FL (ref 9.4–12.4)
NEUTROPHILS # BLD AUTO: 6.56 K/UL (ref 1.8–7.7)
NEUTROPHILS NFR BLD AUTO: 60.2 % (ref 53–65)
NONHDLC SERPL-MCNC: 103 MG/DL
NRBC # BLD AUTO: 0 X10E3/UL
NRBC, AUTO (.00): 0 %
PLATELET # BLD AUTO: 272 K/UL (ref 150–400)
POTASSIUM SERPL-SCNC: 3.6 MMOL/L (ref 3.5–5.1)
PROT SERPL-MCNC: 7.3 G/DL (ref 6.4–8.3)
RBC # BLD AUTO: 4.64 M/UL (ref 4.2–5.4)
SODIUM SERPL-SCNC: 144 MMOL/L (ref 136–145)
TRIGL SERPL-MCNC: 105 MG/DL (ref 37–140)
TSH SERPL DL<=0.005 MIU/L-ACNC: 3.26 UIU/ML (ref 0.35–4.94)
VLDLC SERPL-MCNC: 21 MG/DL
WBC # BLD AUTO: 10.9 K/UL (ref 4.5–11)

## 2025-03-13 PROCEDURE — 80050 GENERAL HEALTH PANEL: CPT | Mod: ,,, | Performed by: CLINICAL MEDICAL LABORATORY

## 2025-03-13 RX ORDER — LORATADINE 10 MG/1
10 TABLET ORAL DAILY
Qty: 30 TABLET | Refills: 0 | Status: SHIPPED | OUTPATIENT
Start: 2025-03-13 | End: 2025-04-12

## 2025-03-13 RX ORDER — HYDROCHLOROTHIAZIDE 12.5 MG/1
12.5 TABLET ORAL DAILY
Qty: 30 TABLET | Refills: 0 | Status: SHIPPED | OUTPATIENT
Start: 2025-03-13 | End: 2025-04-12

## 2025-03-14 NOTE — ASSESSMENT & PLAN NOTE
M Patient presents to establish care  E Patient complains of copious mucous production requiring her to constantly spit and frequent sneezing  A Allergic rhinitis  T Ordered Loratadine

## 2025-03-14 NOTE — PROGRESS NOTES
Guillermo Stephens Jr., MD        PATIENT NAME: Martita Hilliard  : 1975  DATE: 3/13/25  MRN: [id number]      Billing Provider: Guillermo Stephens Jr., MD  Level of Service: UT OFFICE/OUTPT VISIT, EST, LEVL IV, 30-39 MIN  Patient PCP Information       Provider PCP Type    Primary Doctor No General            Reason for Visit / Chief Complaint: Establish Care (Wants to discuss her arthritis, joint pain and swelling. Dizziness and elevated BP. Rates her pain as a 9/10. States that she has never really fully recovered from the flu, she got sick in Dec. But it has never fully recovered she also had an abscess to her mouth that was irritation her. )       Update PCP  Update Chief Complaint         History of Present Illness / Problem Focused Workflow     Martita Hilliard presents to the clinic with Establish Care (Wants to discuss her arthritis, joint pain and swelling. Dizziness and elevated BP. Rates her pain as a 9/10. States that she has never really fully recovered from the flu, she got sick in Dec. But it has never fully recovered she also had an abscess to her mouth that was irritation her. )     Patient is a 48 Y/O AA Female who presents to establish care. Patient was occasionally seeing a different provider but due to the relocation of the clinic that is no longer possible. Patient has an extensive history of chronic pain due to Degenerative Bone disease that is shared with her mother and chronic pancreatitis. These two conditions require frequent hospitalizations potentially once a month. Patient  claims to experience full body body aches especially in her shoulders with multiple joint swellings and easy bruising. She also has a history of hypertension that has not been treated due to an allergic reaction to  Ace inhibitors in the past. She has recently started antiboitcs (Clindamycin) for an abscess in her teeth. She has a scheduled appointment with Dr. Hirsch with pain management to treat her pain with  injections but the current abscesses have hindered the treatment plan. Patient denies any current headaches or chest pain but endorses constant coughing and spitting up of copious mucus that she believes may come from seasonal allergies but believes there is more to it.      Review of Systems     Review of Systems   Constitutional:  Positive for chills and fatigue. Negative for unexpected weight change.   HENT:  Positive for dental problem, facial swelling and sneezing. Negative for congestion, drooling, ear pain and sore throat.    Eyes:  Negative for visual disturbance.   Respiratory:  Positive for cough and shortness of breath.    Cardiovascular:  Negative for chest pain, palpitations and leg swelling.   Gastrointestinal:  Negative for abdominal pain, nausea and vomiting.   Genitourinary:  Negative for difficulty urinating, dysuria and urgency.   Musculoskeletal:  Positive for arthralgias, joint swelling, myalgias and neck pain. Negative for back pain.   Skin:  Negative for rash.   Neurological:  Positive for dizziness and weakness. Negative for syncope and headaches.   Hematological:  Bruises/bleeds easily.   Psychiatric/Behavioral:  Negative for sleep disturbance.         Medical / Social / Family History     Past Medical History:   Diagnosis Date    Bone disease     Degenerative    Chronic sinusitis, unspecified     Crohn's disease     History of herpes genitalis     Hypertension     Pancreatitis     Sciatica        Past Surgical History:   Procedure Laterality Date    CLOSED REDUCTION OF FRACTURE OF NASAL BONE N/A 2/20/2024    Procedure: CLOSED REDUCTION, FRACTURE, NASAL BONE;  Surgeon: Karlos Gallegos MD;  Location: [facility] OR;  Service: ENT;  Laterality: N/A;    CYSTOSCOPY N/A 6/28/2023    Procedure: CYSTOSCOPY;  Surgeon: Christian Jurado MD;  Location: [facility] OR;  Service: OB/GYN;  Laterality: N/A;    HYSTERECTOMY, TOTAL, LAPAROSCOPIC, WITH SALPINGO-OOPHORECTOMY Bilateral 6/28/2023     Procedure: HYSTERECTOMY,TOTAL,LAPAROSCOPIC,WITH SALPINGO-OOPHORECTOMY;  Surgeon: Christian Jurado MD;  Location: [facility];  Service: OB/GYN;  Laterality: Bilateral;    SINUS SURGERY      TONSILLECTOMY AND ADENOIDECTOMY      TUBAL LIGATION         Social History  Ms.  reports that she quit smoking about 2 years ago. Her smoking use included cigarettes. She has been exposed to tobacco smoke. She has never used smokeless tobacco. She reports that she does not currently use drugs after having used the following drugs: Marijuana. She reports that she does not drink alcohol.    Family History  Ms.'s family history includes Cancer in her mother; Diabetes Mellitus in her mother; Hypertension in her child and mother.    Medications and Allergies     Medications  Outpatient Medications Marked as Taking for the 3/13/25 encounter (Office Visit) with Jr. Guillermo Stephens MD   Medication Sig Dispense Refill    azelastine (ASTELIN) 137 mcg (0.1 %) nasal spray 1 spray (137 mcg total) by Nasal route 2 (two) times daily. 30 mL 2    clindamycin (CLEOCIN) 150 MG capsule Take 2 capsules (300 mg total) by mouth 4 (four) times daily. for 7 days 56 capsule 0    diclofenac (VOLTAREN) 75 MG EC tablet Take 1 tablet (75 mg total) by mouth 2 (two) times daily. 30 tablet 0    ibuprofen (ADVIL,MOTRIN) 800 MG tablet Take 1 tablet (800 mg total) by mouth 3 (three) times daily as needed for pain.. take with food 90 tablet 0    sertraline (ZOLOFT) 25 MG tablet Take 25 mg by mouth every evening.         Allergies  Review of patient's allergies indicates:   Allergen Reactions    Ace inhibitors Swelling    Azithromycin Swelling    Ferrous sulfate Anaphylaxis    Penicillins     Rocephin [ceftriaxone]     Terazol 3 [terconazole] Other (See Comments)     Vaginal irritation and swollen       Physical Examination     Vitals:    03/13/25 1347   BP: (!) 146/93   Pulse: 66   Resp: 18   Temp: 99.1 °F (37.3 °C)     Physical Exam  Constitutional:        Appearance: Normal appearance. She is normal weight. She is ill-appearing.   HENT:      Head: Normocephalic and atraumatic.      Right Ear: External ear normal.      Left Ear: External ear normal.      Nose: Rhinorrhea present.      Mouth/Throat:      Mouth: Mucous membranes are moist.      Dentition: Dental caries and dental abscesses present.   Eyes:      Extraocular Movements: Extraocular movements intact.      Conjunctiva/sclera: Conjunctivae normal.      Pupils: Pupils are equal, round, and reactive to light.   Neck:      Thyroid: No thyroid mass.   Cardiovascular:      Rate and Rhythm: Normal rate and regular rhythm.      Pulses: Normal pulses.      Heart sounds: Normal heart sounds.   Pulmonary:      Effort: Pulmonary effort is normal. No respiratory distress.      Breath sounds: Normal breath sounds.   Abdominal:      General: Bowel sounds are normal. There is no distension.      Palpations: Abdomen is soft.      Tenderness: There is no abdominal tenderness.   Musculoskeletal:         General: Tenderness present. Normal range of motion.      Cervical back: Normal range of motion and neck supple. Tenderness present. Pain with movement and muscular tenderness present.   Skin:     General: Skin is warm and dry.      Capillary Refill: Capillary refill takes less than 2 seconds.      Findings: Bruising present.      Comments: Bruising on fingertips   Neurological:      General: No focal deficit present.      Mental Status: She is alert and oriented to person, place, and time.   Psychiatric:         Mood and Affect: Mood normal.         Behavior: Behavior normal.          Assessment and Plan (including Health Maintenance)      Problem List  Smart Sets  Document Outside HM   :    Plan:       Health Maintenance Due   Topic Date Due    Lipid Panel  Never done    Mammogram  Never done    Hemoglobin A1c (Diabetic Prevention Screening)  Never done    Colorectal Cancer Screening  Never done    Influenza Vaccine (1) Never  done    COVID-19 Vaccine (1 - 2024-25 season) Never done       1. Screening for lipoid disorders  Assessment & Plan:  M Patient presents to establish care   E Patients care gaps were out of date for lipid screening   A Screen for lipoid disorders  T Ordered lipid panel      Orders:  -     Lipid Panel; Future; Expected date: 03/13/2025    2. Primary hypertension  Assessment & Plan:  M Patient presents to establish care. Patient has a history of hypertension but has gone untreated due to an allergy to Ace Inibitors  E Patients current blood pressure is 146/93. She complains of dizzyness, balance problems and occasional headaches   A Hypertension  T Ordered HCTZ, blood pressure journal and CMP.      Orders:  -     Comprehensive Metabolic Panel; Future; Expected date: 03/13/2025  -     hydroCHLOROthiazide 12.5 MG Tab; Take 1 tablet (12.5 mg total) by mouth once daily. for 30 doses  Dispense: 30 tablet; Refill: 0    3. Screening for diabetes mellitus  Assessment & Plan:  M Patient presents to establish care  E Patient has a family history of Diabetes on her maternal side and care gaps indicate no recent A1C.  A Screening for Diabetes  T Ordered Hemoglobin A1c      Orders:  -     Hemoglobin A1C; Future; Expected date: 03/13/2025    4. Fatigue, unspecified type  Assessment & Plan:  M Patient presents to establish care.   E Patient has long standing history of pain and fatigue from an unknown bone disease that has been managed primarily by ER visits, pain management. Patient also complains of continued flu like symptoms since Dec  A Fatigue  T Ordered TSH, CBC      Orders:  -     CBC Auto Differential; Future; Expected date: 03/13/2025  -     TSH; Future; Expected date: 03/13/2025    5. Family history of diabetes mellitus in mother  Assessment & Plan:  See screening for Diabetes Mellitus     Orders:  -     Hemoglobin A1C; Future; Expected date: 03/13/2025    6. Seasonal allergic rhinitis, unspecified trigger  Assessment &  Plan:  M Patient presents to establish care  E Patient complains of copious mucous production requiring her to constantly spit and frequent sneezing  A Allergic rhinitis  T Ordered Loratadine       Orders:  -     loratadine (CLARITIN) 10 mg tablet; Take 1 tablet (10 mg total) by mouth once daily. for 30 doses  Dispense: 30 tablet; Refill: 0         Health Maintenance Topics with due status: Not Due       Topic Last Completion Date    TETANUS VACCINE 08/14/2024    RSV Vaccine (Age 60+ and Pregnant patients) Not Due       Future Appointments   Date Time Provider Department Center   3/27/2025  1:40 PM Jr. Guillermo Stephens MD [facility]   5/2/2025  9:45 AM Kami Hirsch MD [facility]   5/12/2025 11:00 AM Kami Hirsch MD [facility]   7/30/2025  2:30 PM Christian Jurado MD [facility]   8/13/2025  1:30 PM Christian Jurado MD [facility]        There are no Patient Instructions on file for this visit.  No follow-ups on file.     Signature:  Guillermo Stephens Jr., MD      Date of encounter: 3/13/25

## 2025-03-14 NOTE — ASSESSMENT & PLAN NOTE
M Patient presents to establish care  E Patient has a family history of Diabetes on her maternal side and care gaps indicate no recent A1C.  A Screening for Diabetes  T Ordered Hemoglobin A1c

## 2025-03-14 NOTE — ASSESSMENT & PLAN NOTE
M Patient presents to establish care. Patient has a history of hypertension but has gone untreated due to an allergy to Ace Inibitors  E Patients current blood pressure is 146/93. She complains of dizzyness, balance problems and occasional headaches   A Hypertension  T Ordered HCTZ, blood pressure journal and CMP.

## 2025-03-14 NOTE — ASSESSMENT & PLAN NOTE
M Patient presents to establish care.   E Patient has long standing history of pain and fatigue from an unknown bone disease that has been managed primarily by ER visits, pain management. Patient also complains of continued flu like symptoms since Dec  A Fatigue  T Ordered TSH, CBC

## 2025-03-14 NOTE — ASSESSMENT & PLAN NOTE
M Patient presents to establish care   E Patients care gaps were out of date for lipid screening   A Screen for lipoid disorders  T Ordered lipid panel

## 2025-03-20 ENCOUNTER — RESULTS FOLLOW-UP (OUTPATIENT)
Dept: FAMILY MEDICINE | Facility: CLINIC | Age: 50
End: 2025-03-20

## 2025-03-20 NOTE — PROGRESS NOTES
Attempted to call patient to discuss results  Patient did not answer so I left a message via [email or web address]   Requested patient return to obtain iron studies and discuss her diet since her glucose was low  Patient is scheduled to return on 3/27/25

## 2025-03-23 ENCOUNTER — HOSPITAL ENCOUNTER (OUTPATIENT)
Facility: HOSPITAL | Age: 50
Discharge: HOME | End: 2025-03-25
Attending: EMERGENCY MEDICINE | Admitting: FAMILY MEDICINE
Payer: COMMERCIAL

## 2025-03-23 DIAGNOSIS — K50.90 CROHN DISEASE: ICD-10-CM

## 2025-03-23 DIAGNOSIS — E87.6 HYPOKALEMIA: ICD-10-CM

## 2025-03-23 DIAGNOSIS — R53.1 GENERALIZED WEAKNESS: ICD-10-CM

## 2025-03-23 DIAGNOSIS — R55 NEAR SYNCOPE: ICD-10-CM

## 2025-03-23 DIAGNOSIS — R19.7 DIARRHEA, UNSPECIFIED TYPE: ICD-10-CM

## 2025-03-23 DIAGNOSIS — R11.2 INTRACTABLE NAUSEA AND VOMITING: ICD-10-CM

## 2025-03-23 DIAGNOSIS — Z12.11 COLON CANCER SCREENING: ICD-10-CM

## 2025-03-23 DIAGNOSIS — K29.01 OTHER ACUTE GASTRITIS WITH HEMORRHAGE: ICD-10-CM

## 2025-03-23 DIAGNOSIS — R55 SYNCOPE: ICD-10-CM

## 2025-03-23 DIAGNOSIS — R11.2 NAUSEA AND VOMITING, UNSPECIFIED VOMITING TYPE: Primary | ICD-10-CM

## 2025-03-23 LAB
ALBUMIN SERPL BCP-MCNC: 4.2 G/DL (ref 3.5–5)
ALBUMIN/GLOB SERPL: 1.2 {RATIO}
ALP SERPL-CCNC: 123 U/L (ref 40–150)
ALT SERPL W P-5'-P-CCNC: 13 U/L
ANION GAP SERPL CALCULATED.3IONS-SCNC: 19 MMOL/L (ref 7–16)
AST SERPL W P-5'-P-CCNC: 30 U/L (ref 11–45)
BACTERIA #/AREA URNS HPF: ABNORMAL /HPF
BASOPHILS # BLD AUTO: 0.07 K/UL (ref 0–0.2)
BASOPHILS NFR BLD AUTO: 0.4 % (ref 0–1)
BILIRUB SERPL-MCNC: 0.4 MG/DL
BILIRUB UR QL STRIP: NEGATIVE
BUN SERPL-MCNC: 13 MG/DL (ref 7–19)
BUN/CREAT SERPL: 16 (ref 6–20)
CALCIUM SERPL-MCNC: 9.3 MG/DL (ref 8.4–10.2)
CHLORIDE SERPL-SCNC: 102 MMOL/L (ref 98–107)
CLARITY UR: CLEAR
CO2 SERPL-SCNC: 19 MMOL/L (ref 22–29)
COLOR UR: YELLOW
CREAT SERPL-MCNC: 0.82 MG/DL (ref 0.55–1.02)
DIFFERENTIAL METHOD BLD: ABNORMAL
EGFR (NO RACE VARIABLE) (RUSH/TITUS): 88 ML/MIN/1.73M2
EOSINOPHIL # BLD AUTO: 0.05 K/UL (ref 0–0.5)
EOSINOPHIL NFR BLD AUTO: 0.3 % (ref 1–4)
ERYTHROCYTE [DISTWIDTH] IN BLOOD BY AUTOMATED COUNT: 13.8 % (ref 11.5–14.5)
GLOBULIN SER-MCNC: 3.4 G/DL (ref 2–4)
GLUCOSE SERPL-MCNC: 145 MG/DL (ref 74–100)
GLUCOSE UR STRIP-MCNC: NORMAL MG/DL
HCO3 UR-SCNC: 6.6 MMOL/L (ref 24–28)
HCT VFR BLD AUTO: 40.1 % (ref 38–47)
HCT VFR BLD CALC: <15 % (ref 35–51)
HEMOCCULT STL QL: POSITIVE
HGB BLD-MCNC: 12.8 G/DL (ref 12–16)
IMM GRANULOCYTES # BLD AUTO: 0.12 K/UL (ref 0–0.04)
IMM GRANULOCYTES NFR BLD: 0.6 % (ref 0–0.4)
KETONES UR STRIP-SCNC: NEGATIVE MG/DL
LDH SERPL L TO P-CCNC: 0.4 MMOL/L (ref 0.3–1.2)
LEUKOCYTE ESTERASE UR QL STRIP: NEGATIVE
LIPASE SERPL-CCNC: 25 U/L
LYMPHOCYTES # BLD AUTO: 2.39 K/UL (ref 1–4.8)
LYMPHOCYTES NFR BLD AUTO: 12.1 % (ref 27–41)
MAGNESIUM SERPL-MCNC: 1.9 MG/DL (ref 1.6–2.6)
MCH RBC QN AUTO: 26.4 PG (ref 27–31)
MCHC RBC AUTO-ENTMCNC: 31.9 G/DL (ref 32–36)
MCV RBC AUTO: 82.9 FL (ref 80–96)
MONOCYTES # BLD AUTO: 0.92 K/UL (ref 0–0.8)
MONOCYTES NFR BLD AUTO: 4.7 % (ref 2–6)
MPC BLD CALC-MCNC: 10.4 FL (ref 9.4–12.4)
MUCOUS, UA: ABNORMAL /LPF
NEUTROPHILS # BLD AUTO: 16.17 K/UL (ref 1.8–7.7)
NEUTROPHILS NFR BLD AUTO: 81.9 % (ref 53–65)
NITRITE UR QL STRIP: NEGATIVE
NRBC # BLD AUTO: 0 X10E3/UL
NRBC, AUTO (.00): 0 %
PCO2 BLDA: 12 MMHG (ref 41–51)
PH SMN: 7.35 [PH] (ref 7.32–7.42)
PH UR STRIP: 5.5 PH UNITS
PLATELET # BLD AUTO: 261 K/UL (ref 150–400)
PO2 BLDA: 47 MMHG (ref 25–40)
POC BASE EXCESS: -16.1 MMOL/L (ref -2–3)
POC CO2: 7 MMOL/L
POC IONIZED CALCIUM: 0.37 MMOL/L (ref 1.15–1.35)
POC SATURATED O2: 80 % (ref 40–70)
POCT GLUCOSE: 37 MG/DL (ref 60–95)
POTASSIUM BLD-SCNC: 0.6 MMOL/L (ref 3.4–4.5)
POTASSIUM SERPL-SCNC: 2.9 MMOL/L (ref 3.5–5.1)
PROT SERPL-MCNC: 7.6 G/DL (ref 6.4–8.3)
PROT UR QL STRIP: NEGATIVE
RBC # BLD AUTO: 4.84 M/UL (ref 4.2–5.4)
RBC # UR STRIP: ABNORMAL /UL
RBC #/AREA URNS HPF: 7 /HPF
SODIUM BLD-SCNC: 150 MMOL/L (ref 136–145)
SODIUM SERPL-SCNC: 137 MMOL/L (ref 136–145)
SP GR UR STRIP: 1.02
SQUAMOUS #/AREA URNS LPF: ABNORMAL /HPF
UROBILINOGEN UR STRIP-ACNC: NORMAL MG/DL
WBC # BLD AUTO: 19.72 K/UL (ref 4.5–11)
WBC #/AREA URNS HPF: 2 /HPF

## 2025-03-23 PROCEDURE — 84295 ASSAY OF SERUM SODIUM: CPT

## 2025-03-23 PROCEDURE — 83605 ASSAY OF LACTIC ACID: CPT

## 2025-03-23 PROCEDURE — 83735 ASSAY OF MAGNESIUM: CPT | Performed by: EMERGENCY MEDICINE

## 2025-03-23 PROCEDURE — 85025 COMPLETE CBC W/AUTO DIFF WBC: CPT | Performed by: EMERGENCY MEDICINE

## 2025-03-23 PROCEDURE — 96365 THER/PROPH/DIAG IV INF INIT: CPT

## 2025-03-23 PROCEDURE — 87040 BLOOD CULTURE FOR BACTERIA: CPT | Performed by: EMERGENCY MEDICINE

## 2025-03-23 PROCEDURE — 82947 ASSAY GLUCOSE BLOOD QUANT: CPT

## 2025-03-23 PROCEDURE — 82330 ASSAY OF CALCIUM: CPT

## 2025-03-23 PROCEDURE — 85014 HEMATOCRIT: CPT

## 2025-03-23 PROCEDURE — 80053 COMPREHEN METABOLIC PANEL: CPT | Performed by: EMERGENCY MEDICINE

## 2025-03-23 PROCEDURE — 81001 URINALYSIS AUTO W/SCOPE: CPT | Performed by: EMERGENCY MEDICINE

## 2025-03-23 PROCEDURE — 83690 ASSAY OF LIPASE: CPT | Performed by: EMERGENCY MEDICINE

## 2025-03-23 PROCEDURE — 93005 ELECTROCARDIOGRAM TRACING: CPT

## 2025-03-23 PROCEDURE — 93010 ELECTROCARDIOGRAM REPORT: CPT | Mod: ,,, | Performed by: HOSPITALIST

## 2025-03-23 PROCEDURE — 25500020 PHARM REV CODE 255: Performed by: EMERGENCY MEDICINE

## 2025-03-23 PROCEDURE — 96375 TX/PRO/DX INJ NEW DRUG ADDON: CPT

## 2025-03-23 PROCEDURE — 96361 HYDRATE IV INFUSION ADD-ON: CPT

## 2025-03-23 PROCEDURE — 96368 THER/DIAG CONCURRENT INF: CPT

## 2025-03-23 PROCEDURE — 99285 EMERGENCY DEPT VISIT HI MDM: CPT | Mod: 25

## 2025-03-23 PROCEDURE — 63600175 PHARM REV CODE 636 W HCPCS: Performed by: EMERGENCY MEDICINE

## 2025-03-23 PROCEDURE — 25000003 PHARM REV CODE 250: Performed by: EMERGENCY MEDICINE

## 2025-03-23 PROCEDURE — 84132 ASSAY OF SERUM POTASSIUM: CPT

## 2025-03-23 PROCEDURE — 82803 BLOOD GASES ANY COMBINATION: CPT

## 2025-03-23 PROCEDURE — 82272 OCCULT BLD FECES 1-3 TESTS: CPT

## 2025-03-23 RX ORDER — POTASSIUM CHLORIDE 7.45 MG/ML
10 INJECTION INTRAVENOUS
Status: COMPLETED | OUTPATIENT
Start: 2025-03-23 | End: 2025-03-24

## 2025-03-23 RX ORDER — IOPAMIDOL 755 MG/ML
100 INJECTION, SOLUTION INTRAVASCULAR
Status: COMPLETED | OUTPATIENT
Start: 2025-03-23 | End: 2025-03-23

## 2025-03-23 RX ORDER — POTASSIUM CHLORIDE 20 MEQ/1
40 TABLET, EXTENDED RELEASE ORAL
Status: COMPLETED | OUTPATIENT
Start: 2025-03-23 | End: 2025-03-23

## 2025-03-23 RX ADMIN — IOPAMIDOL 65 ML: 755 INJECTION, SOLUTION INTRAVENOUS at 11:03

## 2025-03-23 RX ADMIN — POTASSIUM CHLORIDE 40 MEQ: 1500 TABLET, EXTENDED RELEASE ORAL at 11:03

## 2025-03-23 RX ADMIN — PROMETHAZINE HYDROCHLORIDE 25 MG: 25 INJECTION INTRAMUSCULAR; INTRAVENOUS at 11:03

## 2025-03-23 RX ADMIN — SODIUM CHLORIDE 1000 ML: 9 INJECTION, SOLUTION INTRAVENOUS at 09:03

## 2025-03-23 RX ADMIN — POTASSIUM CHLORIDE 10 MEQ: 7.46 INJECTION, SOLUTION INTRAVENOUS at 11:03

## 2025-03-24 ENCOUNTER — ANESTHESIA (OUTPATIENT)
Dept: GASTROENTEROLOGY | Facility: HOSPITAL | Age: 50
End: 2025-03-24
Payer: COMMERCIAL

## 2025-03-24 ENCOUNTER — ANESTHESIA EVENT (OUTPATIENT)
Dept: GASTROENTEROLOGY | Facility: HOSPITAL | Age: 50
End: 2025-03-24
Payer: COMMERCIAL

## 2025-03-24 PROBLEM — N17.9 AKI (ACUTE KIDNEY INJURY): Status: RESOLVED | Noted: 2025-03-24 | Resolved: 2025-03-24

## 2025-03-24 PROBLEM — E87.6 HYPOKALEMIA: Status: ACTIVE | Noted: 2025-03-24

## 2025-03-24 PROBLEM — N17.9 AKI (ACUTE KIDNEY INJURY): Status: ACTIVE | Noted: 2025-03-24

## 2025-03-24 LAB
AMPHET UR QL SCN: NEGATIVE
BARBITURATES UR QL SCN: NEGATIVE
BENZODIAZ METAB UR QL SCN: NEGATIVE
CANNABINOIDS UR QL SCN: POSITIVE
COCAINE UR QL SCN: NEGATIVE
OHS QRS DURATION: 80 MS
OHS QTC CALCULATION: 443 MS
OPIATES UR QL SCN: NEGATIVE
PCP UR QL SCN: NEGATIVE

## 2025-03-24 PROCEDURE — 80307 DRUG TEST PRSMV CHEM ANLYZR: CPT | Performed by: EMERGENCY MEDICINE

## 2025-03-24 PROCEDURE — G0378 HOSPITAL OBSERVATION PER HR: HCPCS

## 2025-03-24 PROCEDURE — 25000003 PHARM REV CODE 250: Performed by: EMERGENCY MEDICINE

## 2025-03-24 PROCEDURE — 43239 EGD BIOPSY SINGLE/MULTIPLE: CPT | Performed by: INTERNAL MEDICINE

## 2025-03-24 PROCEDURE — 99222 1ST HOSP IP/OBS MODERATE 55: CPT | Mod: GC,,, | Performed by: HOSPITALIST

## 2025-03-24 PROCEDURE — 96361 HYDRATE IV INFUSION ADD-ON: CPT

## 2025-03-24 PROCEDURE — 88342 IMHCHEM/IMCYTCHM 1ST ANTB: CPT | Mod: TC,SUR | Performed by: INTERNAL MEDICINE

## 2025-03-24 PROCEDURE — 96375 TX/PRO/DX INJ NEW DRUG ADDON: CPT

## 2025-03-24 PROCEDURE — 96376 TX/PRO/DX INJ SAME DRUG ADON: CPT

## 2025-03-24 PROCEDURE — 25000003 PHARM REV CODE 250

## 2025-03-24 PROCEDURE — 63600175 PHARM REV CODE 636 W HCPCS: Performed by: EMERGENCY MEDICINE

## 2025-03-24 PROCEDURE — 27000284 HC CANNULA NASAL: Performed by: NURSE ANESTHETIST, CERTIFIED REGISTERED

## 2025-03-24 PROCEDURE — 63600175 PHARM REV CODE 636 W HCPCS: Performed by: HOSPITALIST

## 2025-03-24 PROCEDURE — 63600175 PHARM REV CODE 636 W HCPCS

## 2025-03-24 PROCEDURE — 96374 THER/PROPH/DIAG INJ IV PUSH: CPT | Mod: 59

## 2025-03-24 PROCEDURE — 63600175 PHARM REV CODE 636 W HCPCS: Performed by: NURSE ANESTHETIST, CERTIFIED REGISTERED

## 2025-03-24 PROCEDURE — 88342 IMHCHEM/IMCYTCHM 1ST ANTB: CPT | Mod: 26,,, | Performed by: PATHOLOGY

## 2025-03-24 PROCEDURE — 96366 THER/PROPH/DIAG IV INF ADDON: CPT

## 2025-03-24 PROCEDURE — 88305 TISSUE EXAM BY PATHOLOGIST: CPT | Mod: 26,,, | Performed by: PATHOLOGY

## 2025-03-24 RX ORDER — SODIUM CHLORIDE 9 MG/ML
1000 INJECTION, SOLUTION INTRAVENOUS
Status: COMPLETED | OUTPATIENT
Start: 2025-03-24 | End: 2025-03-24

## 2025-03-24 RX ORDER — ALUMINUM HYDROXIDE, MAGNESIUM HYDROXIDE, AND SIMETHICONE 2400; 240; 2400 MG/30ML; MG/30ML; MG/30ML
30 SUSPENSION ORAL EVERY 6 HOURS PRN
Status: DISCONTINUED | OUTPATIENT
Start: 2025-03-24 | End: 2025-03-25 | Stop reason: HOSPADM

## 2025-03-24 RX ORDER — LIDOCAINE HYDROCHLORIDE 20 MG/ML
INJECTION, SOLUTION EPIDURAL; INFILTRATION; INTRACAUDAL; PERINEURAL
Status: DISCONTINUED | OUTPATIENT
Start: 2025-03-24 | End: 2025-03-24

## 2025-03-24 RX ORDER — SODIUM CHLORIDE AND POTASSIUM CHLORIDE 150; 900 MG/100ML; MG/100ML
INJECTION, SOLUTION INTRAVENOUS CONTINUOUS
Status: DISCONTINUED | OUTPATIENT
Start: 2025-03-24 | End: 2025-03-25 | Stop reason: HOSPADM

## 2025-03-24 RX ORDER — MORPHINE SULFATE 4 MG/ML
4 INJECTION, SOLUTION INTRAMUSCULAR; INTRAVENOUS EVERY 4 HOURS PRN
Refills: 0 | Status: DISCONTINUED | OUTPATIENT
Start: 2025-03-24 | End: 2025-03-25 | Stop reason: HOSPADM

## 2025-03-24 RX ORDER — OXYCODONE HYDROCHLORIDE 5 MG/1
5 TABLET ORAL EVERY 4 HOURS PRN
Refills: 0 | Status: DISCONTINUED | OUTPATIENT
Start: 2025-03-24 | End: 2025-03-25 | Stop reason: HOSPADM

## 2025-03-24 RX ORDER — PROPOFOL 10 MG/ML
VIAL (ML) INTRAVENOUS
Status: DISCONTINUED | OUTPATIENT
Start: 2025-03-24 | End: 2025-03-24

## 2025-03-24 RX ORDER — BISACODYL 5 MG
10 TABLET, DELAYED RELEASE (ENTERIC COATED) ORAL DAILY PRN
Status: DISCONTINUED | OUTPATIENT
Start: 2025-03-24 | End: 2025-03-25 | Stop reason: HOSPADM

## 2025-03-24 RX ORDER — ONDANSETRON HYDROCHLORIDE 2 MG/ML
8 INJECTION, SOLUTION INTRAVENOUS EVERY 6 HOURS PRN
Status: DISCONTINUED | OUTPATIENT
Start: 2025-03-24 | End: 2025-03-25 | Stop reason: HOSPADM

## 2025-03-24 RX ORDER — ACETAMINOPHEN 500 MG
1000 TABLET ORAL EVERY 6 HOURS PRN
Status: DISCONTINUED | OUTPATIENT
Start: 2025-03-24 | End: 2025-03-25 | Stop reason: HOSPADM

## 2025-03-24 RX ORDER — MORPHINE SULFATE 4 MG/ML
4 INJECTION, SOLUTION INTRAMUSCULAR; INTRAVENOUS
Refills: 0 | Status: COMPLETED | OUTPATIENT
Start: 2025-03-24 | End: 2025-03-24

## 2025-03-24 RX ORDER — PANTOPRAZOLE SODIUM 40 MG/10ML
40 INJECTION, POWDER, LYOPHILIZED, FOR SOLUTION INTRAVENOUS 2 TIMES DAILY
Status: DISCONTINUED | OUTPATIENT
Start: 2025-03-24 | End: 2025-03-25 | Stop reason: HOSPADM

## 2025-03-24 RX ORDER — DOCUSATE SODIUM 100 MG/1
100 CAPSULE, LIQUID FILLED ORAL 2 TIMES DAILY PRN
Status: DISCONTINUED | OUTPATIENT
Start: 2025-03-24 | End: 2025-03-25 | Stop reason: HOSPADM

## 2025-03-24 RX ORDER — TRAZODONE HYDROCHLORIDE 50 MG/1
50 TABLET ORAL NIGHTLY PRN
Status: DISCONTINUED | OUTPATIENT
Start: 2025-03-24 | End: 2025-03-25 | Stop reason: HOSPADM

## 2025-03-24 RX ORDER — ONDANSETRON HYDROCHLORIDE 2 MG/ML
8 INJECTION, SOLUTION INTRAVENOUS EVERY 6 HOURS PRN
Status: DISCONTINUED | OUTPATIENT
Start: 2025-03-24 | End: 2025-03-24

## 2025-03-24 RX ORDER — ONDANSETRON HYDROCHLORIDE 2 MG/ML
4 INJECTION, SOLUTION INTRAVENOUS
Status: COMPLETED | OUTPATIENT
Start: 2025-03-24 | End: 2025-03-24

## 2025-03-24 RX ORDER — FENTANYL CITRATE 50 UG/ML
INJECTION, SOLUTION INTRAMUSCULAR; INTRAVENOUS
Status: DISCONTINUED | OUTPATIENT
Start: 2025-03-24 | End: 2025-03-24

## 2025-03-24 RX ORDER — GUAIFENESIN AND DEXTROMETHORPHAN HYDROBROMIDE 10; 100 MG/5ML; MG/5ML
10 SYRUP ORAL EVERY 6 HOURS PRN
Status: DISCONTINUED | OUTPATIENT
Start: 2025-03-24 | End: 2025-03-25 | Stop reason: HOSPADM

## 2025-03-24 RX ORDER — SODIUM CHLORIDE 9 MG/ML
INJECTION, SOLUTION INTRAVENOUS
Status: COMPLETED
Start: 2025-03-24 | End: 2025-03-24

## 2025-03-24 RX ORDER — TALC
6 POWDER (GRAM) TOPICAL NIGHTLY PRN
Status: DISCONTINUED | OUTPATIENT
Start: 2025-03-24 | End: 2025-03-25 | Stop reason: HOSPADM

## 2025-03-24 RX ORDER — DIPHENHYDRAMINE HCL 25 MG
50 CAPSULE ORAL EVERY 6 HOURS PRN
Status: DISCONTINUED | OUTPATIENT
Start: 2025-03-24 | End: 2025-03-25 | Stop reason: HOSPADM

## 2025-03-24 RX ORDER — METOCLOPRAMIDE HYDROCHLORIDE 5 MG/ML
10 INJECTION INTRAMUSCULAR; INTRAVENOUS EVERY 8 HOURS
Status: DISCONTINUED | OUTPATIENT
Start: 2025-03-24 | End: 2025-03-25 | Stop reason: HOSPADM

## 2025-03-24 RX ORDER — ACETAMINOPHEN 650 MG/1
650 SUPPOSITORY RECTAL EVERY 6 HOURS PRN
Status: DISCONTINUED | OUTPATIENT
Start: 2025-03-24 | End: 2025-03-25 | Stop reason: HOSPADM

## 2025-03-24 RX ADMIN — PROPOFOL 50 MG: 10 INJECTION, EMULSION INTRAVENOUS at 02:03

## 2025-03-24 RX ADMIN — PANTOPRAZOLE SODIUM 8 MG/HR: 40 INJECTION, POWDER, FOR SOLUTION INTRAVENOUS at 10:03

## 2025-03-24 RX ADMIN — METOCLOPRAMIDE HYDROCHLORIDE 10 MG: 5 INJECTION INTRAMUSCULAR; INTRAVENOUS at 04:03

## 2025-03-24 RX ADMIN — PANTOPRAZOLE SODIUM 40 MG: 40 INJECTION, POWDER, FOR SOLUTION INTRAVENOUS at 10:03

## 2025-03-24 RX ADMIN — MORPHINE SULFATE 4 MG: 4 INJECTION, SOLUTION INTRAMUSCULAR; INTRAVENOUS at 03:03

## 2025-03-24 RX ADMIN — SODIUM CHLORIDE 1000 ML: 9 INJECTION, SOLUTION INTRAVENOUS at 12:03

## 2025-03-24 RX ADMIN — METOCLOPRAMIDE HYDROCHLORIDE 10 MG: 5 INJECTION INTRAMUSCULAR; INTRAVENOUS at 10:03

## 2025-03-24 RX ADMIN — POTASSIUM CHLORIDE AND SODIUM CHLORIDE: 900; 150 INJECTION, SOLUTION INTRAVENOUS at 02:03

## 2025-03-24 RX ADMIN — FENTANYL CITRATE 100 MCG: 50 INJECTION INTRAMUSCULAR; INTRAVENOUS at 02:03

## 2025-03-24 RX ADMIN — ONDANSETRON 4 MG: 2 INJECTION INTRAMUSCULAR; INTRAVENOUS at 12:03

## 2025-03-24 RX ADMIN — METOCLOPRAMIDE HYDROCHLORIDE 10 MG: 5 INJECTION INTRAMUSCULAR; INTRAVENOUS at 06:03

## 2025-03-24 RX ADMIN — POTASSIUM CHLORIDE AND SODIUM CHLORIDE: 900; 150 INJECTION, SOLUTION INTRAVENOUS at 04:03

## 2025-03-24 RX ADMIN — MORPHINE SULFATE 4 MG: 4 INJECTION INTRAVENOUS at 12:03

## 2025-03-24 RX ADMIN — MORPHINE SULFATE 4 MG: 4 INJECTION, SOLUTION INTRAMUSCULAR; INTRAVENOUS at 06:03

## 2025-03-24 RX ADMIN — POTASSIUM CHLORIDE AND SODIUM CHLORIDE: 900; 150 INJECTION, SOLUTION INTRAVENOUS at 01:03

## 2025-03-24 RX ADMIN — LIDOCAINE HYDROCHLORIDE 100 MG: 20 INJECTION, SOLUTION EPIDURAL; INFILTRATION; INTRACAUDAL; PERINEURAL at 02:03

## 2025-03-24 NOTE — CONSULTS
CC: nausea and vomiting    HPI 49 y.o. female with history of reported chronic pancreatitis and Crohn's disease (no recent endoscopic evaluation), chronic marijuana use, migraines who presents for evaluation of nausea, vomiting as well as dark tarry stools in the setting of ibuprofen use. She states that symptoms have been ongoing for years. She has been using ibuprofen 800 mg daily for chronic pain. She does report abdominal pain as well as changes in bowl habits. She states pain is diffuse throughout abdomen. She had endoscopic evaluation many years ago at [facility] and was given the diagnoses above.CT without acute abnormality just small hiatal hernia. H/H 12.8/40.1 on admission. UDS positive for cannabinoid.    Medical records reviewed. Additional history supplemented by nursing.     Past Medical History:   Diagnosis Date    Cervical radiculopathy 10/23/2024    Chronic pancreatitis     she told me this in 2022 when admitted for same thing    Chronic right-sided low back pain with right-sided sciatica 09/26/2024    Crohn's disease     per patient history; no pathology    Essential (primary) hypertension     History of herpes genitalis     Dr. Adrian Harrell    Marijuana dependence     Migraine without aura and without status migrainosus, not intractable 11/22/2024    [facility]    Tobacco dependence      Past Surgical History:   Procedure Laterality Date    CLOSED REDUCTION OF FRACTURE OF NASAL BONE N/A 2/20/2024    Procedure: CLOSED REDUCTION, FRACTURE, NASAL BONE;  Surgeon: Karlos Gallegos MD;  Location: [facility] OR;  Service: ENT;  Laterality: N/A;    CYSTOSCOPY N/A 6/28/2023    Procedure: CYSTOSCOPY;  Surgeon: Christian Jurado MD;  Location: [facility] OR;  Service: OB/GYN;  Laterality: N/A;    HYSTERECTOMY, TOTAL, LAPAROSCOPIC, WITH SALPINGO-OOPHORECTOMY Bilateral 6/28/2023    Procedure: HYSTERECTOMY,TOTAL,LAPAROSCOPIC,WITH SALPINGO-OOPHORECTOMY;  Surgeon: Christian Jurado  "MD;  Location: [facility];  Service: OB/GYN;  Laterality: Bilateral;    SINUS SURGERY      TONSILLECTOMY AND ADENOIDECTOMY      TUBAL LIGATION       Social History  Social History[1]    Family History   Problem Relation Name Age of Onset    Hypertension Mother      Diabetes Mellitus Mother      Cancer Mother      Hypertension Child       Physical Examination  /75   Pulse 72   Temp 98.4 °F (36.9 °C) (Oral)   Resp 19   Ht 5' 3" (1.6 m)   Wt 71.5 kg (157 lb 11.2 oz)   LMP 06/24/2023 (Exact Date)   SpO2 98%   Breastfeeding No   BMI 27.94 kg/m²   General appearance: alert, cooperative, no distress  HENT: Normocephalic, atraumatic, neck symmetrical, no nasal discharge   Eyes: conjunctivae/corneas clear, PERRL, EOM's intact  Lungs: no labored breathing, symmetric chest wall expansion bilaterally  Heart: regular rate and rhythm without rub; no displacement of the PMI   Abdomen: soft, non-tender; bowel sounds normoactive; no organomegaly  Extremities: extremities symmetric; no clubbing, cyanosis, or edema  Integument: Skin color, texture, turgor normal; no rashes; hair distrubution normal  Neurologic: Alert and oriented X 3, did not test gait  Psychiatric: no pressured speech; normal affect; no evidence of impaired cognition     Labs:  Lab Results   Component Value Date    WBC 19.72 (H) 03/23/2025    HGB 12.8 03/23/2025    HCT <15 (LL) 03/23/2025    MCV 82.9 03/23/2025     03/23/2025     CMP  Sodium   Date Value Ref Range Status   03/23/2025 137 136 - 145 mmol/L Final     Potassium   Date Value Ref Range Status   03/23/2025 2.9 (L) 3.5 - 5.1 mmol/L Final     Chloride   Date Value Ref Range Status   03/23/2025 102 98 - 107 mmol/L Final     CO2   Date Value Ref Range Status   03/23/2025 19 (L) 22 - 29 mmol/L Final     Glucose   Date Value Ref Range Status   03/23/2025 145 (H) 74 - 100 mg/dL Final     BUN   Date Value Ref Range Status   03/23/2025 13 7 - 19 mg/dL Final     Creatinine   Date Value Ref " Range Status   2025 0.82 0.55 - 1.02 mg/dL Final     Calcium   Date Value Ref Range Status   2025 9.3 8.4 - 10.2 mg/dL Final     Total Protein   Date Value Ref Range Status   2025 7.6 6.4 - 8.3 g/dL Final     Albumin   Date Value Ref Range Status   2025 4.2 3.5 - 5.0 g/dL Final     Bilirubin, Total   Date Value Ref Range Status   2025 0.4 <=1.5 mg/dL Final     Alk Phos   Date Value Ref Range Status   2025 123 40 - 150 U/L Final     AST   Date Value Ref Range Status   2025 30 11 - 45 U/L Final     ALT   Date Value Ref Range Status   2025 13 <=55 U/L Final     Anion Gap   Date Value Ref Range Status   2025 19 (H) 7 - 16 mmol/L Final     eGFR    Date Value Ref Range Status   2021 104  Final     eGFR   Date Value Ref Range Status   2021 86  Final     Imaging:  CT Abdomen Pelvis With IV Contrast NO Oral Contrast   Final Result      1. No acute abnormality   2. Small hiatal hernia.         Electronically signed by: Lyle Paul   Date:    2025   Time:    00:13      X-Ray Chest AP Portable   Final Result      No acute cardiopulmonary abnormality.         Electronically signed by: Deya Martin   Date:    2025   Time:    08:53        CT with no acute abn, small hiatal hernia    Assessment:   Nausea and vomiting  Abdominal pain  Chronic marijuana use  Chronic NSAIDs use  History of reported Crohn's disease but without endoscopic or pathologic dianosis    Plan:   -Protonix 40 mg IV BID  -NPO, IVFs  -EGD today for evaluation  -Anti-emetics as needed    Dev Holbrook MD  [facility]           [1]   Social History  Tobacco Use    Smoking status: Former     Current packs/day: 0.00     Types: Cigarettes     Quit date: 3/1/2023     Years since quittin.0     Passive exposure: Current    Smokeless tobacco: Never   Substance Use Topics    Alcohol use: Never    Drug use: Not Currently     Types: Marijuana      Comment: stopped 2 months ago

## 2025-03-24 NOTE — DISCHARGE INSTRUCTIONS
Procedure Date  3/24/25     Impression  Overall Impression:   The esophagus appeared normal.  Abnormal mucosa in the fundus of the stomach; performed cold forceps biopsy  Performed forceps biopsies in the body of the stomach and antrum to rule out H. pylori  The duodenal bulb and 2nd part of the duodenum appeared normal.        Recommendation  -History of chronic nausea and vomiting with melena which is related to hemorrhagic gastritis from vomiting  -Continue PPI IV BID during hospitalization then can transition to PO BID upon discharge  -Anti-emetics as needed  -Recommend outpatient colonoscopy     Indication  Nausea and vomiting

## 2025-03-24 NOTE — SUBJECTIVE & OBJECTIVE
Past Medical History:   Diagnosis Date    Cervical radiculopathy 10/23/2024    Chronic pancreatitis     she told me this in 2022 when admitted for same thing    Chronic right-sided low back pain with right-sided sciatica 09/26/2024    Crohn's disease     per patient history; no pathology    Essential (primary) hypertension     History of herpes genitalis     Dr. Adrian Harrell    Marijuana dependence     Migraine without aura and without status migrainosus, not intractable 11/22/2024    [facility]    Tobacco dependence        Past Surgical History:   Procedure Laterality Date    CLOSED REDUCTION OF FRACTURE OF NASAL BONE N/A 2/20/2024    Procedure: CLOSED REDUCTION, FRACTURE, NASAL BONE;  Surgeon: Karlos Gallegos MD;  Location: [facility] OR;  Service: ENT;  Laterality: N/A;    CYSTOSCOPY N/A 6/28/2023    Procedure: CYSTOSCOPY;  Surgeon: Christian Jurado MD;  Location: [facility] OR;  Service: OB/GYN;  Laterality: N/A;    HYSTERECTOMY, TOTAL, LAPAROSCOPIC, WITH SALPINGO-OOPHORECTOMY Bilateral 6/28/2023    Procedure: HYSTERECTOMY,TOTAL,LAPAROSCOPIC,WITH SALPINGO-OOPHORECTOMY;  Surgeon: Christian Jurado MD;  Location: [facility] OR;  Service: OB/GYN;  Laterality: Bilateral;    SINUS SURGERY      TONSILLECTOMY AND ADENOIDECTOMY      TUBAL LIGATION         Review of patient's allergies indicates:   Allergen Reactions    Ace inhibitors Swelling    Azithromycin Swelling    Ferrous sulfate Anaphylaxis    Penicillins     Rocephin [ceftriaxone]     Terazol 3 [terconazole] Other (See Comments)     Vaginal irritation and swollen       No current facility-administered medications on file prior to encounter.     Current Outpatient Medications on File Prior to Encounter   Medication Sig    azelastine (ASTELIN) 137 mcg (0.1 %) nasal spray 1 spray (137 mcg total) by Nasal route 2 (two) times daily.    diclofenac (VOLTAREN) 75 MG EC tablet Take 1 tablet (75 mg total) by mouth 2 (two) times daily.     gabapentin (NEURONTIN) 300 MG capsule Take 1 capsule (300 mg total) by mouth 3 (three) times daily... May cause drowsiness    hydroCHLOROthiazide 12.5 MG Tab Take 1 tablet (12.5 mg total) by mouth once daily. for 30 doses    ibuprofen (ADVIL,MOTRIN) 800 MG tablet Take 1 tablet (800 mg total) by mouth 3 (three) times daily as needed for pain.. take with food    loratadine (CLARITIN) 10 mg tablet Take 1 tablet (10 mg total) by mouth once daily. for 30 doses    sertraline (ZOLOFT) 25 MG tablet Take 25 mg by mouth every evening.    [DISCONTINUED] lisinopriL-hydrochlorothiazide (PRINZIDE,ZESTORETIC) 20-25 mg Tab Take 1 tablet by mouth once daily.     Family History       Problem Relation (Age of Onset)    Cancer Mother    Diabetes Mellitus Mother    Hypertension Mother, Child          Tobacco Use    Smoking status: Former     Current packs/day: 0.00     Types: Cigarettes     Quit date: 3/1/2023     Years since quittin.0     Passive exposure: Current    Smokeless tobacco: Never   Substance and Sexual Activity    Alcohol use: Never    Drug use: Not Currently     Types: Marijuana     Comment: stopped 2 months ago    Sexual activity: Not Currently     Partners: Male     Birth control/protection: None     Review of Systems   Gastrointestinal:  Positive for abdominal pain, nausea and vomiting.   Neurological:  Positive for weakness.     Objective:     Vital Signs (Most Recent):  Temp: 98 °F (36.7 °C) (25)  Pulse: 63 (25)  Resp: 20 (25)  BP: 122/71 (25)  SpO2: 98 % (25) Vital Signs (24h Range):  Temp:  [97.2 °F (36.2 °C)-98 °F (36.7 °C)] 98 °F (36.7 °C)  Pulse:  [63-88] 63  Resp:  [18-20] 20  SpO2:  [97 %-100 %] 98 %  BP: (103-133)/(49-74) 122/71     Weight: 73.9 kg (163 lb)  Body mass index is 28.87 kg/m².     Physical Exam  Vitals and nursing note reviewed.   Constitutional:       General: She is not in acute distress.     Appearance: Normal appearance. She is not  ill-appearing.   HENT:      Head: Normocephalic and atraumatic.   Cardiovascular:      Rate and Rhythm: Normal rate.      Heart sounds: No murmur heard.     No friction rub. No gallop.   Pulmonary:      Effort: No respiratory distress.      Breath sounds: No wheezing or rhonchi.   Abdominal:      General: Bowel sounds are normal. There is no distension.      Tenderness: There is abdominal tenderness. There is no guarding or rebound.      Hernia: No hernia is present.   Musculoskeletal:         General: No tenderness.      Right lower leg: No edema.      Left lower leg: No edema.   Neurological:      Mental Status: She is alert.                Significant Labs: All pertinent labs within the past 24 hours have been reviewed.    Significant Imaging: I have reviewed all pertinent imaging results/findings within the past 24 hours.

## 2025-03-24 NOTE — ED PROVIDER NOTES
Encounter Date: 3/23/2025    SCRIBE #1 NOTE: I, Mariah Cerrato, am scribing for, and in the presence of,  Jeff Cheney MD.       History     Chief Complaint   Patient presents with    Diarrhea    Loss of Consciousness     Patient is a 49 year old female with a history of HTN, Crohn's disease and pancreatitis presenting to the ED with complaints of weakness, vomiting and diarrhea for 1 day. She reports while vomiting this afternoon she began to see spots and fell off the toilet where her son found her. She reports having chills since onset. Patient endorses similar symptoms related to Crohn's disease that occur once a month. She denies fever, loss of consciousness or head injury.         Review of patient's allergies indicates:   Allergen Reactions    Ace inhibitors Swelling    Azithromycin Swelling    Ferrous sulfate Anaphylaxis    Penicillins     Rocephin [ceftriaxone]     Terazol 3 [terconazole] Other (See Comments)     Vaginal irritation and swollen     Past Medical History:   Diagnosis Date    Cervical radiculopathy 10/23/2024    Chronic pancreatitis     she told me this in 2022 when admitted for same thing    Chronic right-sided low back pain with right-sided sciatica 09/26/2024    Crohn's disease     per patient history; no pathology    Essential (primary) hypertension     History of herpes genitalis     Dr. Adrian Harrell    Marijuana dependence     Migraine without aura and without status migrainosus, not intractable 11/22/2024    [facility]    Tobacco dependence      Past Surgical History:   Procedure Laterality Date    CLOSED REDUCTION OF FRACTURE OF NASAL BONE N/A 2/20/2024    Procedure: CLOSED REDUCTION, FRACTURE, NASAL BONE;  Surgeon: Karlos Gallegos MD;  Location: [facility] OR;  Service: ENT;  Laterality: N/A;    CYSTOSCOPY N/A 6/28/2023    Procedure: CYSTOSCOPY;  Surgeon: Christian Jurado MD;  Location: [facility] OR;  Service: OB/GYN;  Laterality: N/A;    HYSTERECTOMY,  TOTAL, LAPAROSCOPIC, WITH SALPINGO-OOPHORECTOMY Bilateral 6/28/2023    Procedure: HYSTERECTOMY,TOTAL,LAPAROSCOPIC,WITH SALPINGO-OOPHORECTOMY;  Surgeon: Christian Jurado MD;  Location: [facility];  Service: OB/GYN;  Laterality: Bilateral;    SINUS SURGERY      TONSILLECTOMY AND ADENOIDECTOMY      TUBAL LIGATION       Family History   Problem Relation Name Age of Onset    Hypertension Mother      Diabetes Mellitus Mother      Cancer Mother      Hypertension Child       Social History[1]  Review of Systems   Constitutional:  Positive for chills. Negative for fever.   HENT:  Negative for congestion.    Respiratory:  Negative for cough, chest tightness and shortness of breath.    Gastrointestinal:  Positive for diarrhea, nausea and vomiting. Negative for abdominal pain and blood in stool.   Neurological:  Positive for weakness. Negative for dizziness, syncope, light-headedness and numbness.       Physical Exam     Initial Vitals   BP Pulse Resp Temp SpO2   03/23/25 2044 03/23/25 2043 03/23/25 2044 03/23/25 2044 03/23/25 2043   118/74 77 18 97.2 °F (36.2 °C) 99 %      MAP       --                Physical Exam    Nursing note and vitals reviewed.  HENT:   Head: Normocephalic and atraumatic. Mouth/Throat: Oropharynx is clear and moist.   Eyes: Pupils are equal, round, and reactive to light.   Neck: Neck supple.   Normal range of motion.  Cardiovascular:  Normal rate and regular rhythm.           Pulmonary/Chest: Effort normal and breath sounds normal.   Abdominal: Abdomen is soft. Bowel sounds are normal. She exhibits no distension. There is no abdominal tenderness.   Musculoskeletal:         General: Normal range of motion.      Cervical back: Normal range of motion and neck supple.     Neurological: She is alert and oriented to person, place, and time. She has normal strength.   Skin: Skin is warm. Capillary refill takes less than 2 seconds.   Psychiatric: She has a normal mood and affect.         ED Course    Procedures  Labs Reviewed   COMPREHENSIVE METABOLIC PANEL - Abnormal       Result Value    Sodium 137      Potassium 2.9 (*)     Chloride 102      CO2 19 (*)     Anion Gap 19 (*)     Glucose 145 (*)     BUN 13      Creatinine 0.82      BUN/Creatinine Ratio 16      Calcium 9.3      Total Protein 7.6      Albumin 4.2      Globulin 3.4      A/G Ratio 1.2      Bilirubin, Total 0.4      Alk Phos 123      ALT 13      AST 30      eGFR 88     URINALYSIS, REFLEX TO URINE CULTURE - Abnormal    Color, UA Yellow      Clarity, UA Clear      pH, UA 5.5      Leukocytes, UA Negative      Nitrites, UA Negative      Protein, UA Negative      Glucose, UA Normal      Ketones, UA Negative      Urobilinogen, UA Normal      Bilirubin, UA Negative      Blood, UA Large (*)     Specific Gravity, UA 1.023     CBC WITH DIFFERENTIAL - Abnormal    WBC 19.72 (*)     RBC 4.84      Hemoglobin 12.8      Hematocrit 40.1      MCV 82.9      MCH 26.4 (*)     MCHC 31.9 (*)     RDW 13.8      Platelet Count 261      MPV 10.4      Neutrophils % 81.9 (*)     Lymphocytes % 12.1 (*)     Monocytes % 4.7      Eosinophils % 0.3 (*)     Basophils % 0.4      Immature Granulocytes % 0.6 (*)     nRBC, Auto 0.0      Neutrophils, Abs 16.17 (*)     Lymphocytes, Absolute 2.39      Monocytes, Absolute 0.92 (*)     Eosinophils, Absolute 0.05      Basophils, Absolute 0.07      Immature Granulocytes, Absolute 0.12 (*)     nRBC, Absolute 0.00      Diff Type Auto     URINALYSIS, MICROSCOPIC - Abnormal    WBC, UA 2      RBC, UA 7 (*)     Bacteria, UA Few (*)     Squamous Epithelial Cells, UA Occasional (*)     Mucous Occasional (*)    DRUG SCREEN, URINE (BEAKER) - Abnormal    Barbiturates, Urine Negative      Benzodiazepine, Urine Negative      Opiates, Urine Negative      Phencyclidine, Urine Negative      Amphetamine, Urine Negative      Cannabinoid, Urine Positive (*)     Cocaine, Urine Negative      Narrative:     This screen includes the following classes of drugs at the  listed cut-off:    Benzodiazepines 200 ng/ml  Cocaine metabolite 300 ng/ml  Opiates 2000 ng/ml  Barbiturates 200 ng/ml  Amphetamines 500 ng/ml  Marijuana metabs (THC) 50 ng/ml  Phencyclidine (PCP) 25 ng/ml    This is a screening test. If results do not correlate with clinical presentation, then a confirmatory send out test is advised.   POCT OCCULT BLOOD (STOOL) - Abnormal    Fecal Occult Blood Positive     LIPASE - Normal    Lipase 25     MAGNESIUM - Normal    Magnesium 1.9     CLOSTRIDIOIDES DIFFICILE TOXIN/ANTIGEN WITH REFLEX TO PCR   ENTERIC PATHOGEN PANEL   CULTURE, BLOOD   CULTURE, BLOOD   CBC W/ AUTO DIFFERENTIAL    Narrative:     The following orders were created for panel order CBC W/ AUTO DIFFERENTIAL.  Procedure                               Abnormality         Status                     ---------                               -----------         ------                     CBC with Differential[[id number]]       Abnormal            Final result                 Please view results for these tests on the individual orders.          Imaging Results              CT Abdomen Pelvis With IV Contrast NO Oral Contrast (Final result)  Result time 03/24/25 00:13:51      Final result by Lyle Curran MD (03/24/25 00:13:51)                   Impression:      1. No acute abnormality  2. Small hiatal hernia.      Electronically signed by: Lyle Curran  Date:    03/24/2025  Time:    00:13               Narrative:    EXAMINATION:  CT ABDOMEN PELVIS WITH IV CONTRAST    CLINICAL HISTORY:  Abdominal abscess/infection suspected;    TECHNIQUE:  Low dose axial images, sagittal and coronal reformations were obtained from the lung bases to the pubic symphysis following the IV administration of 65 mL of Isovue 370 .  Oral contrast was not administered.    COMPARISON:  12/03/2022    FINDINGS:  Abdomen:    - Lower thorax:Small hiatal hernia.    - Lung bases: No infiltrates and no nodules.    - Liver: No focal mass.    -  Gallbladder: No calcified gallstones.    - Bile Ducts: No evidence of intra or extra hepatic biliary ductal dilation.    - Spleen: Negative.    - Kidneys: No mass or hydronephrosis.    - Adrenals: Unremarkable.    - Pancreas: No mass or peripancreatic fat stranding.    - Retroperitoneum:  No significant adenopathy.    - Vascular: No abdominal aortic aneurysm.    - Abdominal wall:  Unremarkable.    The appendix is within normal limits.    Pelvis:    No pelvic mass, adenopathy, or free fluid.    Bowel/Mesentery:    No evidence of bowel obstruction or inflammation.    Bones:  No acute osseous abnormality and no suspicious lytic or blastic lesion.    Moderate disc space narrowing and vacuum disc phenomena at L5-S1.  Moderate bilateral foraminal narrowing.                                       X-Ray Chest AP Portable (In process)  Result time 03/23/25 22:01:26                     Medications   acetaminophen suppository 650 mg (has no administration in time range)   acetaminophen tablet 1,000 mg (has no administration in time range)   aluminum & magnesium hydroxide-simethicone 400-400-40 mg/5 mL suspension 30 mL (has no administration in time range)   bisacodyL EC tablet 10 mg (has no administration in time range)   dextromethorphan-guaiFENesin  mg/5 ml liquid 10 mL (has no administration in time range)   diphenhydrAMINE capsule 50 mg (has no administration in time range)   docusate sodium capsule 100 mg (has no administration in time range)   melatonin tablet 6 mg (has no administration in time range)   traZODone tablet 50 mg (has no administration in time range)   0.9 % NaCl with KCl 20 mEq infusion ( Intravenous New Bag 3/24/25 9539)   metoclopramide injection 10 mg (has no administration in time range)   morphine injection 4 mg (4 mg Intravenous Given 3/24/25 0326)   oxyCODONE immediate release tablet 5 mg (has no administration in time range)   sodium chloride 0.9% bolus 1,000 mL 1,000 mL (0 mLs Intravenous  Stopped 3/23/25 2215)   promethazine (PHENERGAN) 25 mg in 0.9% NaCl 50 mL IVPB (0 mg Intravenous Stopped 3/23/25 2359)   iopamidoL (ISOVUE-370) injection 100 mL (65 mLs Intravenous Given 3/23/25 2307)   potassium chloride SA CR tablet 40 mEq (40 mEq Oral Given 3/23/25 2345)   potassium chloride 10 mEq in 100 mL IVPB (0 mEq Intravenous Stopped 3/24/25 0132)   0.9% NaCl infusion (0 mLs Intravenous Stopped 3/24/25 0132)   0.9% NaCl infusion (  Override Pull 3/24/25 0030)   ondansetron injection 4 mg (4 mg Intravenous Given 3/24/25 0047)   morphine injection 4 mg (4 mg Intravenous Given 3/24/25 0047)     Medical Decision Making  Amount and/or Complexity of Data Reviewed  Labs: ordered.  Radiology: ordered.    Risk  Prescription drug management.               ED Course as of 03/24/25 0514   Sun Mar 23, 2025   2048 Medical decision-making:  Differential diagnosis includes nausea, vomiting, diarrhea, generalized weakness, near-syncope, gastroenteritis, C difficile.  All testing ordered and interpreted by me. [BB]   2148 White blood count is elevated at 19.7. [BB]   2219 CMP shows hypokalemia, low bicarb consistent with patient's dehydration, otherwise unremarkable.  Magnesium is normal. [BB]   2319 Venous blood gas shows normal pH, [BB]   2323 Venous blood gas was contained but is completely inconsistent with patient's other lab results and will be disregarded. [BB]   2324 Awaiting CT results. [BB]   2324 Chest x-ray by my interpretation shows no acute infiltrate. [BB]   2325 Urinalysis is normal. [BB]   2352 Stool is Hemoccult positive but normal color, no gross blood. [BB]   Mon Mar 24, 2025   0017 CT abdomen shows small hiatal hernia, no acute abnormality noted. [BB]   0020 On repeat exam patient is still complaining of crampy pain, continued nausea and vomiting.  Because of this I made the decision to admit patient and discussed case with internal medicine hospitalist on-call who agrees with admission. [BB]      ED  Course User Index  [BB] Jeff Cheney MD                           Clinical Impression:  Final diagnoses:  [R11.2] Nausea and vomiting, unspecified vomiting type (Primary)  [R19.7] Diarrhea, unspecified type  [R55] Near syncope  [R53.1] Generalized weakness  [K50.90] Crohn disease  [R11.2] Intractable nausea and vomiting          ED Disposition Condition    Observation                     [1]   Social History  Tobacco Use    Smoking status: Former     Current packs/day: 0.00     Types: Cigarettes     Quit date: 3/1/2023     Years since quittin.0     Passive exposure: Current    Smokeless tobacco: Never   Substance Use Topics    Alcohol use: Never    Drug use: Not Currently     Types: Marijuana     Comment: stopped 2 months ago        Jeff Cheney MD  25 0514

## 2025-03-24 NOTE — TRANSFER OF CARE
"Anesthesia Transfer of Care Note    Patient: Martita Hilliard    Procedure(s) Performed: * No procedures listed *    Patient location: GI    Anesthesia Type: general    Transport from OR: Transported from OR on room air with adequate spontaneous ventilation    Post pain: adequate analgesia    Post assessment: no apparent anesthetic complications    Post vital signs: stable    Level of consciousness: responds to stimulation    Nausea/Vomiting: no nausea/vomiting    Complications: none    Transfer of care protocol was followed      Last vitals: Visit Vitals  BP (!) 98/52 (BP Location: Left arm, Patient Position: Lying)   Pulse 78   Temp 36.1 °C (97 °F) (Oral)   Resp 14   Ht 5' 3" (1.6 m)   Wt 70.8 kg (156 lb)   LMP 06/24/2023 (Exact Date)   SpO2 98%   Breastfeeding No   BMI 27.63 kg/m²     "

## 2025-03-24 NOTE — ASSESSMENT & PLAN NOTE
3/24  - CT abdominal/pelvic showed no acute abnormalities  - Leukocytosis of 19.72; Anion gap 19  - Blood cultures pending  -Fecal occult blood (+); recommend to consult GI for evaluation(if  indicated)  H/H unremarkable  -Enteric panel and C. Diff ordered  -Started on IV metoclopramide 10 mg q8h  - IV NS with KCL 20 mEq at 125 cc for hypokalemia  -Cannabinoid (+)  -Will not start on AG; risk of Jyothi Ferris Tear; will use LEXI hose for DVT prophylaxis   - NPO  -Will continue to monitor

## 2025-03-24 NOTE — ANESTHESIA PREPROCEDURE EVALUATION
2025  Martita Hilliard is a 49 y.o., female.    Social History     Socioeconomic History    Marital status: Single   Tobacco Use    Smoking status: Former     Current packs/day: 0.00     Types: Cigarettes     Quit date: 3/1/2023     Years since quittin.0     Passive exposure: Current    Smokeless tobacco: Never   Substance and Sexual Activity    Alcohol use: Never    Drug use: Yes     Types: Marijuana     Comment: stopped 2 months ago    Sexual activity: Not Currently     Partners: Male     Birth control/protection: None      Pre-op Assessment    I have reviewed the Patient Summary Reports.     I have reviewed the Nursing Notes. I have reviewed the NPO Status.   I have reviewed the Medications.     Review of Systems  Anesthesia Hx:  No problems with previous Anesthesia                Social:  Former Smoker       Cardiovascular:     Hypertension                                          Neurological:    Neuromuscular Disease,  Headaches                                 Psych:  Psychiatric History                Past Medical History:   Diagnosis Date    Cervical radiculopathy 10/23/2024    Chronic pancreatitis     she told me this in  when admitted for same thing    Chronic right-sided low back pain with right-sided sciatica 2024    Crohn's disease     per patient history; no pathology    Degenerative disorder of bone     Essential (primary) hypertension     History of herpes genitalis     Dr. Adrian Harrell    Marijuana dependence     Migraine without aura and without status migrainosus, not intractable 2024    [facility]    Tobacco dependence       Past Surgical History:   Procedure Laterality Date    CLOSED REDUCTION OF FRACTURE OF NASAL BONE N/A 2024    Procedure: CLOSED REDUCTION, FRACTURE, NASAL BONE;  Surgeon: Karlos Gallegos MD;  Location: [facility];   Service: ENT;  Laterality: N/A;    CYSTOSCOPY N/A 6/28/2023    Procedure: CYSTOSCOPY;  Surgeon: Christian Jurado MD;  Location: [facility];  Service: OB/GYN;  Laterality: N/A;    HYSTERECTOMY, TOTAL, LAPAROSCOPIC, WITH SALPINGO-OOPHORECTOMY Bilateral 6/28/2023    Procedure: HYSTERECTOMY,TOTAL,LAPAROSCOPIC,WITH SALPINGO-OOPHORECTOMY;  Surgeon: Christian Jurado MD;  Location: [facility] OR;  Service: OB/GYN;  Laterality: Bilateral;    SINUS SURGERY      TONSILLECTOMY AND ADENOIDECTOMY      TUBAL LIGATION          Physical Exam  General: Well nourished, Cooperative, Alert and Oriented    Airway:  Mallampati: II     Chest/Lungs:  Clear to auscultation    Heart:  Rate: Normal        Anesthesia Plan  Type of Anesthesia, risks & benefits discussed:    Anesthesia Type: Gen Natural Airway, MAC  Intra-op Monitoring Plan: Standard ASA Monitors  Post Op Pain Control Plan: multimodal analgesia and IV/PO Opioids PRN  Induction:  IV  Informed Consent: Informed consent signed with the Patient and all parties understand the risks and agree with anesthesia plan.  All questions answered. Patient consented to blood products? Yes  ASA Score: 3  Day of Surgery Review of History & Physical: I have interviewed and examined the patient. I have reviewed the patient's H&P dated: There are no significant changes.     Ready For Surgery From Anesthesia Perspective.     .

## 2025-03-24 NOTE — HPI
Patient is a 49-year-old female who presents to the [facility] Emergency Department by ambulance for intractable emesis that started four-days ago. Associated symptoms are nausea, diarrhea, chest discomfort ,dyspnea and generalized weakness. Patient reports that she have had multiple hospital visits for the past few months with similar symptoms. Today, she experienced 4 episodes of emesis and non-watery diarrhea( soft stool). Her son found her on the bathroom floor. Denied fall, injury or LOC. She was on the floor for at least 3-hours from feeling weakness and fatigue.     Patient was seen in the [facility] last week to establish care with Dr. Stephens and treated for hypertensive. She mentioned having history of pancreatitis and kimberley diease diagnosed a few years ago; no records of health problems noted in the chart. She follows Dr. Hirsch for pain management related to her cervical radiculopathy and Dr. Aguirre for her cluster headaches. Patient does smoke marijuana to help with her appetite.     Patient have a past medical history of HTN, Sciatica, Cervical radiculopathy, Cannabinoid use, and HA     In the Emergency Department, initial presenting labs were  CBC  19.72 12.8 261    <15     Coag                 BMP  137 102 13 145   2.9 19 0.82     CaMgPhos  9.3   1.9          Vitals: Afebrile; HR 68; /74;RR 18; SpO2 99%  Labs: Anion gap 19; Blood glucose 145 mg/dL; (+) Cannabinoid; UA showed few bacteria  Orders: IV Morphine 4 mg; IV Ondansetron 4 mg ; IV Potassium 10 mEq IVPB; PO Potassium 40 mEq; IV Phenergan 25 mg;IV NS 1 liter bolus  Imaging: CT abdomen/pelvic showed no acute abnormalities; small hiatal hernia     The patient was admitted to the [facility] Medicine Team under the direct supervision of Attending Physician for the continued care and medical management for this patient.

## 2025-03-24 NOTE — H&P
[facility] Tele[facility] Hospital Medicine  History & Physical    Patient Name: Martita Hilliard  MRN: [id number]  Patient Class: OP- Observation  Admission Date: 3/23/2025  Attending Physician: Michelle Price MD   Primary Care Provider: Jr. Guillermo Stephens MD         Patient information was obtained from patient and ER records.     Subjective:     Principal Problem:Intractable nausea and vomiting    Chief Complaint:   Chief Complaint   Patient presents with    Diarrhea    Loss of Consciousness        HPI: Patient is a 49-year-old female who presents to the [facility] Emergency Department by ambulance for intractable emesis that started four-days ago. Associated symptoms are nausea, diarrhea, chest discomfort ,dyspnea and generalized weakness. Patient reports that she have had multiple hospital visits for the past few months with similar symptoms. Today, she experienced 4 episodes of emesis and non-watery diarrhea( soft stool). Her son found her on the bathroom floor. Denied fall, injury or LOC. She was on the floor for at least 3-hours from feeling weakness and fatigue.     Patient was seen in the [facility] last week to establish care with Dr. Stephens and treated for hypertensive. She mentioned having history of pancreatitis and kimberley diease diagnosed a few years ago; no records of health problems noted in the chart. She follows Dr. Hirsch for pain management related to her cervical radiculopathy and Dr. Aguirre for her cluster headaches. Patient does smoke marijuana to help with her appetite.     Patient have a past medical history of HTN, Sciatica, Cervical radiculopathy, Cannabinoid use, and HA     In the Emergency Department, initial presenting labs were  CBC  19.72 12.8 261    <15     Coag                 BMP  137 102 13 145   2.9 19 0.82     CaMgPhos  9.3   1.9          Vitals: Afebrile; HR 68; /74;RR 18; SpO2 99%  Labs: Anion gap 19; Blood glucose 145 mg/dL; (+) Cannabinoid; UA  showed few bacteria  Orders: IV Morphine 4 mg; IV Ondansetron 4 mg ; IV Potassium 10 mEq IVPB; PO Potassium 40 mEq; IV Phenergan 25 mg;IV NS 1 liter bolus  Imaging: CT abdomen/pelvic showed no acute abnormalities; small hiatal hernia     The patient was admitted to the [facility] Medicine Team under the direct supervision of Attending Physician for the continued care and medical management for this patient.            Past Medical History:   Diagnosis Date    Cervical radiculopathy 10/23/2024    Chronic pancreatitis     she told me this in 2022 when admitted for same thing    Chronic right-sided low back pain with right-sided sciatica 09/26/2024    Crohn's disease     per patient history; no pathology    Essential (primary) hypertension     History of herpes genitalis     Dr. Adrian Harrell    Marijuana dependence     Migraine without aura and without status migrainosus, not intractable 11/22/2024    [facility]    Tobacco dependence        Past Surgical History:   Procedure Laterality Date    CLOSED REDUCTION OF FRACTURE OF NASAL BONE N/A 2/20/2024    Procedure: CLOSED REDUCTION, FRACTURE, NASAL BONE;  Surgeon: Karlos Gallegos MD;  Location: [facility] OR;  Service: ENT;  Laterality: N/A;    CYSTOSCOPY N/A 6/28/2023    Procedure: CYSTOSCOPY;  Surgeon: Christian Jurado MD;  Location: [facility] OR;  Service: OB/GYN;  Laterality: N/A;    HYSTERECTOMY, TOTAL, LAPAROSCOPIC, WITH SALPINGO-OOPHORECTOMY Bilateral 6/28/2023    Procedure: HYSTERECTOMY,TOTAL,LAPAROSCOPIC,WITH SALPINGO-OOPHORECTOMY;  Surgeon: Christian Jurado MD;  Location: [facility] OR;  Service: OB/GYN;  Laterality: Bilateral;    SINUS SURGERY      TONSILLECTOMY AND ADENOIDECTOMY      TUBAL LIGATION         Review of patient's allergies indicates:   Allergen Reactions    Ace inhibitors Swelling    Azithromycin Swelling    Ferrous sulfate Anaphylaxis    Penicillins     Rocephin [ceftriaxone]     Terazol 3 [terconazole] Other (See  Comments)     Vaginal irritation and swollen       No current facility-administered medications on file prior to encounter.     Current Outpatient Medications on File Prior to Encounter   Medication Sig    azelastine (ASTELIN) 137 mcg (0.1 %) nasal spray 1 spray (137 mcg total) by Nasal route 2 (two) times daily.    diclofenac (VOLTAREN) 75 MG EC tablet Take 1 tablet (75 mg total) by mouth 2 (two) times daily.    gabapentin (NEURONTIN) 300 MG capsule Take 1 capsule (300 mg total) by mouth 3 (three) times daily... May cause drowsiness    hydroCHLOROthiazide 12.5 MG Tab Take 1 tablet (12.5 mg total) by mouth once daily. for 30 doses    ibuprofen (ADVIL,MOTRIN) 800 MG tablet Take 1 tablet (800 mg total) by mouth 3 (three) times daily as needed for pain.. take with food    loratadine (CLARITIN) 10 mg tablet Take 1 tablet (10 mg total) by mouth once daily. for 30 doses    sertraline (ZOLOFT) 25 MG tablet Take 25 mg by mouth every evening.    [DISCONTINUED] lisinopriL-hydrochlorothiazide (PRINZIDE,ZESTORETIC) 20-25 mg Tab Take 1 tablet by mouth once daily.     Family History       Problem Relation (Age of Onset)    Cancer Mother    Diabetes Mellitus Mother    Hypertension Mother, Child          Tobacco Use    Smoking status: Former     Current packs/day: 0.00     Types: Cigarettes     Quit date: 3/1/2023     Years since quittin.0     Passive exposure: Current    Smokeless tobacco: Never   Substance and Sexual Activity    Alcohol use: Never    Drug use: Not Currently     Types: Marijuana     Comment: stopped 2 months ago    Sexual activity: Not Currently     Partners: Male     Birth control/protection: None     Review of Systems   Gastrointestinal:  Positive for abdominal pain, nausea and vomiting.   Neurological:  Positive for weakness.     Objective:     Vital Signs (Most Recent):  Temp: 98 °F (36.7 °C) (25)  Pulse: 63 (25)  Resp: 20 (25)  BP: 122/71 (25)  SpO2: 98 %  (03/24/25 0219) Vital Signs (24h Range):  Temp:  [97.2 °F (36.2 °C)-98 °F (36.7 °C)] 98 °F (36.7 °C)  Pulse:  [63-88] 63  Resp:  [18-20] 20  SpO2:  [97 %-100 %] 98 %  BP: (103-133)/(49-74) 122/71     Weight: 73.9 kg (163 lb)  Body mass index is 28.87 kg/m².     Physical Exam  Vitals and nursing note reviewed.   Constitutional:       General: She is not in acute distress.     Appearance: Normal appearance. She is not ill-appearing.   HENT:      Head: Normocephalic and atraumatic.   Cardiovascular:      Rate and Rhythm: Normal rate.      Heart sounds: No murmur heard.     No friction rub. No gallop.   Pulmonary:      Effort: No respiratory distress.      Breath sounds: No wheezing or rhonchi.   Abdominal:      General: Bowel sounds are normal. There is no distension.      Tenderness: There is abdominal tenderness. There is no guarding or rebound.      Hernia: No hernia is present.   Musculoskeletal:         General: No tenderness.      Right lower leg: No edema.      Left lower leg: No edema.   Neurological:      Mental Status: She is alert.                Significant Labs: All pertinent labs within the past 24 hours have been reviewed.    Significant Imaging: I have reviewed all pertinent imaging results/findings within the past 24 hours.  Assessment/Plan:     * Intractable nausea and vomiting  3/24  - CT abdominal/pelvic showed no acute abnormalities  - Leukocytosis of 19.72; Anion gap 19  - Blood cultures pending  -Fecal occult blood (+); recommend to consult GI for evaluation(if  indicated)  H/H unremarkable  -Enteric panel and C. Diff ordered  -Started on IV metoclopramide 10 mg q8h  - IV NS with KCL 20 mEq at 125 cc for hypokalemia  -Cannabinoid (+)  -Will not start on AG; risk of Jyothi Ferris Tear; will use LEXI hose for DVT prophylaxis   - NPO  -Will continue to monitor       Hypokalemia  Patient's most recent potassium results are listed below.   Recent Labs     03/23/25  2131   K 2.9*     Plan  - Replete  potassium per protocol  - Monitor potassium Daily  - Patient's hypokalemia is stable  - Started on IV NS with KCL 20 mEq 125 cc  -Continue to monitor       VTE Risk Mitigation (From admission, onward)           Ordered     Place LEXI hose  Until discontinued         03/24/25 0128                           On 03/24/2025, patient should be placed in hospital observation services under my care in collaboration with Dr. Janet Reynoso MD .         Jay South MD  Department of Hospital Medicine  [facility] - [facility] Tele[facility]

## 2025-03-24 NOTE — CARE UPDATE
The patient was seen this morning. We have ordered Protonix 40 mg twice daily and have requested a consult with Gastroenterology due to the patient's report of tarry stools. We will continue to monitor and stabilize her condition. The patient will remain on a 0.9% NaCl with KCl 20 mEq infusion as part of her ongoing management.

## 2025-03-24 NOTE — ASSESSMENT & PLAN NOTE
Patient's most recent potassium results are listed below.   Recent Labs     03/23/25  2131   K 2.9*     Plan  - Replete potassium per protocol  - Monitor potassium Daily  - Patient's hypokalemia is stable  - Started on IV NS with KCL 20 mEq 125 cc  -Continue to monitor

## 2025-03-25 ENCOUNTER — RESULTS FOLLOW-UP (OUTPATIENT)
Dept: GASTROENTEROLOGY | Facility: HOSPITAL | Age: 50
End: 2025-03-25

## 2025-03-25 VITALS
SYSTOLIC BLOOD PRESSURE: 126 MMHG | WEIGHT: 155.44 LBS | RESPIRATION RATE: 14 BRPM | OXYGEN SATURATION: 98 % | HEIGHT: 63 IN | TEMPERATURE: 98 F | HEART RATE: 75 BPM | DIASTOLIC BLOOD PRESSURE: 79 MMHG | BODY MASS INDEX: 27.54 KG/M2

## 2025-03-25 PROBLEM — K29.01 ACUTE HEMORRHAGIC GASTRITIS: Status: ACTIVE | Noted: 2025-03-25

## 2025-03-25 LAB
ALBUMIN SERPL BCP-MCNC: 3.2 G/DL (ref 3.5–5)
ALBUMIN/GLOB SERPL: 1.2 {RATIO}
ALP SERPL-CCNC: 89 U/L (ref 40–150)
ALT SERPL W P-5'-P-CCNC: 9 U/L
ANION GAP SERPL CALCULATED.3IONS-SCNC: 8 MMOL/L (ref 7–16)
AST SERPL W P-5'-P-CCNC: 19 U/L (ref 11–45)
BASOPHILS # BLD AUTO: 0.07 K/UL (ref 0–0.2)
BASOPHILS NFR BLD AUTO: 0.8 % (ref 0–1)
BILIRUB SERPL-MCNC: 0.5 MG/DL
BUN SERPL-MCNC: 7 MG/DL (ref 7–19)
BUN/CREAT SERPL: 10 (ref 6–20)
CALCIUM SERPL-MCNC: 8.5 MG/DL (ref 8.4–10.2)
CHLORIDE SERPL-SCNC: 111 MMOL/L (ref 98–107)
CO2 SERPL-SCNC: 24 MMOL/L (ref 22–29)
CREAT SERPL-MCNC: 0.71 MG/DL (ref 0.55–1.02)
DIFFERENTIAL METHOD BLD: ABNORMAL
EGFR (NO RACE VARIABLE) (RUSH/TITUS): 104 ML/MIN/1.73M2
EOSINOPHIL # BLD AUTO: 0.11 K/UL (ref 0–0.5)
EOSINOPHIL NFR BLD AUTO: 1.3 % (ref 1–4)
ERYTHROCYTE [DISTWIDTH] IN BLOOD BY AUTOMATED COUNT: 14.4 % (ref 11.5–14.5)
ESTROGEN SERPL-MCNC: NORMAL PG/ML
GLOBULIN SER-MCNC: 2.6 G/DL (ref 2–4)
GLUCOSE SERPL-MCNC: 80 MG/DL (ref 74–100)
HCT VFR BLD AUTO: 35.4 % (ref 38–47)
HGB BLD-MCNC: 10.9 G/DL (ref 12–16)
IMM GRANULOCYTES # BLD AUTO: 0.01 K/UL (ref 0–0.04)
IMM GRANULOCYTES NFR BLD: 0.1 % (ref 0–0.4)
INSULIN SERPL-ACNC: NORMAL U[IU]/ML
LAB AP GROSS DESCRIPTION: NORMAL
LAB AP LABORATORY NOTES: NORMAL
LYMPHOCYTES # BLD AUTO: 2.91 K/UL (ref 1–4.8)
LYMPHOCYTES NFR BLD AUTO: 33.8 % (ref 27–41)
MCH RBC QN AUTO: 26.1 PG (ref 27–31)
MCHC RBC AUTO-ENTMCNC: 30.8 G/DL (ref 32–36)
MCV RBC AUTO: 84.9 FL (ref 80–96)
MONOCYTES # BLD AUTO: 0.45 K/UL (ref 0–0.8)
MONOCYTES NFR BLD AUTO: 5.2 % (ref 2–6)
MPC BLD CALC-MCNC: 10.5 FL (ref 9.4–12.4)
NEUTROPHILS # BLD AUTO: 5.07 K/UL (ref 1.8–7.7)
NEUTROPHILS NFR BLD AUTO: 58.8 % (ref 53–65)
NRBC # BLD AUTO: 0 X10E3/UL
NRBC, AUTO (.00): 0 %
PLATELET # BLD AUTO: 211 K/UL (ref 150–400)
POTASSIUM SERPL-SCNC: 3.9 MMOL/L (ref 3.5–5.1)
PROT SERPL-MCNC: 5.8 G/DL (ref 6.4–8.3)
RBC # BLD AUTO: 4.17 M/UL (ref 4.2–5.4)
SODIUM SERPL-SCNC: 139 MMOL/L (ref 136–145)
T3RU NFR SERPL: NORMAL %
WBC # BLD AUTO: 8.62 K/UL (ref 4.5–11)

## 2025-03-25 PROCEDURE — 96376 TX/PRO/DX INJ SAME DRUG ADON: CPT

## 2025-03-25 PROCEDURE — 80053 COMPREHEN METABOLIC PANEL: CPT | Performed by: FAMILY MEDICINE

## 2025-03-25 PROCEDURE — G0378 HOSPITAL OBSERVATION PER HR: HCPCS

## 2025-03-25 PROCEDURE — 63600175 PHARM REV CODE 636 W HCPCS: Performed by: HOSPITALIST

## 2025-03-25 PROCEDURE — 63600175 PHARM REV CODE 636 W HCPCS

## 2025-03-25 PROCEDURE — 99239 HOSP IP/OBS DSCHRG MGMT >30: CPT | Mod: GC,,, | Performed by: FAMILY MEDICINE

## 2025-03-25 PROCEDURE — 36415 COLL VENOUS BLD VENIPUNCTURE: CPT | Performed by: FAMILY MEDICINE

## 2025-03-25 PROCEDURE — 85025 COMPLETE CBC W/AUTO DIFF WBC: CPT | Performed by: FAMILY MEDICINE

## 2025-03-25 RX ORDER — TETRACYCLINE HYDROCHLORIDE 500 MG/1
500 CAPSULE ORAL 4 TIMES DAILY
Qty: 56 CAPSULE | Refills: 0 | Status: SHIPPED | OUTPATIENT
Start: 2025-03-25 | End: 2025-04-10

## 2025-03-25 RX ORDER — METRONIDAZOLE 500 MG/1
500 TABLET ORAL 3 TIMES DAILY
Qty: 42 TABLET | Refills: 0 | Status: SHIPPED | OUTPATIENT
Start: 2025-03-25 | End: 2025-04-10

## 2025-03-25 RX ORDER — BISMUTH SUBSALICYLATE 262 MG/1
1 TABLET ORAL 4 TIMES DAILY
COMMUNITY
Start: 2025-03-25 | End: 2025-04-08

## 2025-03-25 RX ORDER — ONDANSETRON 4 MG/1
4 TABLET, ORALLY DISINTEGRATING ORAL EVERY 8 HOURS PRN
Qty: 10 TABLET | Refills: 0 | Status: SHIPPED | OUTPATIENT
Start: 2025-03-25 | End: 2025-04-04

## 2025-03-25 RX ORDER — PANTOPRAZOLE SODIUM 40 MG/1
40 TABLET, DELAYED RELEASE ORAL 2 TIMES DAILY
Qty: 60 TABLET | Refills: 0 | Status: CANCELLED | OUTPATIENT
Start: 2025-03-25 | End: 2025-04-24

## 2025-03-25 RX ORDER — PANTOPRAZOLE SODIUM 40 MG/1
40 TABLET, DELAYED RELEASE ORAL 2 TIMES DAILY
Qty: 60 TABLET | Refills: 0 | Status: SHIPPED | OUTPATIENT
Start: 2025-03-25 | End: 2025-04-24

## 2025-03-25 RX ADMIN — PANTOPRAZOLE SODIUM 40 MG: 40 INJECTION, POWDER, FOR SOLUTION INTRAVENOUS at 09:03

## 2025-03-25 RX ADMIN — METOCLOPRAMIDE HYDROCHLORIDE 10 MG: 5 INJECTION INTRAMUSCULAR; INTRAVENOUS at 06:03

## 2025-03-25 RX ADMIN — POTASSIUM CHLORIDE AND SODIUM CHLORIDE: 900; 150 INJECTION, SOLUTION INTRAVENOUS at 02:03

## 2025-03-25 NOTE — ASSESSMENT & PLAN NOTE
Patient's hemorrhage is due to gastrointestinal bleed, this bleeding is not associated with a medication or a coagulopathy. Patients most recent Hgb, Hct, platelets, and INR are listed below.  Recent Labs     03/23/25  2131 03/23/25  2309 03/25/25  0721   HGB 12.8  --  10.9*   HCT 40.1 <15* 35.4*     --  211     Plan  - Will trend hemoglobin/hematocrit Daily  - Will monitor and correct any coagulation defects  - Will transfuse if Hgb is <7g/dl (<8g/dl in cases of active ACS) or if patient has rapid bleeding leading to hemodynamic instability    3/25  Avoid NSAIDs  Start PO protonix 40 mg BID  Follow up with gastroenterology in 2 weeks

## 2025-03-25 NOTE — NURSING
Pt left floor with transport. IV's taken out, discharge orders were reviewed and sent with pt. All patient belongings left with pt. Medications were dropped off in the room by pharmacy and taken with pt.

## 2025-03-25 NOTE — PLAN OF CARE
Problem: Adult Inpatient Plan of Care  Goal: Optimal Comfort and Wellbeing  Outcome: Progressing  Intervention: Monitor Pain and Promote Comfort  Flowsheets (Taken 3/24/2025 2101)  Pain Management Interventions:   warm blanket provided   relaxation techniques promoted   quiet environment facilitated   position adjusted   pillow support provided  Intervention: Provide Person-Centered Care  Flowsheets (Taken 3/24/2025 2101)  Trust Relationship/Rapport:   care explained   questions encouraged   choices provided   reassurance provided   emotional support provided   thoughts/feelings acknowledged   questions answered     Problem: Nausea and Vomiting  Goal: Nausea and Vomiting Relief  Outcome: Progressing  Intervention: Prevent and Manage Nausea and Vomiting  Flowsheets (Taken 3/24/2025 2101)  Environmental Support:   calm environment promoted   rest periods encouraged  Oral Care: oral rinse provided

## 2025-03-25 NOTE — HOSPITAL COURSE
Reason for Admission:  Intractable emesis, nausea, diarrhea, chest discomfort, dyspnea, and generalized weakness.    History of Present Illness:  The patient is a 49-year-old female who presented to the [facility] Emergency Department by ambulance with complaints of intractable emesis that began four days ago. The patient reported experiencing associated symptoms including nausea, diarrhea, chest discomfort, dyspnea, and generalized weakness. She mentioned multiple prior hospital visits over the past few months for similar symptoms. Today, she experienced 4 episodes of emesis and non-watery diarrhea (soft stools). Her son found her on the bathroom floor, but she denies any fall, injury, or loss of consciousness. She remained on the floor for at least 3 hours due to weakness and fatigue.  The patient had been seen at the [facility] last week to establish care with Dr. Stephens and was treated for hypertension. She reported a history of pancreatitis and Crohn's disease diagnosed a few years ago, although there are no available medical records for these conditions. The patient is also under the care of Dr. Hirsch for cervical radiculopathy and Dr. Aguirre for her cluster headaches. She uses marijuana to help with her appetite. The patient's medical history includes hypertension (HTN), sciatica, cervical radiculopathy, cannabinoid use, and headaches.    Procedure Summary:  Date of Procedure: 3/24/25  The patient underwent an upper gastrointestinal endoscopy for evaluation of chronic nausea, vomiting, and melena. The findings were as follows:  Esophagus: Appeared normal.  Stomach: Abnormal mucosa in the fundus of the stomach, for which cold forceps biopsy was performed. Forceps biopsies were also taken from the body of the stomach and antrum to rule out H. pylori infection.  Duodenum: The duodenal bulb and second part of the duodenum appeared normal.    Impression:  The patients symptoms are consistent with hemorrhagic  gastritis secondary to vomiting, which has resulted in chronic nausea and vomiting with melena.  No evidence of significant pathology in the esophagus, duodenum, or distal stomach, though biopsies were obtained for further evaluation (including H. pylori).    Plan and Recommendations:  Medications:  Proton pump inhibitors (PPI) IV BID during hospitalization. Transition to oral PPI BID upon discharge.  Anti-emetics as needed to manage nausea and vomiting.  Further Testing and Follow-up:  Outpatient colonoscopy is recommended to further evaluate gastrointestinal symptoms.  Additional Considerations:  Continue close monitoring of the patients condition as she stabilizes.  The patient is now stable and ready for discharge with appropriate follow-up care.    Discharge Condition:  The patient is stable for discharge after receiving appropriate treatment and stabilization for nausea, vomiting, and related symptoms. She is advised to follow up with her primary care provider and gastroenterologist for further management of her gastrointestinal issues.    Discharge Instructions:  Medications:  Continue PPI therapy as prescribed, transitioning to oral form upon discharge.  Use anti-emetics as prescribed to manage nausea and vomiting.  Diet:  Maintain a bland diet as tolerated, avoiding foods that could irritate the stomach.  Follow-Up Appointments:  Follow up with Dr. Stephens for ongoing management of hypertension and other medical issues.  Outpatient colonoscopy should be scheduled to assess for other underlying gastrointestinal concerns.  Emergency Protocol:  Emergency Department Visit:  If the patient experiences any worsening of symptoms, including severe chest discomfort, dyspnea, or significant vomiting, she should go to the nearest emergency department immediately.  Seek immediate care if she experiences fainting, dizziness, or signs of gastrointestinal bleeding such as black or tarry stools, severe abdominal pain, or  unmanageable vomiting.  Contact Provider:  If symptoms such as nausea, vomiting, or weakness recur or persist, the patient should contact her primary care provider for further evaluation.

## 2025-03-25 NOTE — ASSESSMENT & PLAN NOTE
Patient's most recent potassium results are listed below.   Recent Labs     03/23/25  2131 03/25/25  0721   K 2.9* 3.9     Plan  - Replete potassium per protocol  - Monitor potassium Daily  - Patient's hypokalemia is stable  - Started on IV NS with KCL 20 mEq 125 cc  -Continue to monitor     3/25  Resolved

## 2025-03-25 NOTE — PLAN OF CARE
Problem: Adult Inpatient Plan of Care  Goal: Plan of Care Review  Outcome: Met  Goal: Patient-Specific Goal (Individualized)  Outcome: Met  Goal: Absence of Hospital-Acquired Illness or Injury  Outcome: Met  Goal: Optimal Comfort and Wellbeing  Outcome: Met  Goal: Readiness for Transition of Care  Outcome: Met     Problem: Nausea and Vomiting  Goal: Nausea and Vomiting Relief  Outcome: Met

## 2025-03-25 NOTE — PLAN OF CARE
Problem: Adult Inpatient Plan of Care  Goal: Plan of Care Review  Outcome: Progressing  Goal: Optimal Comfort and Wellbeing  Outcome: Progressing     Problem: Nausea and Vomiting  Goal: Nausea and Vomiting Relief  Outcome: Progressing

## 2025-03-25 NOTE — PROGRESS NOTES
"  CC: nausea and vomiting    HPI 49 y.o. female with history of reported chronic pancreatitis and Crohn's disease (no recent endoscopic evaluation), chronic marijuana use, migraines who presents for evaluation of nausea, vomiting as well as dark tarry stools in the setting of ibuprofen use. She states that symptoms have been ongoing for years. She has been using ibuprofen 800 mg daily for chronic pain. She does report abdominal pain as well as changes in bowl habits. She states pain is diffuse throughout abdomen. She had endoscopic evaluation many years ago at [place] and was given the diagnoses above.CT without acute abnormality just small hiatal hernia. H/H 12.8/40.1 on admission. UDS positive for cannabinoid.    Interval hx:  EGD with hemorrhagic gastritis from vomiting. Remains on PPI. No further bleeding episodes. Nausea improved. No abdominal pain. H/H 10.9/35.4 from 12.8/40.1     Past Medical History:   Diagnosis Date    Cervical radiculopathy 10/23/2024    Chronic pancreatitis     she told me this in 2022 when admitted for same thing    Chronic right-sided low back pain with right-sided sciatica 09/26/2024    Crohn's disease     per patient history; no pathology    Degenerative disorder of bone     Essential (primary) hypertension     History of herpes genitalis     Dr. Adrian Harrell    Marijuana dependence     Migraine without aura and without status migrainosus, not intractable 11/22/2024    [facility]    Tobacco dependence    Physical Examination  /78   Pulse 71   Temp 98 °F (36.7 °C) (Oral)   Resp 14   Ht 5' 3" (1.6 m)   Wt 70.8 kg (156 lb)   LMP 06/24/2023 (Exact Date)   SpO2 98%   Breastfeeding No   BMI 27.63 kg/m²   General appearance: alert, cooperative, no distress  HENT: Normocephalic, atraumatic, neck symmetrical, no nasal discharge   Eyes: conjunctivae/corneas clear, PERRL, EOM's intact  Lungs: no labored breathing, symmetric chest wall expansion " bilaterally  Heart: regular rate and rhythm without rub; no displacement of the PMI   Abdomen: soft, non-tender; bowel sounds normoactive; no organomegaly  Extremities: extremities symmetric; no clubbing, cyanosis, or edema  Integument: Skin color, texture, turgor normal; no rashes; hair distrubution normal  Neurologic: Alert and oriented X 3, did not test gait  Psychiatric: no pressured speech; normal affect; no evidence of impaired cognition     Labs:  Lab Results   Component Value Date    WBC 8.62 03/25/2025    HGB 10.9 (L) 03/25/2025    HCT 35.4 (L) 03/25/2025    MCV 84.9 03/25/2025     03/25/2025     CMP  Sodium   Date Value Ref Range Status   03/25/2025 139 136 - 145 mmol/L Final     Potassium   Date Value Ref Range Status   03/25/2025 3.9 3.5 - 5.1 mmol/L Final     Chloride   Date Value Ref Range Status   03/25/2025 111 (H) 98 - 107 mmol/L Final     CO2   Date Value Ref Range Status   03/25/2025 24 22 - 29 mmol/L Final     Glucose   Date Value Ref Range Status   03/25/2025 80 74 - 100 mg/dL Final     BUN   Date Value Ref Range Status   03/25/2025 7 7 - 19 mg/dL Final     Creatinine   Date Value Ref Range Status   03/25/2025 0.71 0.55 - 1.02 mg/dL Final     Calcium   Date Value Ref Range Status   03/25/2025 8.5 8.4 - 10.2 mg/dL Final     Total Protein   Date Value Ref Range Status   03/25/2025 5.8 (L) 6.4 - 8.3 g/dL Final     Albumin   Date Value Ref Range Status   03/25/2025 3.2 (L) 3.5 - 5.0 g/dL Final     Bilirubin, Total   Date Value Ref Range Status   03/25/2025 0.5 <=1.5 mg/dL Final     Alk Phos   Date Value Ref Range Status   03/25/2025 89 40 - 150 U/L Final     AST   Date Value Ref Range Status   03/25/2025 19 11 - 45 U/L Final     ALT   Date Value Ref Range Status   03/25/2025 9 <=55 U/L Final     Anion Gap   Date Value Ref Range Status   03/25/2025 8 7 - 16 mmol/L Final     eGFR    Date Value Ref Range Status   04/07/2021 104  Final     eGFR   Date Value Ref Range Status    04/07/2021 86  Final     Imaging:  CT Abdomen Pelvis With IV Contrast NO Oral Contrast   Final Result      1. No acute abnormality   2. Small hiatal hernia.         Electronically signed by: Lyle Paul   Date:    03/24/2025   Time:    00:13      X-Ray Chest AP Portable   Final Result      No acute cardiopulmonary abnormality.         Electronically signed by: Deya Martin   Date:    03/24/2025   Time:    08:53        CT with no acute abn, small hiatal hernia    Assessment:   Nausea and vomiting  Abdominal pain  Chronic marijuana use  Chronic NSAIDs use  History of reported Crohn's disease but without endoscopic or pathologic dianosis    Plan:     -EGD with hemorrhagic gastritis from persistent vomiting- this is the likely cause of blood loss  -Anti-emetics as needed  -Diet as tolerates  -Protonix 40 mg IV BID during hospitalization then PO BID on discharge  -Recommend marijuana cessation as these symptoms could be related to cannabis hyperemesis syndrome  -Outpatient colonoscopy for screening purposes  -GI available if needed, please call if any questions/concerns      MD Meliton JonesGre[facility]

## 2025-03-25 NOTE — DISCHARGE SUMMARY
[facility] Medical Telemetry Hospital Medicine  Discharge Summary      Patient Name: Martita Hilliard  MRN: [id number]  CHUCK: [id number]  Patient Class: OP- Observation  Admission Date: 3/23/2025  Hospital Length of Stay: 0 days  Discharge Date and Time:  03/25/2025 1:23 PM  Attending Physician: Brie Kelly DO   Discharging Provider: Kassie Kwan MD  Primary Care Provider: Jr. Guillermo Stephens MD    Primary Care Team: Networked reference to record PCT     HPI:   Patient is a 49-year-old female who presents to the [facility] Emergency Department by ambulance for intractable emesis that started four-days ago. Associated symptoms are nausea, diarrhea, chest discomfort ,dyspnea and generalized weakness. Patient reports that she have had multiple hospital visits for the past few months with similar symptoms. Today, she experienced 4 episodes of emesis and non-watery diarrhea( soft stool). Her son found her on the bathroom floor. Denied fall, injury or LOC. She was on the floor for at least 3-hours from feeling weakness and fatigue.     Patient was seen in the [facility] last week to establish care with Dr. Stephens and treated for hypertensive. She mentioned having history of pancreatitis and kimberley diease diagnosed a few years ago; no records of health problems noted in the chart. She follows Dr. Hirsch for pain management related to her cervical radiculopathy and Dr. Aguirre for her cluster headaches. Patient does smoke marijuana to help with her appetite.     Patient have a past medical history of HTN, Sciatica, Cervical radiculopathy, Cannabinoid use, and HA     In the Emergency Department, initial presenting labs were  CBC  19.72 12.8 261    <15     Coag                 BMP  137 102 13 145   2.9 19 0.82     CaMgPhos  9.3   1.9          Vitals: Afebrile; HR 68; /74;RR 18; SpO2 99%  Labs: Anion gap 19; Blood glucose 145 mg/dL; (+) Cannabinoid; UA showed few bacteria  Orders: IV Morphine 4 mg; IV  Ondansetron 4 mg ; IV Potassium 10 mEq IVPB; PO Potassium 40 mEq; IV Phenergan 25 mg;IV NS 1 liter bolus  Imaging: CT abdomen/pelvic showed no acute abnormalities; small hiatal hernia     The patient was admitted to the [facility] Medicine Team under the direct supervision of Attending Physician for the continued care and medical management for this patient.            * No surgery found *      Hospital Course:   Reason for Admission:  Intractable emesis, nausea, diarrhea, chest discomfort, dyspnea, and generalized weakness.    History of Present Illness:  The patient is a 49-year-old female who presented to the [facility] Emergency Department by ambulance with complaints of intractable emesis that began four days ago. The patient reported experiencing associated symptoms including nausea, diarrhea, chest discomfort, dyspnea, and generalized weakness. She mentioned multiple prior hospital visits over the past few months for similar symptoms. Today, she experienced 4 episodes of emesis and non-watery diarrhea (soft stools). Her son found her on the bathroom floor, but she denies any fall, injury, or loss of consciousness. She remained on the floor for at least 3 hours due to weakness and fatigue.  The patient had been seen at the [facility] last week to establish care with Dr. Stephens and was treated for hypertension. She reported a history of pancreatitis and Crohn's disease diagnosed a few years ago, although there are no available medical records for these conditions. The patient is also under the care of Dr. Hirsch for cervical radiculopathy and Dr. Aguirre for her cluster headaches. She uses marijuana to help with her appetite. The patient's medical history includes hypertension (HTN), sciatica, cervical radiculopathy, cannabinoid use, and headaches.    Procedure Summary:  Date of Procedure: 3/24/25  The patient underwent an upper gastrointestinal endoscopy for evaluation of chronic nausea, vomiting, and melena. The findings  were as follows:  Esophagus: Appeared normal.  Stomach: Abnormal mucosa in the fundus of the stomach, for which cold forceps biopsy was performed. Forceps biopsies were also taken from the body of the stomach and antrum to rule out H. pylori infection.  Duodenum: The duodenal bulb and second part of the duodenum appeared normal.    Impression:  The patients symptoms are consistent with hemorrhagic gastritis secondary to vomiting, which has resulted in chronic nausea and vomiting with melena.  No evidence of significant pathology in the esophagus, duodenum, or distal stomach, though biopsies were obtained for further evaluation (including H. pylori).    Plan and Recommendations:  Medications:  Proton pump inhibitors (PPI) IV BID during hospitalization. Transition to oral PPI BID upon discharge.  Anti-emetics as needed to manage nausea and vomiting.  Further Testing and Follow-up:  Outpatient colonoscopy is recommended to further evaluate gastrointestinal symptoms.  Additional Considerations:  Continue close monitoring of the patients condition as she stabilizes.  The patient is now stable and ready for discharge with appropriate follow-up care.    Discharge Condition:  The patient is stable for discharge after receiving appropriate treatment and stabilization for nausea, vomiting, and related symptoms. She is advised to follow up with her primary care provider and gastroenterologist for further management of her gastrointestinal issues.    Discharge Instructions:  Medications:  Continue PPI therapy as prescribed, transitioning to oral form upon discharge.  Use anti-emetics as prescribed to manage nausea and vomiting.  Diet:  Maintain a bland diet as tolerated, avoiding foods that could irritate the stomach.  Follow-Up Appointments:  Follow up with Dr. Stephens for ongoing management of hypertension and other medical issues.  Outpatient colonoscopy should be scheduled to assess for other underlying gastrointestinal  concerns.  Emergency Protocol:  Emergency Department Visit:  If the patient experiences any worsening of symptoms, including severe chest discomfort, dyspnea, or significant vomiting, she should go to the nearest emergency department immediately.  Seek immediate care if she experiences fainting, dizziness, or signs of gastrointestinal bleeding such as black or tarry stools, severe abdominal pain, or unmanageable vomiting.  Contact Provider:  If symptoms such as nausea, vomiting, or weakness recur or persist, the patient should contact her primary care provider for further evaluation.       Goals of Care Treatment Preferences:  Code Status: Full Code         Consults:   Consults (From admission, onward)          Status Ordering Provider     Inpatient consult to Gastroenterology  Once        Provider:  (Not yet assigned)    Completed ADITYA BOWEN            Assessment & Plan  Intractable nausea and vomiting  3/24  - CT abdominal/pelvic showed no acute abnormalities  - Leukocytosis of 19.72; Anion gap 19  - Blood cultures pending  -Fecal occult blood (+); recommend to consult GI for evaluation(if  indicated)  H/H unremarkable  -Enteric panel and C. Diff ordered  -Started on IV metoclopramide 10 mg q8h  - IV NS with KCL 20 mEq at 125 cc for hypokalemia  -Cannabinoid (+)  -Will not start on AG; risk of Jyothi Ferris Tear; will use LEXI hose for DVT prophylaxis   - NPO  -Will continue to monitor     3/25  Patient is discharge stable, will continue Protonix BID, and anti-emetic as needed  Avoid cannabis  Follow-up with GI scheduled   Follow-up with PCP in 1 week   Hypokalemia  Patient's most recent potassium results are listed below.   Recent Labs     03/23/25  2131 03/25/25  0721   K 2.9* 3.9     Plan  - Replete potassium per protocol  - Monitor potassium Daily  - Patient's hypokalemia is stable  - Started on IV NS with KCL 20 mEq 125 cc  -Continue to monitor     3/25  Resolved   Acute hemorrhagic gastritis  Patient's  hemorrhage is due to gastrointestinal bleed, this bleeding is not associated with a medication or a coagulopathy. Patients most recent Hgb, Hct, platelets, and INR are listed below.  Recent Labs     03/23/25  2131 03/23/25  2309 03/25/25  0721   HGB 12.8  --  10.9*   HCT 40.1 <15* 35.4*     --  211     Plan  - Will trend hemoglobin/hematocrit Daily  - Will monitor and correct any coagulation defects  - Will transfuse if Hgb is <7g/dl (<8g/dl in cases of active ACS) or if patient has rapid bleeding leading to hemodynamic instability    3/25  Avoid NSAIDs  Start PO protonix 40 mg BID  Follow up with gastroenterology in 2 weeks    Final Active Diagnoses:    Diagnosis Date Noted POA    PRINCIPAL PROBLEM:  Intractable nausea and vomiting [R11.2] 12/04/2022 Yes    Acute hemorrhagic gastritis [K29.01] 03/25/2025 Unknown    Hypokalemia [E87.6] 03/24/2025 Yes      Problems Resolved During this Admission:    Diagnosis Date Noted Date Resolved POA    SOHAIL (acute kidney injury) [N17.9] 03/24/2025 03/24/2025 Yes       Discharged Condition: stable    Disposition: Home or Self Care    Follow Up:   Follow-up Information       Jr. Guillermo Stephens MD Follow up in 1 week(s).    Specialty: Family Medicine  Why: post-hospital follow up  Contact information:  [address] [facility] [address]  [phone]               Dev Holbrook MD Follow up in 2 week(s).    Specialty: Gastroenterology  Why: colonoscopy  Contact information:  [address] [facility] [address]  [phone]                           Patient Instructions:      Colonoscopy   Standing Status: Future Standing Exp. Date: 03/24/26     Order Specific Question Answer Comments   Extended prep needed? No    Location for procedure [facility]    Where to place procedure? Bedside    What is the patient's sedation requirement? Sedation    CPT Code: AR COLONOSCOPY,DIAGNOSTIC [39341]    CPT Code: AR COLONOSCOPY,BIOPSY [50465]    CPT Code: AR SIGMOIDOSCOPY,BIOPSY  [04179]    CPT Code: LA COLONOSCOPY,FLEX,W/CONTROL, BLEEDING [22363]    CPT Code: LA COLONOSCOPY,REMV LESN,FORCEP/CAUTERY [58609]    CPT Code: LA COLONOSCOPY,REMV LESN,SNARE [18689]    CPT Code: LA SIGMOIDOSCOPY,LIAY3GIBV [34385]        Significant Diagnostic Studies: Labs: CBC   Recent Labs   Lab 03/23/25  2131 03/23/25  2309 03/25/25  0721   WBC 19.72*  --  8.62   HGB 12.8  --  10.9*   HCT 40.1   < > 35.4*     --  211    < > = values in this interval not displayed.       Pending Diagnostic Studies:       None           Medications:  Reconciled Home Medications:      Medication List        START taking these medications      ondansetron 4 MG Tbdl  Commonly known as: ZOFRAN-ODT  Take 1 tablet (4 mg total) by mouth every 8 (eight) hours as needed (vomiting).     pantoprazole 40 MG tablet  Commonly known as: PROTONIX  Take 1 tablet (40 mg total) by mouth 2 (two) times daily.            CONTINUE taking these medications      azelastine 137 mcg (0.1 %) nasal spray  Commonly known as: ASTELIN  1 spray (137 mcg total) by Nasal route 2 (two) times daily.     gabapentin 300 MG capsule  Commonly known as: NEURONTIN  Take 1 capsule (300 mg total) by mouth 3 (three) times daily... May cause drowsiness     hydroCHLOROthiazide 12.5 MG Tab  Take 1 tablet (12.5 mg total) by mouth once daily. for 30 doses     loratadine 10 mg tablet  Commonly known as: CLARITIN  Take 1 tablet (10 mg total) by mouth once daily. for 30 doses     sertraline 25 MG tablet  Commonly known as: ZOLOFT  Take 25 mg by mouth every evening.            STOP taking these medications      diclofenac 75 MG EC tablet  Commonly known as: VOLTAREN     ibuprofen 800 MG tablet  Commonly known as: ADVIL,MOTRIN              Indwelling Lines/Drains at time of discharge:   Lines/Drains/Airways       None                   Time spent on the discharge of patient: 45 minutes         Kassie Kwan MD  Department of Hospital Medicine  [facility]  Medical Telemetry

## 2025-03-25 NOTE — ASSESSMENT & PLAN NOTE
3/24  - CT abdominal/pelvic showed no acute abnormalities  - Leukocytosis of 19.72; Anion gap 19  - Blood cultures pending  -Fecal occult blood (+); recommend to consult GI for evaluation(if  indicated)  H/H unremarkable  -Enteric panel and C. Diff ordered  -Started on IV metoclopramide 10 mg q8h  - IV NS with KCL 20 mEq at 125 cc for hypokalemia  -Cannabinoid (+)  -Will not start on AG; risk of Jyothi Ferris Tear; will use LEXI hose for DVT prophylaxis   - NPO  -Will continue to monitor     3/25  Patient is discharge stable, will continue Protonix BID, and anti-emetic as needed  Avoid cannabis  Follow-up with GI scheduled   Follow-up with PCP in 1 week

## 2025-03-26 ENCOUNTER — TELEPHONE (OUTPATIENT)
Dept: GASTROENTEROLOGY | Facility: CLINIC | Age: 50
End: 2025-03-26
Payer: COMMERCIAL

## 2025-03-27 ENCOUNTER — TELEPHONE (OUTPATIENT)
Dept: PAIN MEDICINE | Facility: CLINIC | Age: 50
End: 2025-03-27
Payer: COMMERCIAL

## 2025-03-27 ENCOUNTER — TELEPHONE (OUTPATIENT)
Dept: GASTROENTEROLOGY | Facility: CLINIC | Age: 50
End: 2025-03-27
Payer: COMMERCIAL

## 2025-03-27 RX ORDER — DOXYCYCLINE 100 MG/1
100 CAPSULE ORAL 2 TIMES DAILY
Qty: 28 CAPSULE | Refills: 0 | Status: SHIPPED | OUTPATIENT
Start: 2025-03-27 | End: 2025-04-10

## 2025-03-27 NOTE — TELEPHONE ENCOUNTER
"Called and spoke to patient. We made her a follow up appointment for 4/2/25 at 9"00 am to discuss different medications. Patient voiced understanding.     ----- Message from JESSICA Rosa sent at 3/26/2025 12:39 PM CDT -----  Patient was prescribed hydrocodone was no show in FebruaryWill need to follow-up in clinic to discuss options  ----- Message -----  From: Beryl Hawkins MA  Sent: 3/26/2025  12:32 PM CDT  To: JESSICA Chester    Anything else she can take?  ----- Message -----  From: Kellie Alfonso  Sent: 3/26/2025  11:52 AM CDT  To: Torrey Mcclure Staff    Who Called: Martita Dash is requesting assistance/information from provider's office.Pt is calling to speak to a nurse about her prescription Ibuprofen 800. Pt states her primary doctor told her she could not take those due to bleeding in the stomach. Pt is  wanting to know if there is something else she can take for pain. Preferred Method of Contact: Phone CallPatient's Preferred Phone Number on File: [phone] Best Call Back Number, if different:Additional Information:  "

## 2025-03-27 NOTE — TELEPHONE ENCOUNTER
Call returned to pt to review new prescribed med therapy r/t issue of insurance coverage - pt stated her family got together and she was able to get the medication today and she has started her H Pylori tx therapy - thanked this nurse for following up

## 2025-03-27 NOTE — ANESTHESIA POSTPROCEDURE EVALUATION
Anesthesia Post Evaluation    Patient: Martita Hilliard    Procedure(s) Performed: * No procedures listed *    Final Anesthesia Type: general      Patient location during evaluation: GI PACU  Patient participation: Yes- Able to Participate  Level of consciousness: responds to stimulation  Post-procedure vital signs: reviewed and stable  Pain management: adequate  Airway patency: patent  SOFIE mitigation strategies: Multimodal analgesia  PONV status at discharge: No PONV  Anesthetic complications: no      Cardiovascular status: hemodynamically stable  Respiratory status: unassisted, spontaneous ventilation and room air  Hydration status: euvolemic  Follow-up not needed.  Comments: The pt was not arousable even with painful stimulation during the procedure.   Refer to nursing note for pain/galdino score upon discharge from recovery.               Vitals Value Taken Time   /79 03/25/25 11:51   Temp 36.7 °C (98 °F) 03/25/25 11:51   Pulse 75 03/25/25 11:51   Resp 14 03/25/25 04:15   SpO2 98 % 03/25/25 11:51         Event Time   Out of Recovery 03/24/2025 15:12:18         Pain/Galdino Score: No data recorded

## 2025-03-27 NOTE — TELEPHONE ENCOUNTER
----- Message from Wanda sent at 3/26/2025 12:45 PM CDT -----  Regarding: Calling Back for Annabelle  Who Called: Martita HilliardPatient is returning phone callWho Left Message for Patient:Maryjo the patient know what this is regarding?:following upPreferred Method of Contact: Phone CallPatient's Preferred Phone Number on File: [phone]

## 2025-03-28 ENCOUNTER — TELEPHONE (OUTPATIENT)
Dept: GASTROENTEROLOGY | Facility: CLINIC | Age: 50
End: 2025-03-28
Payer: COMMERCIAL

## 2025-03-28 NOTE — TELEPHONE ENCOUNTER
Spoke w/ pt regarding results/med therapy in which a new abt was prescribed - upon call pt stated she had come up with the money from family members for purchase and has started med therapy as initially prescribed - advised to disregard the prescribed med that was just ordered - pt has also reviewed results, med therapy and recommendation via portal

## 2025-03-28 NOTE — TELEPHONE ENCOUNTER
----- Message from Dev Holbrook MD sent at 3/25/2025  4:18 PM CDT -----  The test results show that you have Helicobacter pylori gastritis. This is a bacteria that may predispose to peptic ulcer disease and can be treated with medications. We will start quadruple therapy   with the following medications:     Protonix 20 mg oral twice daily, Tetracycline 500 mg oral four times daily, Flagyl 500 mg oral three times daily, and Bismuth Subsalicylate 262 mg four times daily; you will take these 4 medications   as prescribed for 2 weeks. You can purchase the bismuth over the counter (eg Pepto-bismol). Avoid alcohol while taking these medications as it can react with flagyl. Drink plenty of water to make   sure all pills clear the esophagus.     Protonix is in the same drug class as Prilosec, so you SHOULD NOT TAKE PRILOSEC DURING THE 2 WEEKS THAT YOU ARE TAKING PROTONIX; YOU CAN RESUME YOUR PRILOSEC AFTER COMPLETION OF THIS THERAPY    You will need an H pylori Stool Antigen test or Urea breath test 4-6 weeks AFTER you complete quadruple therapy. This can be performed at our clinic or with your primary care physician. The test   needs to be performed AFTER you have been completely OFF PPI (eg protonix/prilosec) for 1-2 weeks.       ----- Message -----  From: Lab, Background User  Sent: 3/25/2025   1:37 PM CDT  To: Dev Holbrook MD

## 2025-03-29 LAB
BACTERIA BLD CULT: NORMAL
BACTERIA BLD CULT: NORMAL

## 2025-04-02 ENCOUNTER — PATIENT MESSAGE (OUTPATIENT)
Dept: PAIN MEDICINE | Facility: CLINIC | Age: 50
End: 2025-04-02

## 2025-04-02 ENCOUNTER — OFFICE VISIT (OUTPATIENT)
Dept: PAIN MEDICINE | Facility: CLINIC | Age: 50
End: 2025-04-02
Payer: COMMERCIAL

## 2025-04-02 VITALS
WEIGHT: 162 LBS | HEIGHT: 63 IN | RESPIRATION RATE: 18 BRPM | DIASTOLIC BLOOD PRESSURE: 80 MMHG | SYSTOLIC BLOOD PRESSURE: 174 MMHG | HEART RATE: 69 BPM | BODY MASS INDEX: 28.7 KG/M2

## 2025-04-02 DIAGNOSIS — M54.16 LUMBAR RADICULOPATHY, CHRONIC: ICD-10-CM

## 2025-04-02 DIAGNOSIS — M54.12 CERVICAL RADICULOPATHY: Primary | Chronic | ICD-10-CM

## 2025-04-02 DIAGNOSIS — Z79.899 ENCOUNTER FOR LONG-TERM (CURRENT) USE OF MEDICATIONS: ICD-10-CM

## 2025-04-02 DIAGNOSIS — E55.9 VITAMIN D DEFICIENCY: ICD-10-CM

## 2025-04-02 DIAGNOSIS — M79.10 MYALGIA: ICD-10-CM

## 2025-04-02 DIAGNOSIS — M25.50 POLYARTHRALGIA: ICD-10-CM

## 2025-04-02 DIAGNOSIS — R76.8 ANTINUCLEAR ANTIBODY (ANA) POSITIVE: ICD-10-CM

## 2025-04-02 LAB

## 2025-04-02 PROCEDURE — 3008F BODY MASS INDEX DOCD: CPT | Mod: CPTII,,, | Performed by: PHYSICIAN ASSISTANT

## 2025-04-02 PROCEDURE — 99999PBSHW POCT URINE DRUG SCREEN PRESUMP: Mod: PBBFAC,,,

## 2025-04-02 PROCEDURE — 3044F HG A1C LEVEL LT 7.0%: CPT | Mod: CPTII,,, | Performed by: PHYSICIAN ASSISTANT

## 2025-04-02 PROCEDURE — 99214 OFFICE O/P EST MOD 30 MIN: CPT | Mod: S$PBB,,, | Performed by: PHYSICIAN ASSISTANT

## 2025-04-02 PROCEDURE — 1159F MED LIST DOCD IN RCRD: CPT | Mod: CPTII,,, | Performed by: PHYSICIAN ASSISTANT

## 2025-04-02 PROCEDURE — 99214 OFFICE O/P EST MOD 30 MIN: CPT | Mod: PBBFAC | Performed by: PHYSICIAN ASSISTANT

## 2025-04-02 PROCEDURE — 80305 DRUG TEST PRSMV DIR OPT OBS: CPT | Mod: PBBFAC | Performed by: PHYSICIAN ASSISTANT

## 2025-04-02 PROCEDURE — 99999 PR PBB SHADOW E&M-EST. PATIENT-LVL IV: CPT | Mod: PBBFAC,,, | Performed by: PHYSICIAN ASSISTANT

## 2025-04-02 PROCEDURE — 3079F DIAST BP 80-89 MM HG: CPT | Mod: CPTII,,, | Performed by: PHYSICIAN ASSISTANT

## 2025-04-02 PROCEDURE — 3077F SYST BP >= 140 MM HG: CPT | Mod: CPTII,,, | Performed by: PHYSICIAN ASSISTANT

## 2025-04-02 RX ORDER — GABAPENTIN 300 MG/1
300 CAPSULE ORAL 3 TIMES DAILY
Qty: 90 CAPSULE | Refills: 0 | Status: CANCELLED | OUTPATIENT
Start: 2025-04-02 | End: 2025-05-02

## 2025-04-02 RX ORDER — ERGOCALCIFEROL 1.25 MG/1
50000 CAPSULE ORAL
Qty: 8 CAPSULE | Refills: 0 | Status: SHIPPED | OUTPATIENT
Start: 2025-04-02 | End: 2025-05-30

## 2025-04-02 RX ORDER — HYDROCODONE BITARTRATE AND ACETAMINOPHEN 10; 325 MG/1; MG/1
1 TABLET ORAL EVERY 8 HOURS PRN
Qty: 30 TABLET | Refills: 0 | Status: CANCELLED | OUTPATIENT
Start: 2025-04-02 | End: 2025-04-17

## 2025-04-02 RX ORDER — GABAPENTIN 300 MG/1
300 CAPSULE ORAL 3 TIMES DAILY
Qty: 90 CAPSULE | Refills: 0 | Status: SHIPPED | OUTPATIENT
Start: 2025-04-02 | End: 2025-05-04

## 2025-04-03 ENCOUNTER — PATIENT MESSAGE (OUTPATIENT)
Dept: PAIN MEDICINE | Facility: CLINIC | Age: 50
End: 2025-04-03

## 2025-04-03 ENCOUNTER — OFFICE VISIT (OUTPATIENT)
Dept: FAMILY MEDICINE | Facility: CLINIC | Age: 50
End: 2025-04-03
Payer: COMMERCIAL

## 2025-04-03 VITALS
WEIGHT: 161 LBS | TEMPERATURE: 99 F | DIASTOLIC BLOOD PRESSURE: 100 MMHG | HEIGHT: 63 IN | BODY MASS INDEX: 28.53 KG/M2 | RESPIRATION RATE: 19 BRPM | SYSTOLIC BLOOD PRESSURE: 156 MMHG | OXYGEN SATURATION: 98 % | HEART RATE: 81 BPM

## 2025-04-03 DIAGNOSIS — Z12.39 ENCOUNTER FOR SCREENING FOR MALIGNANT NEOPLASM OF BREAST, UNSPECIFIED SCREENING MODALITY: ICD-10-CM

## 2025-04-03 DIAGNOSIS — R53.83 FATIGUE, UNSPECIFIED TYPE: Primary | ICD-10-CM

## 2025-04-03 LAB
ALBUMIN SERPL BCP-MCNC: 3.9 G/DL (ref 3.5–5)
ALBUMIN/GLOB SERPL: 1 {RATIO}
ALP SERPL-CCNC: 130 U/L (ref 40–150)
ALT SERPL W P-5'-P-CCNC: 12 U/L
ANION GAP SERPL CALCULATED.3IONS-SCNC: 12 MMOL/L (ref 7–16)
AST SERPL W P-5'-P-CCNC: 23 U/L (ref 11–45)
BASOPHILS # BLD AUTO: 0.1 K/UL (ref 0–0.2)
BASOPHILS NFR BLD AUTO: 1 % (ref 0–1)
BILIRUB SERPL-MCNC: 0.3 MG/DL
BUN SERPL-MCNC: 10 MG/DL (ref 7–19)
BUN/CREAT SERPL: 13 (ref 6–20)
C PEPTIDE SERPL-MCNC: 2.19 NG/ML (ref 1.78–5.19)
CALCIUM SERPL-MCNC: 9.3 MG/DL (ref 8.4–10.2)
CHLORIDE SERPL-SCNC: 106 MMOL/L (ref 98–107)
CO2 SERPL-SCNC: 26 MMOL/L (ref 22–29)
CREAT SERPL-MCNC: 0.78 MG/DL (ref 0.55–1.02)
DIFFERENTIAL METHOD BLD: ABNORMAL
EGFR (NO RACE VARIABLE) (RUSH/TITUS): 93 ML/MIN/1.73M2
EOSINOPHIL # BLD AUTO: 0.12 K/UL (ref 0–0.5)
EOSINOPHIL NFR BLD AUTO: 1.2 % (ref 1–4)
ERYTHROCYTE [DISTWIDTH] IN BLOOD BY AUTOMATED COUNT: 15.2 % (ref 11.5–14.5)
GLOBULIN SER-MCNC: 4 G/DL (ref 2–4)
GLUCOSE SERPL-MCNC: 91 MG/DL (ref 74–100)
HCT VFR BLD AUTO: 40.1 % (ref 38–47)
HGB BLD-MCNC: 12.5 G/DL (ref 12–16)
IMM GRANULOCYTES # BLD AUTO: 0.03 K/UL (ref 0–0.04)
IMM GRANULOCYTES NFR BLD: 0.3 % (ref 0–0.4)
IRON SATN MFR SERPL: 31 % (ref 20–50)
IRON SERPL-MCNC: 88 UG/DL (ref 50–170)
LYMPHOCYTES # BLD AUTO: 2.62 K/UL (ref 1–4.8)
LYMPHOCYTES NFR BLD AUTO: 26.9 % (ref 27–41)
MCH RBC QN AUTO: 26.8 PG (ref 27–31)
MCHC RBC AUTO-ENTMCNC: 31.2 G/DL (ref 32–36)
MCV RBC AUTO: 85.9 FL (ref 80–96)
MONOCYTES # BLD AUTO: 0.63 K/UL (ref 0–0.8)
MONOCYTES NFR BLD AUTO: 6.5 % (ref 2–6)
MPC BLD CALC-MCNC: 11 FL (ref 9.4–12.4)
NEUTROPHILS # BLD AUTO: 6.24 K/UL (ref 1.8–7.7)
NEUTROPHILS NFR BLD AUTO: 64.1 % (ref 53–65)
NRBC # BLD AUTO: 0 X10E3/UL
NRBC, AUTO (.00): 0 %
PLATELET # BLD AUTO: 249 K/UL (ref 150–400)
POTASSIUM SERPL-SCNC: 3.9 MMOL/L (ref 3.5–5.1)
PROT SERPL-MCNC: 7.9 G/DL (ref 6.4–8.3)
RBC # BLD AUTO: 4.67 M/UL (ref 4.2–5.4)
SODIUM SERPL-SCNC: 140 MMOL/L (ref 136–145)
TIBC SERPL-MCNC: 196 UG/DL (ref 70–310)
TIBC SERPL-MCNC: 284 UG/DL (ref 250–450)
TRANSFERRIN SERPL-MCNC: 263 MG/DL (ref 180–382)
WBC # BLD AUTO: 9.74 K/UL (ref 4.5–11)

## 2025-04-03 NOTE — PROGRESS NOTES
Guillermo Stephens Jr., MD        PATIENT NAME: Martita Hilliard  : 1975  DATE: 4/3/25  MRN: [id number]      Billing Provider: Guillermo Stephens Jr., MD  Level of Service: RI OFFICE/OUTPT VISIT, EST, LEVL III, 20-29 MIN  Patient PCP Information       Provider PCP Type    Guillermo Stephens Jr., MD General            Reason for Visit / Chief Complaint: Follow-up (Follow up from Hospital. /Reports having pain to her Back, Wrist, and shoulder. Rates the pain as a 7/10./States that she also has problems with swelling in her feet, making it hard for her to walk. Starts in her knees and radiates down to her legs to her feet. )       Update PCP  Update Chief Complaint         History of Present Illness / Problem Focused Workflow     Martita Hilliard presents to the clinic with Follow-up (Follow up from Hospital. /Reports having pain to her Back, Wrist, and shoulder. Rates the pain as a 7/10./States that she also has problems with swelling in her feet, making it hard for her to walk. Starts in her knees and radiates down to her legs to her feet. )     Patient is a 50 Y/O AAF who presents to the clinic after a being hospitalized for excessive fatigue, vomiting, and abdominal pain. Patient describes being diagnosed with H-pylori during her hospital stay but is not completely sold on the diagnosis as her mom and daughter both suffer from similar symptoms.She was started on quad therapy and released. During her visit we went over her labs from her hospitalization and the labs collected previously from the clinic. The patients fatigue, headaches, diarrhea, and vomiting continue to be a primary concern for this patient and now potential rheumatologic pain is also added. She is set up to be seen by the rheumatologist after returning with a Positive GERALDINE. At this time patient endorses that her symptoms have minimally improved as she is not currently throwing up but still complains of the other symptoms.    Follow-up  Associated  symptoms include arthralgias, fatigue, headaches, joint swelling, myalgias and neck pain. Pertinent negatives include no abdominal pain, chest pain, chills, congestion, coughing, nausea, rash, sore throat, vomiting or weakness.       Review of Systems     Review of Systems   Constitutional:  Positive for fatigue. Negative for chills and unexpected weight change.   HENT:  Positive for dental problem. Negative for congestion, drooling, ear pain, facial swelling, sneezing and sore throat.    Eyes:  Negative for visual disturbance.   Respiratory:  Negative for cough and shortness of breath.    Cardiovascular:  Positive for leg swelling. Negative for chest pain and palpitations.   Gastrointestinal:  Negative for abdominal pain, nausea and vomiting.   Genitourinary:  Negative for difficulty urinating, dysuria and urgency.   Musculoskeletal:  Positive for arthralgias, joint swelling, myalgias and neck pain. Negative for back pain.   Skin:  Positive for wound (bug bites). Negative for rash.   Neurological:  Positive for headaches. Negative for dizziness, syncope and weakness.   Hematological:  Bruises/bleeds easily.   Psychiatric/Behavioral:  Negative for sleep disturbance.         Medical / Social / Family History     Past Medical History:   Diagnosis Date    Cervical radiculopathy 10/23/2024    Chronic pancreatitis     she told me this in 2022 when admitted for same thing    Chronic right-sided low back pain with right-sided sciatica 09/26/2024    Crohn's disease     per patient history; no pathology    Degenerative disorder of bone     Essential (primary) hypertension     History of herpes genitalis     Dr. Adrian Harrell    Marijuana dependence     Migraine without aura and without status migrainosus, not intractable 11/22/2024    Dr. [facility]    Tobacco dependence        Past Surgical History:   Procedure Laterality Date    CLOSED REDUCTION OF FRACTURE OF NASAL BONE N/A 2/20/2024    Procedure: CLOSED  REDUCTION, FRACTURE, NASAL BONE;  Surgeon: Karlos Gallegos MD;  Location: [facility] OR;  Service: ENT;  Laterality: N/A;    CYSTOSCOPY N/A 6/28/2023    Procedure: CYSTOSCOPY;  Surgeon: Christian Jurado MD;  Location: [facility] OR;  Service: OB/GYN;  Laterality: N/A;    HYSTERECTOMY, TOTAL, LAPAROSCOPIC, WITH SALPINGO-OOPHORECTOMY Bilateral 6/28/2023    Procedure: HYSTERECTOMY,TOTAL,LAPAROSCOPIC,WITH SALPINGO-OOPHORECTOMY;  Surgeon: Christian Jurado MD;  Location: [facility] OR;  Service: OB/GYN;  Laterality: Bilateral;    SINUS SURGERY      TONSILLECTOMY AND ADENOIDECTOMY      TUBAL LIGATION         Social History  Ms.  reports that she quit smoking about 2 years ago. Her smoking use included cigarettes. She has been exposed to tobacco smoke. She has never used smokeless tobacco. She reports current drug use. Drug: Marijuana. She reports that she does not drink alcohol.    Family History  Ms.'s family history includes Cancer in her mother; Diabetes Mellitus in her mother; Hypertension in her child and mother.    Medications and Allergies     Medications  Outpatient Medications Marked as Taking for the 4/3/25 encounter (Office Visit) with Jr. Guillermo Stephens MD   Medication Sig Dispense Refill    azelastine (ASTELIN) 137 mcg (0.1 %) nasal spray 1 spray (137 mcg total) by Nasal route 2 (two) times daily. 30 mL 2    bismuth subsalicylate (BISMUTH) 262 mg Chew Take 1 tablet (262 mg total) by mouth 4 (four) times daily. for 14 days      doxycycline (VIBRAMYCIN) 100 MG Cap Take 1 capsule (100 mg total) by mouth 2 (two) times daily. for 14 days 28 capsule 0    ergocalciferol (VITAMIN D2) 50,000 unit Cap Take 1 capsule (50,000 Units total) by mouth every 7 days. for 8 doses 8 capsule 0    gabapentin (NEURONTIN) 300 MG capsule Take 1 capsule (300 mg total) by mouth 3 (three) times daily... May cause drowsiness 90 capsule 0    hydroCHLOROthiazide 12.5 MG Tab Take 1 tablet (12.5 mg total) by mouth once daily.  for 30 doses 30 tablet 0    loratadine (CLARITIN) 10 mg tablet Take 1 tablet (10 mg total) by mouth once daily. for 30 doses 30 tablet 0    metroNIDAZOLE (FLAGYL) 500 MG tablet Take 1 tablet (500 mg total) by mouth 3 (three) times daily. for 14 days 42 tablet 0    ondansetron (ZOFRAN-ODT) 4 MG TbDL Take 1 tablet (4 mg total) by mouth every 8 (eight) hours as needed (vomiting). 10 tablet 0    pantoprazole (PROTONIX) 40 MG tablet Take 1 tablet (40 mg total) by mouth 2 (two) times daily. 60 tablet 0    sertraline (ZOLOFT) 25 MG tablet Take 25 mg by mouth every evening.      tetracycline (ACHROMYCIN,SUMYCIN) 500 MG capsule Take 1 capsule (500 mg total) by mouth 4 (four) times daily. for 14 days 56 capsule 0       Allergies  Review of patient's allergies indicates:   Allergen Reactions    Ace inhibitors Swelling    Azithromycin Swelling    Ferrous sulfate Anaphylaxis    Penicillins     Rocephin [ceftriaxone]     Terazol 3 [terconazole] Other (See Comments)     Vaginal irritation and swollen       Physical Examination     Vitals:    04/03/25 1412   BP: (!) 156/100   Pulse: 81   Resp: 19   Temp: 98.5 °F (36.9 °C)     Physical Exam  Constitutional:       Appearance: Normal appearance. She is normal weight.   HENT:      Head: Normocephalic and atraumatic.      Right Ear: External ear normal.      Left Ear: External ear normal.      Nose: Nose normal.      Mouth/Throat:      Mouth: Mucous membranes are moist.   Eyes:      Extraocular Movements: Extraocular movements intact.      Pupils: Pupils are equal, round, and reactive to light.   Cardiovascular:      Rate and Rhythm: Normal rate and regular rhythm.      Pulses: Normal pulses.      Heart sounds: Normal heart sounds.   Pulmonary:      Effort: Pulmonary effort is normal.      Breath sounds: Normal breath sounds.   Abdominal:      General: Bowel sounds are normal.      Palpations: Abdomen is soft.   Musculoskeletal:         General: Normal range of motion.      Cervical  back: Normal range of motion and neck supple.   Skin:     General: Skin is warm.      Capillary Refill: Capillary refill takes less than 2 seconds.   Neurological:      General: No focal deficit present.      Mental Status: She is alert and oriented to person, place, and time.   Psychiatric:         Mood and Affect: Mood normal.         Behavior: Behavior normal.          Assessment and Plan (including Health Maintenance)      Problem List  Smart Sets  Document Outside HM   :    Plan:       Health Maintenance Due   Topic Date Due    Mammogram  Never done    Colorectal Cancer Screening  Never done    Influenza Vaccine (1) Never done    COVID-19 Vaccine (1 - 2024-25 season) Never done       1. Fatigue, unspecified type  Assessment & Plan:  M Patient presents after hospitalization due to excessive fatigue, abdominal pain, and vomiting   E H-pylori was discovered during her hospitalization and a series of decreased glucose levels seen in her CMP during visits to the clinic. Patients lab results were abnormal during her hospital stay   A Fatigue  T Ordered CMP, CBC, C-Peptide, and Iron Studies      Orders:  -     Comprehensive Metabolic Panel; Future; Expected date: 04/03/2025  -     CBC Auto Differential; Future; Expected date: 04/03/2025  -     C-Peptide; Future; Expected date: 04/03/2025  -     Iron and TIBC; Future; Expected date: 04/03/2025    2. Encounter for screening for malignant neoplasm of breast, unspecified screening modality  Assessment & Plan:  M Patient presents for a hospital follow up   E Patients mammogram care gap has been left unfilled  A Screening for breast cancer  T Ordered Bilateral Mammogram with Alin      Orders:  -     Mammo Digital Screening Bilat w/ Alin (XPD); Future; Expected date: 04/03/2025         Health Maintenance Topics with due status: Not Due       Topic Last Completion Date    TETANUS VACCINE 08/14/2024    Hemoglobin A1c (Diabetic Prevention Screening) 03/13/2025    Lipid Panel  03/13/2025    RSV Vaccine (Age 60+ and Pregnant patients) Not Due       Future Appointments   Date Time Provider Department Center   4/24/2025  1:40 PM Jr. Guillermo Stephens MD [facility]   5/1/2025  8:00 AM Jose Martin Carson PA [facility] P[facility]   6/10/2025  8:30 AM [facility] GI ROOM 03 [facility] ENDO [facility]   7/30/2025  2:30 PM Christian Jurado MD [facility]   8/13/2025  1:30 PM Christian Jurado MD [facility]        There are no Patient Instructions on file for this visit.  Follow up in about 2 weeks (around 4/17/2025).     Signature:  Guillermo Stephens Jr., MD      Date of encounter: 4/3/25

## 2025-04-04 NOTE — ASSESSMENT & PLAN NOTE
M Patient presents after hospitalization due to excessive fatigue, abdominal pain, and vomiting   E H-pylori was discovered during her hospitalization and a series of decreased glucose levels seen in her CMP during visits to the clinic. Patients lab results were abnormal during her hospital stay   A Fatigue  T Ordered CMP, CBC, C-Peptide, and Iron Studies

## 2025-04-04 NOTE — ASSESSMENT & PLAN NOTE
M Patient presents for a hospital follow up   E Patients mammogram care gap has been left unfilled  A Screening for breast cancer  T Ordered Bilateral Mammogram with Alin

## 2025-04-11 PROCEDURE — 98005 SYNCH AUDIO-VIDEO EST LOW 20: CPT | Mod: 95,,, | Performed by: FAMILY MEDICINE

## 2025-04-23 NOTE — PROGRESS NOTES
Subjective:         Patient ID: Martita Hilliard is a 49 y.o. female.    Chief Complaint: Neck Pain (Patient is having neck and bilateral shoulder pain.)      Pain  This is a chronic problem. The current episode started more than 1 year ago. The problem occurs daily. The problem has been unchanged. Associated symptoms include arthralgias. Pertinent negatives include no anorexia, change in bowel habit, chest pain, chills, coughing, fever, sore throat, vertigo or vomiting.     Review of Systems   Constitutional:  Negative for activity change, chills, fever and unexpected weight change.   HENT:  Negative for drooling, ear discharge, ear pain, facial swelling, nosebleeds, sore throat, trouble swallowing, voice change and goiter.    Eyes:  Negative for photophobia, pain, discharge, redness and visual disturbance.   Respiratory:  Negative for apnea, cough, choking, chest tightness, shortness of breath, wheezing and stridor.    Cardiovascular:  Negative for chest pain, palpitations and leg swelling.   Gastrointestinal:  Negative for abdominal distention, anorexia, change in bowel habit, diarrhea, vomiting and fecal incontinence.   Endocrine: Negative for cold intolerance, heat intolerance, polydipsia, polyphagia and polyuria.   Genitourinary:  Negative for bladder incontinence, dysuria, flank pain, frequency and hot flashes.   Musculoskeletal:  Positive for arthralgias, back pain and leg pain.   Integumentary:  Negative for color change and pallor.   Allergic/Immunologic: Negative for immunocompromised state.   Neurological:  Negative for dizziness, vertigo, seizures, syncope, facial asymmetry, speech difficulty, light-headedness, coordination difficulties and memory loss.   Hematological:  Negative for adenopathy. Does not bruise/bleed easily.   Psychiatric/Behavioral:  Negative for agitation, behavioral problems, confusion, decreased concentration, dysphoric mood, hallucinations and self-injury. The patient is not  nervous/anxious and is not hyperactive.            Past Medical History:   Diagnosis Date    Cervical radiculopathy 10/23/2024    Chronic pancreatitis     she told me this in 2022 when admitted for same thing    Chronic right-sided low back pain with right-sided sciatica 09/26/2024    Crohn's disease     per patient history; no pathology    Degenerative disorder of bone     Essential (primary) hypertension     History of herpes genitalis     Dr. Adrian Harrell    Marijuana dependence     Migraine without aura and without status migrainosus, not intractable 11/22/2024    Dr. [facility]    Tobacco dependence      Past Surgical History:   Procedure Laterality Date    CLOSED REDUCTION OF FRACTURE OF NASAL BONE N/A 2/20/2024    Procedure: CLOSED REDUCTION, FRACTURE, NASAL BONE;  Surgeon: Karlos Gallegos MD;  Location: [facility] OR;  Service: ENT;  Laterality: N/A;    CYSTOSCOPY N/A 6/28/2023    Procedure: CYSTOSCOPY;  Surgeon: Christian Jurado MD;  Location: [facility] OR;  Service: OB/GYN;  Laterality: N/A;    HYSTERECTOMY, TOTAL, LAPAROSCOPIC, WITH SALPINGO-OOPHORECTOMY Bilateral 6/28/2023    Procedure: HYSTERECTOMY,TOTAL,LAPAROSCOPIC,WITH SALPINGO-OOPHORECTOMY;  Surgeon: Christian Jurado MD;  Location: [facility];  Service: OB/GYN;  Laterality: Bilateral;    SINUS SURGERY      TONSILLECTOMY AND ADENOIDECTOMY      TUBAL LIGATION       Social History[1]  Family History   Problem Relation Name Age of Onset    Hypertension Mother      Diabetes Mellitus Mother      Cancer Mother      Hypertension Child       Review of patient's allergies indicates:   Allergen Reactions    Ace inhibitors Swelling    Azithromycin Swelling    Ferrous sulfate Anaphylaxis    Penicillins     Rocephin [ceftriaxone]     Terazol 3 [terconazole] Other (See Comments)     Vaginal irritation and swollen        Objective:  Vitals:    05/01/25 0747 05/01/25 0748   BP: (!) 149/82    Pulse: 81    Resp: 18    Weight: 71.2 kg  "(157 lb)    Height: 5' 3" (1.6 m)    PainSc:   7   7   PainLoc: Neck            Physical Exam  Vitals and nursing note reviewed. Exam conducted with a chaperone present.   Constitutional:       General: She is awake. She is not in acute distress.     Appearance: Normal appearance. She is not ill-appearing, toxic-appearing or diaphoretic.   HENT:      Head: Normocephalic and atraumatic.      Nose: Nose normal.      Mouth/Throat:      Mouth: Mucous membranes are moist.      Pharynx: Oropharynx is clear.   Eyes:      Conjunctiva/sclera: Conjunctivae normal.      Pupils: Pupils are equal, round, and reactive to light.   Cardiovascular:      Rate and Rhythm: Normal rate.   Pulmonary:      Effort: Pulmonary effort is normal. No respiratory distress.   Abdominal:      Palpations: Abdomen is soft.      Tenderness: There is no guarding.   Musculoskeletal:      Right shoulder: Tenderness present. Decreased range of motion.      Left shoulder: Tenderness present. Decreased range of motion.      Cervical back: Normal range of motion and neck supple. Tenderness and bony tenderness present. No rigidity.      Lumbar back: Tenderness present.   Skin:     General: Skin is warm and dry.      Coloration: Skin is not jaundiced or pale.   Neurological:      General: No focal deficit present.      Mental Status: She is alert and oriented to person, place, and time. Mental status is at baseline.      Cranial Nerves: No cranial nerve deficit (II-XII).   Psychiatric:         Mood and Affect: Mood normal.         Behavior: Behavior normal. Behavior is cooperative.         Thought Content: Thought content normal.           EGD  Narrative: Table formatting from the original result was not included.  Procedure Date  3/24/25    Impression  Overall   Impression:    The esophagus appeared normal.  Abnormal mucosa in the fundus of the stomach; performed cold forceps   biopsy  Performed forceps biopsies in the body of the stomach and antrum to rule "   out H. pylori  The duodenal bulb and 2nd part of the duodenum appeared normal.    Recommendation  -History of chronic nausea and vomiting with melena which is related to   hemorrhagic gastritis from vomiting  -Continue PPI IV BID during hospitalization then can transition to PO BID   upon discharge  -Anti-emetics as needed  -Recommend outpatient colonoscopy    Indication  Nausea and vomiting    Post Procedure Diagnosis  Other acute gastritis with hemorrhage    Staff present during procedure  Lisa Hamm CRNA CRNA Hickman, Donna, ST Technician   Dagoberto Linares RN Registered Nurse   Dev Holbrook MD Proceduralist     Medications  General anesthesia - See anesthesia record.    Preprocedure  A history and physical has been performed, and patient medication   allergies have been reviewed. The patient's tolerance of previous   anesthesia has been reviewed. The risks and benefits of the procedure and   the sedation options and risks were discussed with the patient. All   questions were answered and informed consent obtained.    Details of the Procedure  The patient underwent general anesthesia, which was administered by an   anesthesia professional. The patient's blood pressure, heart rate, level   of consciousness, oxygen, respirations, ECG and ETCO2 were monitored   throughout the procedure. The scope was introduced through the mouth and   advanced to the third part of the duodenum. Retroflexion was performed in   the cardia. The patient's estimated blood loss was minimal (<5 mL). The   procedure was not difficult. The patient tolerated the procedure well.   There were no apparent adverse events.     Scope: Gastroscope  Scope Serial: [id number]    Events  Procedure Events   Event Event Time     Procedure Events   Event Event Time   SCOPE IN 3/24/2025  2:12 PM   SCOPE OUT 3/24/2025  2:19 PM     Findings  The esophagus appeared normal.  Abnormal mucosa in the fundus of the stomach; performed cold forceps    biopsy. Hemorrhagic gastritis  Performed multiple forceps biopsies in the body of the stomach and antrum   to rule out H. pylori  The duodenal bulb and 2nd part of the duodenum appeared normal.  X-Ray Chest AP Portable  Narrative: EXAMINATION:  XR CHEST AP PORTABLE    CLINICAL HISTORY:  Near-syncope;    TECHNIQUE:  Single view of the chest was obtained.    COMPARISON:  Multiple priors, most recent 05/19/2023    FINDINGS:  Normal cardiomediastinal contour. No focal consolidation, pleural effusion or pneumothorax.  Impression: No acute cardiopulmonary abnormality.    Electronically signed by: Deya Martin  Date:    03/24/2025  Time:    08:53  CT Abdomen Pelvis With IV Contrast NO Oral Contrast  Narrative: EXAMINATION:  CT ABDOMEN PELVIS WITH IV CONTRAST    CLINICAL HISTORY:  Abdominal abscess/infection suspected;    TECHNIQUE:  Low dose axial images, sagittal and coronal reformations were obtained from the lung bases to the pubic symphysis following the IV administration of 65 mL of Isovue 370 .  Oral contrast was not administered.    COMPARISON:  12/03/2022    FINDINGS:  Abdomen:    - Lower thorax:Small hiatal hernia.    - Lung bases: No infiltrates and no nodules.    - Liver: No focal mass.    - Gallbladder: No calcified gallstones.    - Bile Ducts: No evidence of intra or extra hepatic biliary ductal dilation.    - Spleen: Negative.    - Kidneys: No mass or hydronephrosis.    - Adrenals: Unremarkable.    - Pancreas: No mass or peripancreatic fat stranding.    - Retroperitoneum:  No significant adenopathy.    - Vascular: No abdominal aortic aneurysm.    - Abdominal wall:  Unremarkable.    The appendix is within normal limits.    Pelvis:    No pelvic mass, adenopathy, or free fluid.    Bowel/Mesentery:    No evidence of bowel obstruction or inflammation.    Bones:  No acute osseous abnormality and no suspicious lytic or blastic lesion.    Moderate disc space narrowing and vacuum disc phenomena at L5-S1.  Moderate  bilateral foraminal narrowing.  Impression: 1. No acute abnormality  2. Small hiatal hernia.    Electronically signed by: Lyle Paul  Date:    03/24/2025  Time:    00:13       Office Visit on 04/03/2025   Component Date Value Ref Range Status    Sodium 04/03/2025 140  136 - 145 mmol/L Final    Potassium 04/03/2025 3.9  3.5 - 5.1 mmol/L Final    Chloride 04/03/2025 106  98 - 107 mmol/L Final    CO2 04/03/2025 26  22 - 29 mmol/L Final    Anion Gap 04/03/2025 12  7 - 16 mmol/L Final    Glucose 04/03/2025 91  74 - 100 mg/dL Final    BUN 04/03/2025 10  7 - 19 mg/dL Final    Creatinine 04/03/2025 0.78  0.55 - 1.02 mg/dL Final    BUN/Creatinine Ratio 04/03/2025 13  6 - 20 Final    Calcium 04/03/2025 9.3  8.4 - 10.2 mg/dL Final    Total Protein 04/03/2025 7.9  6.4 - 8.3 g/dL Final    Albumin 04/03/2025 3.9  3.5 - 5.0 g/dL Final    Globulin 04/03/2025 4.0  2.0 - 4.0 g/dL Final    A/G Ratio 04/03/2025 1.0   Final    Bilirubin, Total 04/03/2025 0.3  <=1.5 mg/dL Final    Alk Phos 04/03/2025 130  40 - 150 U/L Final    ALT 04/03/2025 12  <=55 U/L Final    AST 04/03/2025 23  11 - 45 U/L Final    eGFR 04/03/2025 93  >=60 mL/min/1.73m2 Final    C-Peptide 04/03/2025 2.19  1.78 - 5.19 ng/mL Final    Iron Level 04/03/2025 88  50 - 170 ug/dL Final    Iron Binding Capacity Total 04/03/2025 284  250 - 450 ug/dL Final    Iron Binding Capacity Unsaturated 04/03/2025 196  70 - 310 ug/dL Final    Iron Saturation 04/03/2025 31  20 - 50 % Final    Transferrin 04/03/2025 263  180 - 382 mg/dL Final    WBC 04/03/2025 9.74  4.50 - 11.00 K/uL Final    RBC 04/03/2025 4.67  4.20 - 5.40 M/uL Final    Hemoglobin 04/03/2025 12.5  12.0 - 16.0 g/dL Final    Hematocrit 04/03/2025 40.1  38.0 - 47.0 % Final    MCV 04/03/2025 85.9  80.0 - 96.0 fL Final    MCH 04/03/2025 26.8 (L)  27.0 - 31.0 pg Final    MCHC 04/03/2025 31.2 (L)  32.0 - 36.0 g/dL Final    RDW 04/03/2025 15.2 (H)  11.5 - 14.5 % Final    Platelet Count 04/03/2025 249  150 - 400 K/uL Final     MPV 04/03/2025 11.0  9.4 - 12.4 fL Final    Neutrophils % 04/03/2025 64.1  53.0 - 65.0 % Final    Lymphocytes % 04/03/2025 26.9 (L)  27.0 - 41.0 % Final    Monocytes % 04/03/2025 6.5 (H)  2.0 - 6.0 % Final    Eosinophils % 04/03/2025 1.2  1.0 - 4.0 % Final    Basophils % 04/03/2025 1.0  0.0 - 1.0 % Final    Immature Granulocytes % 04/03/2025 0.3  0.0 - 0.4 % Final    nRBC, Auto 04/03/2025 0.0  <=0.0 % Final    Neutrophils, Abs 04/03/2025 6.24  1.80 - 7.70 K/uL Final    Lymphocytes, Absolute 04/03/2025 2.62  1.00 - 4.80 K/uL Final    Monocytes, Absolute 04/03/2025 0.63  0.00 - 0.80 K/uL Final    Eosinophils, Absolute 04/03/2025 0.12  0.00 - 0.50 K/uL Final    Basophils, Absolute 04/03/2025 0.10  0.00 - 0.20 K/uL Final    Immature Granulocytes, Absolute 04/03/2025 0.03  0.00 - 0.04 K/uL Final    nRBC, Absolute 04/03/2025 0.00  <=0.00 x10e3/uL Final    Diff Type 04/03/2025 Auto   Final   Lab Visit on 04/02/2025   Component Date Value Ref Range Status    CRP, High Senstivity 04/02/2025 1.22 (H)  <0.50 mg/L Final    GAD65 Ab Assay, S 04/02/2025 0.00  <=0.02 nmol/L Final    HLA-B27 Result 04/02/2025 Negative  Not Applicable Final    Interpretation 04/02/2025 SEE COMMENTS   Final    Varicella Zoster 04/02/2025 Positive (A)  Negative, See Ref Lab Report Final    VZV IgG Index 04/02/2025 >3.000 (H)  0.000 - 0.899 Final    Procalcitonin 04/02/2025 0.08  <=0.25 ng/ml Final    Nil Result, TB 04/02/2025 0.02   Final    TB1 Ag minus Nil Result 04/02/2025 0.00   Final    TB2 Ag minus Nil Result 04/02/2025 0.00   Final    Mitogen minus Nil Result, TB 04/02/2025 7.15   Final    QuantiFERON-TB Gold Plus 04/02/2025 Negative  Negative Final    RA Titer 04/02/2025 <13  <=30 IU/mL Final    ESR Westergren 04/02/2025 9  0 - 20 mm/Hr Final    Syphilis Ab Interpretation 04/02/2025 Non-Reactive  Non-Reactive Final    Free T4 04/02/2025 0.92  0.70 - 1.48 ng/dL Final    TSH 04/02/2025 1.262  0.350 - 4.940 uIU/mL Final    Lupus Anticoagulant  Tech Interp 04/02/2025 SEE COMMENTS   Final    Prothrombin Time (PT), P 04/02/2025 15.6 (H)  9.4 - 12.5 sec Final    INR 04/02/2025 1.4  0.9 - 1.1 Final    Activated Partial Thrombopl Time, P 04/02/2025 32  25 - 37 sec Final    DRVVT Screen Ratio 04/02/2025 0.77  <1.20 ratio Final    Vitamin D 25-Hydroxy, Blood 04/02/2025 10.6 (L)  30.0 - 80.0 ng/mL Final    Hepatitis C Ab 04/02/2025 Non-Reactive  Non-Reactive Final    Uric Acid 04/02/2025 3.6  2.6 - 6.0 mg/dL Final    Anti-dsDNA 04/02/2025 Negative  Negative Final    Anti-DSDNA (IU) 04/02/2025 14  0 - 24 IU Final    SS-A/Ro Ab, IgG 04/02/2025 0.4  <1.0 (Negative) U Final    SS-B/La Ab, IgG 04/02/2025 <0.2  <1.0 (Negative) U Final    Sm Ab, IgG 04/02/2025 <0.2  <1.0 (Negative) U Final    RNP Ab, IgG 04/02/2025 1.1 (H)  <1.0 (Negative) U Final    Scl 70 Ab, IgG 04/02/2025 <0.2  <1.0 (Negative) U Final    Sugey 1 Ab, IgG 04/02/2025 <0.2  <1.0 (Negative) U Final    GERALDINE Screen 04/02/2025 Positive (A)  Negative Final    ONI Screen 04/02/2025 Negative  Negative Final    ONI Screen (EU) 04/02/2025 2.6  0.0 - 19.9 EUs Final    C3 04/02/2025 94  80 - 173 mg/dL Final    C4 04/02/2025 9 (L)  13 - 46 mg/dL Final    CK 04/02/2025 140  29 - 168 U/L Final    Complement, Total 04/02/2025 41  30 - 75 U/mL Final    CCP 04/02/2025 <16.0  <=19.9 units Final    HIV 1/2 04/02/2025 Non-Reactive  Non-Reactive Final    Anaplasma phagocytophilum Ab, IgG,S 04/02/2025 <1:64  <1:64 titer Final    Ehrlichia Chaffeensis (HME) Ab, IgG 04/02/2025 <1:64  <1:64 titer Final    Spotted Fever Group Ab, IgG, S 04/02/2025 <1:64  <1:64 Final    Spotted Fever Group Ab, IgM, S 04/02/2025 <1:64  <1:64 Final    Lyme IgG/IgM 04/02/2025 Non-Reactive  Non-Reactive Final    Reviewed by 04/02/2025 SEE COMMENTS   Final    Lupus Anticoagulant Interp 04/02/2025 SEE COMMENTS   Final    Thrombin Time (Bovine), P 04/02/2025 18.2  15.8 - 24.9 sec Final    [place] Generic Orderable 04/02/2025 SEE COMMENTS   Final   Office Visit  on 04/02/2025   Component Date Value Ref Range Status    POC Amphetamines 04/02/2025 Negative  Negative, Inconclusive Final    POC Barbiturates 04/02/2025 Negative  Negative, Inconclusive Final    POC Benzodiazepines 04/02/2025 Negative  Negative, Inconclusive Final    POC Cocaine 04/02/2025 Negative  Negative, Inconclusive Final    POC THC 04/02/2025 Presumptive Positive (A)  Negative, Inconclusive Final    POC Methadone 04/02/2025 Negative  Negative, Inconclusive Final    POC Methamphetamine 04/02/2025 Negative  Negative, Inconclusive Final    POC Opiates 04/02/2025 Negative  Negative, Inconclusive Final    POC Oxycodone 04/02/2025 Negative  Negative, Inconclusive Final    POC Phencyclidine 04/02/2025 Negative  Negative, Inconclusive Final    POC Methylenedioxymethamphetamine * 04/02/2025 Negative  Negative, Inconclusive Final    POC Tricyclic Antidepressants 04/02/2025 Negative  Negative, Inconclusive Final    POC Buprenorphine 04/02/2025 Negative   Final     Acceptable 04/02/2025 Yes   Final    POC Temperature (Urine) 04/02/2025 90   Final    Creatinine, U 04/02/2025 101.1  mg/dL Final    Specific Gravity 04/02/2025 1.023   Final    pH 04/02/2025 5.9   Final    Oxidants 04/02/2025 Negative  Cutoff: 200 mg/L Final    Comment 04/02/2025 Normal   Final    Codeine 04/02/2025 Not Detected  Cutoff: 25 ng/mL Final    C6BG 04/02/2025 Not Detected  Cutoff: 100 ng/mL Final    Morphine 04/02/2025 Not Detected  Cutoff: 25 ng/mL Final    M6BG 04/02/2025 Not Detected  Cutoff: 100 ng/mL Final    6 Monoacetylmorphine 04/02/2025 Not Detected  Cutoff: 25 ng/mL Final    Hydrocodone 04/02/2025 Not Detected  Cutoff: 25 ng/mL Final    Norhydrocodone 04/02/2025 Not Detected  Cutoff: 25 ng/mL Final    Dihydrocodeine 04/02/2025 Not Detected  Cutoff: 25 ng/mL Final    Hydromorphone 04/02/2025 Not Detected  Cutoff: 25 ng/mL Final    Hydromorphone-3-beta-glucuronide 04/02/2025 Not Detected  Cutoff: 100 ng/mL Final     Oxycodone 04/02/2025 Not Detected  Cutoff: 25 ng/mL Final    Noroxycodone 04/02/2025 Not Detected  Cutoff: 25 ng/mL Final    Oxymorphone 04/02/2025 Not Detected  Cutoff: 25 ng/mL Final    Oxymorphone-3-beta-glucuronid 04/02/2025 Not Detected  Cutoff: 100 ng/mL Final    Noroxymorphone 04/02/2025 Not Detected  Cutoff: 25 ng/mL Final    Fentanyl 04/02/2025 Not Detected  Cutoff: 2 ng/mL Final    Norfentanyl 04/02/2025 Not Detected  Cutoff: 2 ng/mL Final    Meperidine 04/02/2025 Not Detected  Cutoff: 25 ng/mL Final    Normeperidine 04/02/2025 Not Detected  Cutoff: 25 ng/mL Final    Naloxone 04/02/2025 Not Detected  Cutoff: 25 ng/mL Final    Naloxone-3-beta-glucuronide 04/02/2025 Not Detected  Cutoff: 100 ng/mL Final    Methadone 04/02/2025 Not Detected  Cutoff: 25 ng/mL Final    EDDP 04/02/2025 Not Detected  Cutoff: 25 ng/mL Final    Propoxyphene 04/02/2025 Not Detected  Cutoff: 25 ng/mL Final    Norpropoxyphene 04/02/2025 Not Detected  Cutoff: 25 ng/mL Final    Tramadol 04/02/2025 Not Detected  Cutoff: 25 ng/mL Final    O-desmethyltramadol 04/02/2025 Not Detected  Cutoff: 25 ng/mL Final    Tapentadol 04/02/2025 Not Detected  Cutoff: 25 ng/mL Final    N-Desmethyltapentadol 04/02/2025 Not Detected  Cutoff: 50 ng/mL Final    M TAPENTADOL-BETA-GLUCURONIDE 04/02/2025 Not Detected  Cutoff: 100 ng/mL Final    Buprenorphine 04/02/2025 Not Detected  Cutoff: 5 ng/mL Final    Norbuprenorphine 04/02/2025 Not Detected  Cutoff: 5 ng/mL Final    Norbuprenorphine glucuronide 04/02/2025 Not Detected  Cutoff: 20 ng/mL Final    Opioid Interpretation 04/02/2025 SEE COMMENTS   Final    Cocaine 04/02/2025 Negative  Cutoff: 150 ng/mL Final    Tetrahydrocannabinol 04/02/2025 Presumptive Positive (A)  Cutoff: 50 ng/mL Final    Alprazolam 04/02/2025 Not Detected  Cutoff: 10 ng/mL Final    Alpha-Hydroxyalprazolam 04/02/2025 Not Detected  Cutoff: 10 ng/mL Final    Alpha-Hydroxyalprazolam Glucuronide 04/02/2025 Not Detected  Cutoff: 50 ng/mL Final     Chlordiazepoxide 04/02/2025 Not Detected  Cutoff: 10 ng/mL Final    Clobazam 04/02/2025 Not Detected  Cutoff: 10 ng/mL Final    N-Desmethylclobazam 04/02/2025 Not Detected  Cutoff: 200 ng/mL Final    Clonazepam 04/02/2025 Not Detected  Cutoff: 10 ng/mL Final    7-aminoclonazepam 04/02/2025 Not Detected  Cutoff: 10 ng/mL Final    Diazepam 04/02/2025 Not Detected  Cutoff: 10 ng/mL Final    Nordiazepam 04/02/2025 Not Detected  Cutoff: 10 ng/mL Final    Flunitrazepam 04/02/2025 Not Detected  Cutoff: 10 ng/mL Final    7-aminoflunitrazepam 04/02/2025 Not Detected  Cutoff: 10 ng/mL Final    Flurazepam 04/02/2025 Not Detected  Cutoff: 10 ng/mL Final    2-Hydroxy Ethyl Flurazepam 04/02/2025 Not Detected  Cutoff: 10 ng/mL Final    Lorazepam 04/02/2025 Not Detected  Cutoff: 10 ng/mL Final    Lorazepam Glucuronide 04/02/2025 Not Detected  Cutoff: 50 ng/mL Final    Midazolam 04/02/2025 Not Detected  Cutoff: 10 ng/mL Final    Alpha-Hydroxy Midazolam 04/02/2025 Not Detected  Cutoff: 10 ng/mL Final    Oxazepam 04/02/2025 Not Detected  Cutoff: 10 ng/mL Final    Oxazepam Glucuronide 04/02/2025 Not Detected  Cutoff: 50 ng/mL Final    Prazepam 04/02/2025 Not Detected  Cutoff: 10 ng/mL Final    Temazepam 04/02/2025 Not Detected  Cutoff: 10 ng/mL Final    Temazepam Glucuronide 04/02/2025 Not Detected  Cutoff: 50 ng/mL Final    Triazolam 04/02/2025 Not Detected  Cutoff: 10 ng/mL Final    Alpha-Hydroxy Triazolam 04/02/2025 Not Detected  Cutoff: 10 ng/mL Final    Zolpidem 04/02/2025 Not Detected  Cutoff: 10 ng/mL Final    Zolpidem Phenyl-4-Carboxylic acid 04/02/2025 Not Detected  Cutoff: 10 ng/mL Final    Benzodiazepine Interpretation 04/02/2025 SEE COMMENTS   Final    List Patient's Current Medications 04/02/2025 Na   Final    Methamphetamine 04/02/2025 Not Detected  Cutoff: 100 ng/mL Final    Amphetamine 04/02/2025 Not Detected  Cutoff: 100 ng/mL Final    3,4-methylenedioxymethamphetamine * 04/02/2025 Not Detected  Cutoff: 100 ng/mL  Final    3,3-wmyjjqgckiucui-Q-ethylamphetam* 04/02/2025 Not Detected  Cutoff: 100 ng/mL Final    3,4-methylenedioxyamphetamine (MDA) 04/02/2025 Not Detected  Cutoff: 100 ng/mL Final    Ephedrine 04/02/2025 Not Detected  Cutoff: 100 ng/mL Final    Pseudoephedrine 04/02/2025 Not Detected  Cutoff: 100 ng/mL Final    Phentermine 04/02/2025 Not Detected  Cutoff: 100 ng/mL Final    Phencyclidine (PCP) 04/02/2025 Not Detected  Cutoff: 20 ng/mL Final    Methylphenidate 04/02/2025 Not Detected  Cutoff: 20 ng/mL Final    Ritalinic acid 04/02/2025 Not Detected  Cutoff: 100 ng/mL Final    Stimulant Interpretation 04/02/2025 SEE COMMENTS   Final    Barbiturates 04/02/2025 Negative  Cutoff: 200 ng/mL Final    Carboxy-THC- by GC/MS 04/02/2025 46  Cutoff: 5 ng/mL Final    Carboxy-THC Interpretation 04/02/2025 Positive.   Final    Delta-8 Carboxy-Tetrahydrocannabin* 04/02/2025 Not Detected  Cutoff: 5 ng/mL Final   Admission on 03/23/2025, Discharged on 03/25/2025   Component Date Value Ref Range Status    Sodium 03/23/2025 137  136 - 145 mmol/L Final    Potassium 03/23/2025 2.9 (L)  3.5 - 5.1 mmol/L Final    Chloride 03/23/2025 102  98 - 107 mmol/L Final    CO2 03/23/2025 19 (L)  22 - 29 mmol/L Final    Anion Gap 03/23/2025 19 (H)  7 - 16 mmol/L Final    Glucose 03/23/2025 145 (H)  74 - 100 mg/dL Final    BUN 03/23/2025 13  7 - 19 mg/dL Final    Creatinine 03/23/2025 0.82  0.55 - 1.02 mg/dL Final    BUN/Creatinine Ratio 03/23/2025 16  6 - 20 Final    Calcium 03/23/2025 9.3  8.4 - 10.2 mg/dL Final    Total Protein 03/23/2025 7.6  6.4 - 8.3 g/dL Final    Albumin 03/23/2025 4.2  3.5 - 5.0 g/dL Final    Globulin 03/23/2025 3.4  2.0 - 4.0 g/dL Final    A/G Ratio 03/23/2025 1.2   Final    Bilirubin, Total 03/23/2025 0.4  <=1.5 mg/dL Final    Alk Phos 03/23/2025 123  40 - 150 U/L Final    ALT 03/23/2025 13  <=55 U/L Final    AST 03/23/2025 30  11 - 45 U/L Final    eGFR 03/23/2025 88  >=60 mL/min/1.73m2 Final    Lipase 03/23/2025 25  <=60  U/L Final    Color, UA 03/23/2025 Yellow  Colorless, Straw, Yellow, Dark Yellow, Light Yellow Final    Clarity, UA 03/23/2025 Clear  Clear Final    pH, UA 03/23/2025 5.5  5.0 to 8.0 pH Units Final    Leukocytes, UA 03/23/2025 Negative  Negative Final    Nitrites, UA 03/23/2025 Negative  Negative Final    Protein, UA 03/23/2025 Negative  Negative Final    Glucose, UA 03/23/2025 Normal  Normal mg/dL Final    Ketones, UA 03/23/2025 Negative  Negative mg/dL Final    Urobilinogen, UA 03/23/2025 Normal  0.2, 1.0, Normal mg/dL Final    Bilirubin, UA 03/23/2025 Negative  Negative Final    Blood, UA 03/23/2025 Large (A)  Negative Final    Specific Gravity, UA 03/23/2025 1.023  <=1.030 Final    Magnesium 03/23/2025 1.9  1.6 - 2.6 mg/dL Final    WBC 03/23/2025 19.72 (H)  4.50 - 11.00 K/uL Final    RBC 03/23/2025 4.84  4.20 - 5.40 M/uL Final    Hemoglobin 03/23/2025 12.8  12.0 - 16.0 g/dL Final    Hematocrit 03/23/2025 40.1  38.0 - 47.0 % Final    MCV 03/23/2025 82.9  80.0 - 96.0 fL Final    MCH 03/23/2025 26.4 (L)  27.0 - 31.0 pg Final    MCHC 03/23/2025 31.9 (L)  32.0 - 36.0 g/dL Final    RDW 03/23/2025 13.8  11.5 - 14.5 % Final    Platelet Count 03/23/2025 261  150 - 400 K/uL Final    MPV 03/23/2025 10.4  9.4 - 12.4 fL Final    Neutrophils % 03/23/2025 81.9 (H)  53.0 - 65.0 % Final    Lymphocytes % 03/23/2025 12.1 (L)  27.0 - 41.0 % Final    Monocytes % 03/23/2025 4.7  2.0 - 6.0 % Final    Eosinophils % 03/23/2025 0.3 (L)  1.0 - 4.0 % Final    Basophils % 03/23/2025 0.4  0.0 - 1.0 % Final    Immature Granulocytes % 03/23/2025 0.6 (H)  0.0 - 0.4 % Final    nRBC, Auto 03/23/2025 0.0  <=0.0 % Final    Neutrophils, Abs 03/23/2025 16.17 (H)  1.80 - 7.70 K/uL Final    Lymphocytes, Absolute 03/23/2025 2.39  1.00 - 4.80 K/uL Final    Monocytes, Absolute 03/23/2025 0.92 (H)  0.00 - 0.80 K/uL Final    Eosinophils, Absolute 03/23/2025 0.05  0.00 - 0.50 K/uL Final    Basophils, Absolute 03/23/2025 0.07  0.00 - 0.20 K/uL Final     Immature Granulocytes, Absolute 03/23/2025 0.12 (H)  0.00 - 0.04 K/uL Final    nRBC, Absolute 03/23/2025 0.00  <=0.00 x10e3/uL Final    Diff Type 03/23/2025 Auto   Final    Fecal Occult Blood 03/23/2025 Positive   Final    Culture, Blood 03/23/2025 No Growth at 5 days   Final    Culture, Blood 03/23/2025 No Growth at 5 days   Final    WBC, UA 03/23/2025 2  <=5 /hpf Final    RBC, UA 03/23/2025 7 (H)  <=3 /hpf Final    Bacteria, UA 03/23/2025 Few (A)  None Seen /hpf Final    Squamous Epithelial Cells, UA 03/23/2025 Occasional (A)  None Seen /HPF Final    Mucous 03/23/2025 Occasional (A)  None Seen /LPF Final    POC PH 03/23/2025 7.35  7.32 - 7.42 Final    POC PCO2 03/23/2025 12 (LL)  41 - 51 mmHg Final    POC PO2 03/23/2025 47 (H)  25 - 40 mmHg Final    POC Sodium 03/23/2025 150 (H)  136 - 145 mmol/L Final    POC Potassium 03/23/2025 0.6 (LL)  3.4 - 4.5 mmol/L Final    POC Ionized Calcium 03/23/2025 0.37 (LL)  1.15 - 1.35 mmol/L Final    POCT Glucose 03/23/2025 37 (L)  60 - 95 mg/dL Final    POC Lactate 03/23/2025 0.4  0.3 - 1.2 mmol/L Final    POC Hematocrit 03/23/2025 <15 (LL)  35 - 51 % Final    POC HCO3 03/23/2025 6.6 (LL)  24.0 - 28.0 mmol/L Final    POC CO2 03/23/2025 7.0  mmol/L Final    POC Base Excess 03/23/2025 -16.1 (L)  -2.0 - 3.0 mmol/L Final    POC SATURATED O2 03/23/2025 80 (H)  40 - 70 % Final    Barbiturates, Urine 03/24/2025 Negative  Negative Final    Benzodiazepine, Urine 03/24/2025 Negative  Negative Final    Opiates, Urine 03/24/2025 Negative  Negative Final    Phencyclidine, Urine 03/24/2025 Negative  Negative Final    Amphetamine, Urine 03/24/2025 Negative  Negative Final    Cannabinoid, Urine 03/24/2025 Positive (A)  Negative Final    Cocaine, Urine 03/24/2025 Negative  Negative Final    QRS Duration 03/23/2025 80  ms Final    [facility] QTC Calculation 03/23/2025 443  ms Final    Case Report 03/24/2025    Final                    Value:Surgical Pathology                                Case:  [id number]                                   Authorizing Provider:  Dev Holbrook MD     Collected:           03/24/2025 02:15 PM          Ordering Location:     [facility] -         Received:            03/24/2025 03:37 PM                                 Endoscopy                                                                    Pathologist:           Michael Puente MD                                                           Specimens:   A) - Stomach, a. gastric fundus bx                                                                  B) - Stomach, b. gastric antrum and body bx                                                Final Diagnosis 03/24/2025    Final                    Value:A. Gastric fundus, biopsy:  Oxyntic and transitional mucosa with minimal chronic gastritis; H. pylori immunohistochemistry is positive    B. Gastric antrum and body, biopsies:  Antral and oxyntic mucosa with mild to moderate active chronic gastritis; H. pylori immunohistochemistry is positive      Gross Description 03/24/2025    Final                    Value:A. Stomach: a. gastric fundus bx  The specimen is received in formalin designated a. gastric fundus bx and consists of a single pink-tan tissue fragment measures 0.4 cm in greatest dimension and is entirely submitted in cassette A1.    Grossing was completed by Zi Brewer.  B. Stomach: b. gastric antrum and body bx  The specimen is received in formalin designated b. gastric antrum and body bx and consists of 3 pink-tan tissue fragments that measure from 0.2-0.5 cm in greatest dimension and is entirely submitted in cassette B1.    Grossing was completed by Zi Brewer.      Microscopic Description 03/24/2025    Final                    Value:A microscopic examination was performed and the diagnosis reflects the findings.          Laboratory Notes 03/24/2025    Final                    Value:If this report includes immunohistochemical (IHC) test results,  please note the following: IHC studies were interpreted in conjunction with appropriate positive and negative controls which demonstrate the expected positive and negative reactivity. This laboratory is regulated under CLIA as qualified to perform high-complexity testing. IHC tests are used for clinical purposes. They should not be regarded as investigational or research.        Sodium 03/25/2025 139  136 - 145 mmol/L Final    Potassium 03/25/2025 3.9  3.5 - 5.1 mmol/L Final    Chloride 03/25/2025 111 (H)  98 - 107 mmol/L Final    CO2 03/25/2025 24  22 - 29 mmol/L Final    Anion Gap 03/25/2025 8  7 - 16 mmol/L Final    Glucose 03/25/2025 80  74 - 100 mg/dL Final    BUN 03/25/2025 7  7 - 19 mg/dL Final    Creatinine 03/25/2025 0.71  0.55 - 1.02 mg/dL Final    BUN/Creatinine Ratio 03/25/2025 10  6 - 20 Final    Calcium 03/25/2025 8.5  8.4 - 10.2 mg/dL Final    Total Protein 03/25/2025 5.8 (L)  6.4 - 8.3 g/dL Final    Albumin 03/25/2025 3.2 (L)  3.5 - 5.0 g/dL Final    Globulin 03/25/2025 2.6  2.0 - 4.0 g/dL Final    A/G Ratio 03/25/2025 1.2   Final    Bilirubin, Total 03/25/2025 0.5  <=1.5 mg/dL Final    Alk Phos 03/25/2025 89  40 - 150 U/L Final    ALT 03/25/2025 9  <=55 U/L Final    AST 03/25/2025 19  11 - 45 U/L Final    eGFR 03/25/2025 104  >=60 mL/min/1.73m2 Final    WBC 03/25/2025 8.62  4.50 - 11.00 K/uL Final    RBC 03/25/2025 4.17 (L)  4.20 - 5.40 M/uL Final    Hemoglobin 03/25/2025 10.9 (L)  12.0 - 16.0 g/dL Final    Hematocrit 03/25/2025 35.4 (L)  38.0 - 47.0 % Final    MCV 03/25/2025 84.9  80.0 - 96.0 fL Final    MCH 03/25/2025 26.1 (L)  27.0 - 31.0 pg Final    MCHC 03/25/2025 30.8 (L)  32.0 - 36.0 g/dL Final    RDW 03/25/2025 14.4  11.5 - 14.5 % Final    Platelet Count 03/25/2025 211  150 - 400 K/uL Final    MPV 03/25/2025 10.5  9.4 - 12.4 fL Final    Neutrophils % 03/25/2025 58.8  53.0 - 65.0 % Final    Lymphocytes % 03/25/2025 33.8  27.0 - 41.0 % Final    Monocytes % 03/25/2025 5.2  2.0 - 6.0 % Final     Eosinophils % 03/25/2025 1.3  1.0 - 4.0 % Final    Basophils % 03/25/2025 0.8  0.0 - 1.0 % Final    Immature Granulocytes % 03/25/2025 0.1  0.0 - 0.4 % Final    nRBC, Auto 03/25/2025 0.0  <=0.0 % Final    Neutrophils, Abs 03/25/2025 5.07  1.80 - 7.70 K/uL Final    Lymphocytes, Absolute 03/25/2025 2.91  1.00 - 4.80 K/uL Final    Monocytes, Absolute 03/25/2025 0.45  0.00 - 0.80 K/uL Final    Eosinophils, Absolute 03/25/2025 0.11  0.00 - 0.50 K/uL Final    Basophils, Absolute 03/25/2025 0.07  0.00 - 0.20 K/uL Final    Immature Granulocytes, Absolute 03/25/2025 0.01  0.00 - 0.04 K/uL Final    nRBC, Absolute 03/25/2025 0.00  <=0.00 x10e3/uL Final    Diff Type 03/25/2025 Auto   Final   Office Visit on 03/13/2025   Component Date Value Ref Range Status    Triglycerides 03/13/2025 105  37 - 140 mg/dL Final    Cholesterol 03/13/2025 151  <=200 mg/dL Final    HDL Cholesterol 03/13/2025 48  35 - 60 mg/dL Final    Cholesterol/HDL Ratio (Risk Factor) 03/13/2025 3.1   Final    Non-HDL 03/13/2025 103  mg/dL Final    LDL Calculated 03/13/2025 82  mg/dL Final    LDL/HDL 03/13/2025 1.7   Final    VLDL 03/13/2025 21  mg/dL Final    Hemoglobin A1C 03/13/2025 5.4  <=7.0 % Final    Estimated Average Glucose 03/13/2025 108  mg/dL Final    Sodium 03/13/2025 144  136 - 145 mmol/L Final    Potassium 03/13/2025 3.6  3.5 - 5.1 mmol/L Final    Chloride 03/13/2025 105  98 - 107 mmol/L Final    CO2 03/13/2025 27  22 - 29 mmol/L Final    Anion Gap 03/13/2025 16  7 - 16 mmol/L Final    Glucose 03/13/2025 62 (L)  74 - 100 mg/dL Final    BUN 03/13/2025 9  7 - 19 mg/dL Final    Creatinine 03/13/2025 0.74  0.55 - 1.02 mg/dL Final    BUN/Creatinine Ratio 03/13/2025 12  6 - 20 Final    Calcium 03/13/2025 9.0  8.4 - 10.2 mg/dL Final    Total Protein 03/13/2025 7.3  6.4 - 8.3 g/dL Final    Albumin 03/13/2025 4.0  3.5 - 5.0 g/dL Final    Globulin 03/13/2025 3.3  2.0 - 4.0 g/dL Final    A/G Ratio 03/13/2025 1.2   Final    Bilirubin, Total 03/13/2025 0.2   <=1.5 mg/dL Final    Alk Phos 03/13/2025 103  40 - 150 U/L Final    ALT 03/13/2025 9  <=55 U/L Final    AST 03/13/2025 24  11 - 45 U/L Final    eGFR 03/13/2025 99  >=60 mL/min/1.73m2 Final    TSH 03/13/2025 3.264  0.350 - 4.940 uIU/mL Final    WBC 03/13/2025 10.90  4.50 - 11.00 K/uL Final    RBC 03/13/2025 4.64  4.20 - 5.40 M/uL Final    Hemoglobin 03/13/2025 12.1  12.0 - 16.0 g/dL Final    Hematocrit 03/13/2025 39.6  38.0 - 47.0 % Final    MCV 03/13/2025 85.3  80.0 - 96.0 fL Final    MCH 03/13/2025 26.1 (L)  27.0 - 31.0 pg Final    MCHC 03/13/2025 30.6 (L)  32.0 - 36.0 g/dL Final    RDW 03/13/2025 14.7 (H)  11.5 - 14.5 % Final    Platelet Count 03/13/2025 272  150 - 400 K/uL Final    MPV 03/13/2025 11.2  9.4 - 12.4 fL Final    Neutrophils % 03/13/2025 60.2  53.0 - 65.0 % Final    Lymphocytes % 03/13/2025 31.2  27.0 - 41.0 % Final    Monocytes % 03/13/2025 6.1 (H)  2.0 - 6.0 % Final    Eosinophils % 03/13/2025 1.4  1.0 - 4.0 % Final    Basophils % 03/13/2025 0.9  0.0 - 1.0 % Final    Immature Granulocytes % 03/13/2025 0.2  0.0 - 0.4 % Final    nRBC, Auto 03/13/2025 0.0  <=0.0 % Final    Neutrophils, Abs 03/13/2025 6.56  1.80 - 7.70 K/uL Final    Lymphocytes, Absolute 03/13/2025 3.40  1.00 - 4.80 K/uL Final    Monocytes, Absolute 03/13/2025 0.67  0.00 - 0.80 K/uL Final    Eosinophils, Absolute 03/13/2025 0.15  0.00 - 0.50 K/uL Final    Basophils, Absolute 03/13/2025 0.10  0.00 - 0.20 K/uL Final    Immature Granulocytes, Absolute 03/13/2025 0.02  0.00 - 0.04 K/uL Final    nRBC, Absolute 03/13/2025 0.00  <=0.00 x10e3/uL Final    Diff Type 03/13/2025 Auto   Final   Admission on 03/11/2025, Discharged on 03/11/2025   Component Date Value Ref Range Status    INFLUENZA A MOLECULAR 03/11/2025 Negative  Negative Final    INFLUENZA B MOLECULAR  03/11/2025 Negative  Negative Final    SARS COV-2 Molecular 03/11/2025 Negative  Negative, Invalid Final   Admission on 01/02/2025, Discharged on 01/02/2025   Component Date Value Ref  Range Status    QRS Duration 2025 74  ms Final    [facility] QTC Calculation 2025 432  ms Final         Orders Placed This Encounter   Procedures    Controlled Substance Monitoring Panel, Random, Urine     Standing Status:   Future     Number of Occurrences:   1     Expected Date:   2025     Expiration Date:   2026     Send normal result to authorizing provider's In Basket if patient is active on MyChart::   Yes    POCT Urine Drug Screen Presump     Interpretive Information:     Negative:  No drug detected at the cut off level.   Positive:  This result represents presumptive positive for the   tested drug, other substances may yield a positive response other   than the analyte of interest. This result should be utilized for   diagnostic purpose only. Confirmation testing will be performed upon physician request only.          Requested Prescriptions     Signed Prescriptions Disp Refills    gabapentin (NEURONTIN) 300 MG capsule 90 capsule 1     Sig: Take 1 capsule (300 mg total) by mouth 3 (three) times daily.    naloxone (NARCAN) 4 mg/actuation Spry 1 each 0     Si spray (4 mg total) by Nasal route once. Call 911, One sprays alternate nostril, may repeat 2-3 minutes as needed for 1 dose       Assessment:     1. Lumbar radiculopathy, chronic    2. Cervical radiculopathy    3. Polyarthralgia    4. Lumbosacral radiculopathy    5. GERALDINE positive    6. Encounter for long-term (current) use of medications                 MRI cervical spine [facility] 2024  FINDINGS:  Alignment: Normal.     Vertebrae: Normal marrow signal. No fracture.     Discs: Normal height and signal.     Cord: Normal.     Skull base and craniocervical junction: Normal.     Degenerative findings:     C2-C3: There is no focal disc herniation.No significant central canal narrowing.  No significant neural foraminal narrowing.     C3-C4: Posterior marginal osteophytic spur effaces anterior thecal sac.No significant central  canal narrowing.  No significant neural foraminal narrowing.     C4-C5: There is no focal disc herniation.No significant central canal narrowing.   Mild right neural foraminal narrowing.     C5-C6: Posterior marginal osteophytic spur effaces anterior thecal sac.Mild central canal narrowing. No significant central canal narrowing.   Mild bilateral neural foraminal narrowing.     C6-C7: Posterior marginal osteophytic spur effaces anterior thecal sac.Mild central canal narrowing.  No significant neural foraminal narrowing.     C7-T1: There is no focal disc herniation.No significant central canal narrowing.  No significant neural foraminal narrowing.     Paraspinal muscles & soft tissues: Unremarkable.     Impression:     Posterior marginal osteophytic disc spur complex at C3-4 and C6-7       X-ray lumbar spine [facility] September 26, 2024  Degenerative facet changes are present from L3 through S1.  Mild degenerative disc changes are present at L2-3 and L3-4.  No subluxation is seen.  SI joints are normal.  There is abundant stool.     Impression:     Degenerative changes are present from L3 through S1.      Plan:    Sent to  March 27, 2025  No show  February 10, 2025  November 13, 2024  April 23, 2024  October 25, 2023    Former patient Dr. Olivares 2009    Has seen Neurology [facility] 2024 headaches      Procedure not completed cervical epidural steroid injection January 30, 2025 January 22, 2025 telephone encounter patient reschedule procedure due to death in the family    Patient cancel cervical epidural steroid injection due to tooth abscess January 9, 2025 January 6, 2025 telephone encounter patient canceled procedures scheduled for January 9, 2025 had the flu    Physical therapy [facility] December 4, 2024          Vitamin-D level 10.6, April 2, 2025  Started vitamin-D replacement    April 3, 2025 GERALDINE positive CRP elevated 1.2  Referral rheumatology [facility]        Amna Gautam      Complaint multiple areas of joint pain muscle pain upper lower extremities ongoing for many years getting worse with time     Again today complaint neck pain arm pain radicular in nature     She is considering cervical spine procedure    Discussed lab results with patient waiting for rheumatology appointment    She states rheumatology [facility] appointment 2025    Patient requesting resume tramadol     Presumptive/definitive drug screen today    Presumptive drug screen positive [facility] will order definitive for clarification    She states she is not using [facility] products     Continue gabapentin as directed    She understands 0 tolerance [facility]     If definitive drug screen negative [facility] will refill tramadol    Continue home exercise program as directed    Follow-up 2 months    Dr. Hirsch 2025    Bring original prescription medication bottles/container/box with labels to each visit                [1]   Social History  Socioeconomic History    Marital status: Single   Tobacco Use    Smoking status: Former     Current packs/day: 0.00     Types: Cigarettes     Quit date: 3/1/2023     Years since quittin.1     Passive exposure: Current    Smokeless tobacco: Never   Substance and Sexual Activity    Alcohol use: Never    Drug use: Yes     Types: Marijuana     Comment: stopped 2 months ago    Sexual activity: Not Currently     Partners: Male     Birth control/protection: None     Social Drivers of Health     Financial Resource Strain: Medium Risk (4/3/2025)    Overall Financial Resource Strain (CARDIA)     Difficulty of Paying Living Expenses: Somewhat hard   Food Insecurity: Food Insecurity Present (4/3/2025)    Hunger Vital Sign     Worried About Running Out of Food in the Last Year: Often true     Ran Out of Food in the Last Year: Often true   Transportation Needs: Unmet Transportation Needs (4/3/2025)    PRAPARE - Transportation     Lack of Transportation (Medical): Yes     Lack of Transportation  (Non-Medical): Yes   Physical Activity: Inactive (4/3/2025)    Exercise Vital Sign     Days of Exercise per Week: 0 days     Minutes of Exercise per Session: 30 min   Stress: Stress Concern Present (4/3/2025)    [employer] [place] [employer] - Occupational Stress Questionnaire     Feeling of Stress : To some extent   Housing Stability: High Risk (4/3/2025)    Housing Stability Vital Sign     Unable to Pay for Housing in the Last Year: Yes     Number of Times Moved in the Last Year: 0     Homeless in the Last Year: No

## 2025-05-01 ENCOUNTER — TELEPHONE (OUTPATIENT)
Dept: GASTROENTEROLOGY | Facility: CLINIC | Age: 50
End: 2025-05-01

## 2025-05-01 ENCOUNTER — OFFICE VISIT (OUTPATIENT)
Dept: PAIN MEDICINE | Facility: CLINIC | Age: 50
End: 2025-05-01

## 2025-05-01 VITALS
HEIGHT: 63 IN | RESPIRATION RATE: 18 BRPM | HEART RATE: 81 BPM | WEIGHT: 157 LBS | BODY MASS INDEX: 27.82 KG/M2 | DIASTOLIC BLOOD PRESSURE: 82 MMHG | SYSTOLIC BLOOD PRESSURE: 149 MMHG

## 2025-05-01 DIAGNOSIS — M25.50 POLYARTHRALGIA: Chronic | ICD-10-CM

## 2025-05-01 DIAGNOSIS — Z79.899 ENCOUNTER FOR LONG-TERM (CURRENT) USE OF MEDICATIONS: ICD-10-CM

## 2025-05-01 DIAGNOSIS — M54.12 CERVICAL RADICULOPATHY: Chronic | ICD-10-CM

## 2025-05-01 DIAGNOSIS — M54.17 LUMBOSACRAL RADICULOPATHY: ICD-10-CM

## 2025-05-01 DIAGNOSIS — R76.8 ANA POSITIVE: Chronic | ICD-10-CM

## 2025-05-01 DIAGNOSIS — M54.16 LUMBAR RADICULOPATHY, CHRONIC: Primary | Chronic | ICD-10-CM

## 2025-05-01 LAB

## 2025-05-01 PROCEDURE — 99214 OFFICE O/P EST MOD 30 MIN: CPT | Mod: PBBFAC | Performed by: PHYSICIAN ASSISTANT

## 2025-05-01 PROCEDURE — 99999 PR PBB SHADOW E&M-EST. PATIENT-LVL IV: CPT | Mod: PBBFAC,,, | Performed by: PHYSICIAN ASSISTANT

## 2025-05-01 PROCEDURE — 99999PBSHW POCT URINE DRUG SCREEN PRESUMP: Mod: PBBFAC,,,

## 2025-05-01 PROCEDURE — 99214 OFFICE O/P EST MOD 30 MIN: CPT | Mod: S$PBB,,, | Performed by: PHYSICIAN ASSISTANT

## 2025-05-01 PROCEDURE — 80305 DRUG TEST PRSMV DIR OPT OBS: CPT | Mod: PBBFAC | Performed by: PHYSICIAN ASSISTANT

## 2025-05-01 RX ORDER — GABAPENTIN 300 MG/1
300 CAPSULE ORAL 3 TIMES DAILY
Qty: 90 CAPSULE | Refills: 1 | Status: SHIPPED | OUTPATIENT
Start: 2025-05-01

## 2025-05-01 RX ORDER — TRAMADOL HYDROCHLORIDE 50 MG/1
50 TABLET ORAL EVERY 12 HOURS PRN
Qty: 60 TABLET | Refills: 1 | Status: SHIPPED | OUTPATIENT
Start: 2025-05-01 | End: 2025-05-01

## 2025-05-01 RX ORDER — NALOXONE HYDROCHLORIDE 4 MG/.1ML
1 SPRAY NASAL ONCE
Qty: 1 EACH | Refills: 0 | Status: SHIPPED | OUTPATIENT
Start: 2025-05-01 | End: 2025-05-02

## 2025-05-01 NOTE — TELEPHONE ENCOUNTER
Spoke w/ pt - stated she needed to come in for f/u she had completed her medication therapy for H Pylori and she needed to have her testing done - advised that she could have it done w/ pcp or we could place order in system and she could go to lab at her convenience - good vu noted

## 2025-05-01 NOTE — TELEPHONE ENCOUNTER
----- Message from Matthew sent at 5/1/2025  1:47 PM CDT -----  Who Called: Martita Ramirez's Preferred Phone Number on File: [phone] Best Call Back Number, if different:Additional Information: pt was callign to see when she can come in for a fu appt pushed out till July and she was supposed to come back in two weeks ago

## 2025-05-02 ENCOUNTER — TELEPHONE (OUTPATIENT)
Dept: GASTROENTEROLOGY | Facility: CLINIC | Age: 50
End: 2025-05-02

## 2025-05-02 DIAGNOSIS — A04.8 H. PYLORI INFECTION: Primary | ICD-10-CM

## 2025-05-05 DIAGNOSIS — E55.9 VITAMIN D DEFICIENCY: ICD-10-CM

## 2025-05-05 RX ORDER — ERGOCALCIFEROL 1.25 MG/1
50000 CAPSULE ORAL
Qty: 8 CAPSULE | Refills: 0 | Status: SHIPPED | OUTPATIENT
Start: 2025-05-05 | End: 2025-06-24

## 2025-05-07 ENCOUNTER — TELEPHONE (OUTPATIENT)
Dept: GASTROENTEROLOGY | Facility: CLINIC | Age: 50
End: 2025-05-07

## 2025-05-07 ENCOUNTER — RESULTS FOLLOW-UP (OUTPATIENT)
Dept: GASTROENTEROLOGY | Facility: HOSPITAL | Age: 50
End: 2025-05-07

## 2025-05-07 DIAGNOSIS — A04.8 H. PYLORI INFECTION: Primary | ICD-10-CM

## 2025-05-07 NOTE — TELEPHONE ENCOUNTER
Spoke w/ pt - reviewed results of lab - pt stated she completed all med therapy and held protonix for 1 wk - advised I would f/u w/ provider for any additional orders - good vu noted

## 2025-05-07 NOTE — TELEPHONE ENCOUNTER
----- Message from Dev Holbrook MD sent at 5/7/2025  3:30 PM CDT -----  Please find out if patient took all of medication to treat h.pylori. If she did then will need to prescribe a different round of antibiotics.  ----- Message -----  From: Lab, Background User  Sent: 5/7/2025   1:24 PM CDT  To: Dev Holbrook MD

## 2025-05-08 ENCOUNTER — TELEPHONE (OUTPATIENT)
Dept: GASTROENTEROLOGY | Facility: CLINIC | Age: 50
End: 2025-05-08

## 2025-05-08 NOTE — PROGRESS NOTES
Patient had breath testing too early and remained on PPI until one week prior. Will repeat h.pylori breath test and if positive choose a different therapy.

## 2025-05-08 NOTE — TELEPHONE ENCOUNTER
Spoke w/ pt - advised that we would repeat breath test - she is aware to stop ppi x2 wks prior to lab - good vu noted at end of encounter

## 2025-05-28 ENCOUNTER — TELEPHONE (OUTPATIENT)
Dept: GASTROENTEROLOGY | Facility: CLINIC | Age: 50
End: 2025-05-28

## 2025-06-10 ENCOUNTER — HOSPITAL ENCOUNTER (OUTPATIENT)
Dept: GASTROENTEROLOGY | Facility: HOSPITAL | Age: 50
Discharge: HOME | End: 2025-06-10
Attending: FAMILY MEDICINE

## 2025-06-10 DIAGNOSIS — Z12.11 COLON CANCER SCREENING: Primary | ICD-10-CM

## 2025-06-10 DIAGNOSIS — Z12.11 COLON CANCER SCREENING: ICD-10-CM

## 2025-06-10 RX ORDER — POLYETHYLENE GLYCOL 3350, SODIUM SULFATE ANHYDROUS, SODIUM BICARBONATE, SODIUM CHLORIDE, POTASSIUM CHLORIDE 236; 22.74; 6.74; 5.86; 2.97 G/4L; G/4L; G/4L; G/4L; G/4L
POWDER, FOR SOLUTION ORAL ONCE
Qty: 4000 ML | Refills: 0 | Status: SHIPPED | OUTPATIENT
Start: 2025-06-10 | End: 2025-06-11

## 2025-06-10 NOTE — TELEPHONE ENCOUNTER
Call returned to pt - advised that medication is to be sent - provider has to sign order then it will be released to the pharmacy - good vu noted

## 2025-06-11 ENCOUNTER — ANESTHESIA EVENT (OUTPATIENT)
Dept: GASTROENTEROLOGY | Facility: HOSPITAL | Age: 50
End: 2025-06-11

## 2025-06-11 ENCOUNTER — ANESTHESIA (OUTPATIENT)
Dept: GASTROENTEROLOGY | Facility: HOSPITAL | Age: 50
End: 2025-06-11

## 2025-06-11 ENCOUNTER — HOSPITAL ENCOUNTER (OUTPATIENT)
Dept: GASTROENTEROLOGY | Facility: HOSPITAL | Age: 50
Discharge: HOME | End: 2025-06-11
Attending: INTERNAL MEDICINE | Admitting: INTERNAL MEDICINE

## 2025-06-11 VITALS
DIASTOLIC BLOOD PRESSURE: 62 MMHG | WEIGHT: 168 LBS | HEART RATE: 66 BPM | BODY MASS INDEX: 29.77 KG/M2 | SYSTOLIC BLOOD PRESSURE: 105 MMHG | OXYGEN SATURATION: 100 % | RESPIRATION RATE: 15 BRPM | HEIGHT: 63 IN | TEMPERATURE: 97 F

## 2025-06-11 DIAGNOSIS — Z12.11 COLON CANCER SCREENING: ICD-10-CM

## 2025-06-11 PROCEDURE — 27201423 OPTIME MED/SURG SUP & DEVICES STERILE SUPPLY

## 2025-06-11 PROCEDURE — 63600175 PHARM REV CODE 636 W HCPCS: Performed by: NURSE ANESTHETIST, CERTIFIED REGISTERED

## 2025-06-11 PROCEDURE — 27000284 HC CANNULA NASAL: Performed by: NURSE ANESTHETIST, CERTIFIED REGISTERED

## 2025-06-11 PROCEDURE — 88305 TISSUE EXAM BY PATHOLOGIST: CPT | Mod: TC,SUR | Performed by: INTERNAL MEDICINE

## 2025-06-11 PROCEDURE — 25000003 PHARM REV CODE 250: Performed by: NURSE ANESTHETIST, CERTIFIED REGISTERED

## 2025-06-11 PROCEDURE — 37000009 HC ANESTHESIA EA ADD 15 MINS

## 2025-06-11 PROCEDURE — 37000008 HC ANESTHESIA 1ST 15 MINUTES

## 2025-06-11 RX ORDER — LIDOCAINE HYDROCHLORIDE 20 MG/ML
INJECTION, SOLUTION EPIDURAL; INFILTRATION; INTRACAUDAL; PERINEURAL
Status: DISCONTINUED | OUTPATIENT
Start: 2025-06-11 | End: 2025-06-11

## 2025-06-11 RX ORDER — SODIUM CHLORIDE 9 MG/ML
INJECTION, SOLUTION INTRAVENOUS CONTINUOUS PRN
Status: DISCONTINUED | OUTPATIENT
Start: 2025-06-11 | End: 2025-06-11

## 2025-06-11 RX ORDER — SODIUM CHLORIDE, SODIUM LACTATE, POTASSIUM CHLORIDE, CALCIUM CHLORIDE 600; 310; 30; 20 MG/100ML; MG/100ML; MG/100ML; MG/100ML
INJECTION, SOLUTION INTRAVENOUS CONTINUOUS
Status: DISCONTINUED | OUTPATIENT
Start: 2025-06-11 | End: 2025-06-12 | Stop reason: HOSPADM

## 2025-06-11 RX ORDER — PROPOFOL 10 MG/ML
VIAL (ML) INTRAVENOUS
Status: DISCONTINUED | OUTPATIENT
Start: 2025-06-11 | End: 2025-06-11

## 2025-06-11 RX ORDER — SODIUM CHLORIDE 0.9 % (FLUSH) 0.9 %
10 SYRINGE (ML) INJECTION EVERY 6 HOURS PRN
Status: DISCONTINUED | OUTPATIENT
Start: 2025-06-11 | End: 2025-06-12 | Stop reason: HOSPADM

## 2025-06-11 RX ADMIN — SODIUM CHLORIDE: 9 INJECTION, SOLUTION INTRAVENOUS at 09:06

## 2025-06-11 RX ADMIN — PROPOFOL 50 MG: 10 INJECTION, EMULSION INTRAVENOUS at 09:06

## 2025-06-11 RX ADMIN — LIDOCAINE HYDROCHLORIDE 100 MG: 20 INJECTION, SOLUTION EPIDURAL; INFILTRATION; INTRACAUDAL; PERINEURAL at 09:06

## 2025-06-11 RX ADMIN — PROPOFOL 50 MG: 10 INJECTION, EMULSION INTRAVENOUS at 10:06

## 2025-06-11 NOTE — DISCHARGE INSTRUCTIONS
Procedure Date  6/11/25     Impression  Overall Impression:   2 polyps in the transverse colon; performed cold snare        Recommendation  - Repeat colonoscopy in 5 years  - Discharge patient to home  - Advance diet as tolerated  - Continue present medications  - Await pathology results  - Patient has a contact number available for emergencies. The signs and symptoms of potential delayed complications were discussed with the patient. Return to normal activities tomorrow. Written discharge instructions were provided to the patient    THE NURSE WILL CALL YOU WITH YOUR BIOPSY RESULTS IN A FEW DAYS. IF YOU HAVE  [facility] MYCHART YOUR RESULTS WILL APPEAR THERE AS WELL.NO DRIVING, OPERATING EQUIPMENT, OR SIGNING LEGAL DOCUMENTS FOR 24 HOURS.Please call the GI Lab if you have any nausea, vomiting, or abdominal pain.

## 2025-06-11 NOTE — TRANSFER OF CARE
"Anesthesia Transfer of Care Note    Patient: Martita Hilliard    Procedure(s) Performed: * No procedures listed *    Patient location: GI    Anesthesia Type: general    Transport from OR: Transported from OR on room air with adequate spontaneous ventilation    Post pain: adequate analgesia    Post assessment: no apparent anesthetic complications    Post vital signs: stable    Level of consciousness: responds to stimulation    Nausea/Vomiting: no nausea/vomiting    Complications: none    Transfer of care protocol was followed      Last vitals: Visit Vitals  /69 (BP Location: Left arm, Patient Position: Lying)   Pulse 73   Temp 36.1 °C (97 °F) (Oral)   Resp 13   Ht 5' 3" (1.6 m)   Wt 76.2 kg (168 lb)   LMP 06/24/2023 (Exact Date)   SpO2 100%   Breastfeeding No   BMI 29.76 kg/m²     "

## 2025-06-11 NOTE — ANESTHESIA PREPROCEDURE EVALUATION
2025  Martita Hilliard is a 49 y.o., female.  Social History     Socioeconomic History    Marital status: Single   Tobacco Use    Smoking status: Former     Current packs/day: 0.00     Types: Cigarettes     Quit date: 3/1/2023     Years since quittin.2     Passive exposure: Current    Smokeless tobacco: Never   Substance and Sexual Activity    Alcohol use: Never    Drug use: Yes     Types: Marijuana     Comment: stopped 2 months ago    Sexual activity: Not Currently     Partners: Male     Birth control/protection: None     Social Drivers of Health     Financial Resource Strain: Medium Risk (4/3/2025)    Overall Financial Resource Strain (CARDIA)     Difficulty of Paying Living Expenses: Somewhat hard   Food Insecurity: Food Insecurity Present (4/3/2025)    Hunger Vital Sign     Worried About Running Out of Food in the Last Year: Often true     Ran Out of Food in the Last Year: Often true   Transportation Needs: Unmet Transportation Needs (4/3/2025)    PRAPARE - Transportation     Lack of Transportation (Medical): Yes     Lack of Transportation (Non-Medical): Yes   Physical Activity: Inactive (4/3/2025)    Exercise Vital Sign     Days of Exercise per Week: 0 days     Minutes of Exercise per Session: 30 min   Stress: Stress Concern Present (4/3/2025)    [employer] [place] [employer] - Occupational Stress Questionnaire     Feeling of Stress : To some extent   Housing Stability: High Risk (4/3/2025)    Housing Stability Vital Sign     Unable to Pay for Housing in the Last Year: Yes     Number of Times Moved in the Last Year: 0     Homeless in the Last Year: No        Pre-op Assessment    I have reviewed the Patient Summary Reports.     I have reviewed the Nursing Notes. I have reviewed the NPO Status.   I have reviewed the Medications.     Review of Systems  Anesthesia Hx:  No problems with  previous Anesthesia                Social:  Former Smoker       Cardiovascular:     Hypertension                                          Neurological:    Neuromuscular Disease,  Headaches                                 Psych:  Psychiatric History                Past Medical History:   Diagnosis Date    Cervical radiculopathy 10/23/2024    Chronic pancreatitis     she told me this in 2022 when admitted for same thing    Chronic right-sided low back pain with right-sided sciatica 09/26/2024    Crohn's disease     per patient history; no pathology    Degenerative disorder of bone     Essential (primary) hypertension     History of herpes genitalis     Dr. Adrian Harrell    Marijuana dependence     Migraine without aura and without status migrainosus, not intractable 11/22/2024    Dr. [facility]    Tobacco dependence       Past Surgical History:   Procedure Laterality Date    CLOSED REDUCTION OF FRACTURE OF NASAL BONE N/A 2/20/2024    Procedure: CLOSED REDUCTION, FRACTURE, NASAL BONE;  Surgeon: Karlos Gallegos MD;  Location: [facility] OR;  Service: ENT;  Laterality: N/A;    CYSTOSCOPY N/A 6/28/2023    Procedure: CYSTOSCOPY;  Surgeon: Christian Jurado MD;  Location: [facility] OR;  Service: OB/GYN;  Laterality: N/A;    HYSTERECTOMY, TOTAL, LAPAROSCOPIC, WITH SALPINGO-OOPHORECTOMY Bilateral 6/28/2023    Procedure: HYSTERECTOMY,TOTAL,LAPAROSCOPIC,WITH SALPINGO-OOPHORECTOMY;  Surgeon: Christian Jurado MD;  Location: [facility];  Service: OB/GYN;  Laterality: Bilateral;    SINUS SURGERY      TONSILLECTOMY AND ADENOIDECTOMY      TUBAL LIGATION      .rec    Physical Exam  General: Well nourished, Cooperative, Alert and Oriented    Airway:  Mallampati: II     Chest/Lungs:  Clear to auscultation    Heart:  Rate: Normal        Anesthesia Plan  Type of Anesthesia, risks & benefits discussed:    Anesthesia Type: Gen Natural Airway, MAC  Intra-op Monitoring Plan: Standard ASA Monitors  Post Op Pain  Control Plan: multimodal analgesia and IV/PO Opioids PRN  Induction:  IV  Informed Consent: Informed consent signed with the Patient and all parties understand the risks and agree with anesthesia plan.  All questions answered. Patient consented to blood products? Yes  ASA Score: 3  Day of Surgery Review of History & Physical: I have interviewed and examined the patient. I have reviewed the patient's H&P dated: There are no significant changes.     Ready For Surgery From Anesthesia Perspective.     .

## 2025-06-11 NOTE — H&P
History & Physical - Short Stay  Gastroenterology      SUBJECTIVE:     Procedure: Colonoscopy    Chief Complaint/Indication for Procedure: Screening for colon cancer    (Not in a hospital admission)      Review of patient's allergies indicates:   Allergen Reactions    Ace inhibitors Swelling    Azithromycin Swelling    Ferrous sulfate Anaphylaxis    Penicillins     Rocephin [ceftriaxone]     Terazol 3 [terconazole] Other (See Comments)     Vaginal irritation and swollen        Past Medical History:   Diagnosis Date    Cervical radiculopathy 10/23/2024    Chronic pancreatitis     she told me this in 2022 when admitted for same thing    Chronic right-sided low back pain with right-sided sciatica 09/26/2024    Crohn's disease     per patient history; no pathology    Degenerative disorder of bone     Essential (primary) hypertension     History of herpes genitalis     Dr. Adrian Harrell    Marijuana dependence     Migraine without aura and without status migrainosus, not intractable 11/22/2024    [facility]    Tobacco dependence      Past Surgical History:   Procedure Laterality Date    CLOSED REDUCTION OF FRACTURE OF NASAL BONE N/A 2/20/2024    Procedure: CLOSED REDUCTION, FRACTURE, NASAL BONE;  Surgeon: Karlos Gallegos MD;  Location: [facility] OR;  Service: ENT;  Laterality: N/A;    CYSTOSCOPY N/A 6/28/2023    Procedure: CYSTOSCOPY;  Surgeon: Christian Jurado MD;  Location: [facility] OR;  Service: OB/GYN;  Laterality: N/A;    HYSTERECTOMY, TOTAL, LAPAROSCOPIC, WITH SALPINGO-OOPHORECTOMY Bilateral 6/28/2023    Procedure: HYSTERECTOMY,TOTAL,LAPAROSCOPIC,WITH SALPINGO-OOPHORECTOMY;  Surgeon: Christian Jurado MD;  Location: [facility] OR;  Service: OB/GYN;  Laterality: Bilateral;    SINUS SURGERY      TONSILLECTOMY AND ADENOIDECTOMY      TUBAL LIGATION       Family History   Problem Relation Name Age of Onset    Hypertension Mother      Diabetes Mellitus Mother      Cancer Mother       Hypertension Child       Social History[1]      OBJECTIVE:     Vital Signs (Most Recent)  Temp: 98.3 °F (36.8 °C) (25)  Pulse: 75 (25)  Resp: 17 (25)  BP: 136/80 (25)  SpO2: 99 % (25)    Physical Exam:                                                       General appearance: alert, cooperative, no distress, comfortable appearing  HENT: Normocephalic, atraumatic, neck symmetrical  Eyes: conjunctivae clear  Lungs: No labored breathing  Abd: Soft, non-tender    ASSESSMENT/PLAN:     Assessment: Screening for colon cancer    Plan: Colonoscopy         [1]   Social History  Tobacco Use    Smoking status: Former     Current packs/day: 0.00     Types: Cigarettes     Quit date: 3/1/2023     Years since quittin.2     Passive exposure: Current    Smokeless tobacco: Never   Substance Use Topics    Alcohol use: Never    Drug use: Yes     Types: Marijuana     Comment: stopped 2 months ago

## 2025-06-11 NOTE — PLAN OF CARE
At 1047 pt is awk and alert, vs are stable, iv and monitor has been removed, pt is waiting on MD to speak to her before dc.

## 2025-06-12 NOTE — ANESTHESIA POSTPROCEDURE EVALUATION
Anesthesia Post Evaluation    Patient: Martita Hilliard    Procedure(s) Performed: * No procedures listed *    Final Anesthesia Type: general      Patient location during evaluation: GI PACU  Patient participation: Yes- Able to Participate  Level of consciousness: responds to stimulation  Post-procedure vital signs: reviewed and stable  Pain management: adequate  Airway patency: patent  SOFIE mitigation strategies: Multimodal analgesia  PONV status at discharge: No PONV  Anesthetic complications: no      Cardiovascular status: hemodynamically stable  Respiratory status: unassisted, spontaneous ventilation and room air  Hydration status: euvolemic  Follow-up not needed.  Comments: The pt was not arousable even with painful stimulation during the procedure.   Refer to nursing note for pain/galdino score upon discharge from recovery.               Vitals Value Taken Time   /61 06/11/25 10:48   Temp 36.1 °C (97 °F) 06/11/25 10:25   Pulse 48 06/11/25 10:48   Resp 17 06/11/25 10:45   SpO2 100 % 06/11/25 10:45   Vitals shown include unfiled device data.      Event Time   Out of Recovery 11:08:18         Pain/Galdino Score: Galdino Score: 10 (6/11/2025 10:39 AM)

## 2025-06-16 ENCOUNTER — TELEPHONE (OUTPATIENT)
Dept: GASTROENTEROLOGY | Facility: CLINIC | Age: 50
End: 2025-06-16

## 2025-06-16 ENCOUNTER — RESULTS FOLLOW-UP (OUTPATIENT)
Dept: GASTROENTEROLOGY | Facility: HOSPITAL | Age: 50
End: 2025-06-16

## 2025-06-16 NOTE — TELEPHONE ENCOUNTER
----- Message from Dve Holbrook MD sent at 6/16/2025  2:38 PM CDT -----  Please advise patient that polyp pathology was reviewed and is benign and is the adenomatous type which is a precancerous/risk factor for cancer. Repeat colonoscopy recommended in 5 years. Place   reminder in system for repeat colonoscopy.  ----- Message -----  From: Lab, Background User  Sent: 6/12/2025   8:24 AM CDT  To: Dev Holbrook MD

## 2025-06-25 ENCOUNTER — OFFICE VISIT (OUTPATIENT)
Dept: PAIN MEDICINE | Facility: CLINIC | Age: 50
End: 2025-06-25

## 2025-06-25 VITALS
HEIGHT: 63 IN | WEIGHT: 160 LBS | HEART RATE: 80 BPM | SYSTOLIC BLOOD PRESSURE: 128 MMHG | DIASTOLIC BLOOD PRESSURE: 83 MMHG | RESPIRATION RATE: 18 BRPM | BODY MASS INDEX: 28.35 KG/M2

## 2025-06-25 DIAGNOSIS — M25.50 POLYARTHRALGIA: ICD-10-CM

## 2025-06-25 DIAGNOSIS — M54.12 CERVICAL RADICULOPATHY: Primary | ICD-10-CM

## 2025-06-25 DIAGNOSIS — E55.9 VITAMIN D DEFICIENCY: ICD-10-CM

## 2025-06-25 DIAGNOSIS — R76.8 ANA POSITIVE: ICD-10-CM

## 2025-06-25 DIAGNOSIS — Z79.899 ENCOUNTER FOR LONG-TERM (CURRENT) USE OF MEDICATIONS: ICD-10-CM

## 2025-06-25 PROCEDURE — 99999 PR PBB SHADOW E&M-EST. PATIENT-LVL V: CPT | Mod: PBBFAC,,, | Performed by: PAIN MEDICINE

## 2025-06-25 PROCEDURE — 99215 OFFICE O/P EST HI 40 MIN: CPT | Mod: PBBFAC | Performed by: PAIN MEDICINE

## 2025-06-25 PROCEDURE — 99214 OFFICE O/P EST MOD 30 MIN: CPT | Mod: S$PBB,,, | Performed by: PAIN MEDICINE

## 2025-06-25 RX ORDER — ACETAMINOPHEN AND CODEINE PHOSPHATE 300; 30 MG/1; MG/1
1 TABLET ORAL 2 TIMES DAILY PRN
Qty: 60 TABLET | Refills: 0 | Status: SHIPPED | OUTPATIENT
Start: 2025-06-25 | End: 2025-07-25

## 2025-06-25 RX ORDER — ERGOCALCIFEROL 1.25 MG/1
50000 CAPSULE ORAL
Qty: 8 CAPSULE | Refills: 0 | Status: SHIPPED | OUTPATIENT
Start: 2025-06-25

## 2025-06-25 RX ORDER — GABAPENTIN 300 MG/1
300 CAPSULE ORAL 3 TIMES DAILY
Qty: 90 CAPSULE | Refills: 1 | Status: SHIPPED | OUTPATIENT
Start: 2025-06-25 | End: 2025-08-24

## 2025-06-25 NOTE — PATIENT INSTRUCTIONS
YOU ARE SCHEDULED ON 07/10/2025 AT 1:30 PM ARRIVAL TIME FOR YOUR PROCEDURE- C6-7 YAJAIRA WITH .        Procedure Instructions:    Nothing to eat or drink for 8 hours or after midnight including gum, candy, mints, or tobacco products.  If you are scheduled for 1:30 or later nothing to eat or drink after 5 a.m. the morning of the procedure, including gum, candy, mints, or tobacco products.  Must have a  at least 18 yrs of age to stay present at all times  No Diabetic medications the morning of procedure, check blood sugar the morning of procedure, if it is greater than 200 call the office at [phone]  If you are started on antibiotics or have been prescribed antibiotics, have a fever, or have any other type of infection call to reschedule procedure.  If you take blood pressure medications you can take it at your regular scheduled time with a small sip of WATER!  Dentures are to be removed prior to procedure or we can provide you with a denture cup to remove them prior to being taken back for procedure.   False eyelashes are to be removed before the morning of procedure.    Contacts will have to be removed prior to procedure.  No jewelry is to be worn to the procedure.     HOLD ASPIRIN AND ASPIRIN PRODUCTS  (ASPIRIN, BC POWDER ETC. ) FOR 7 DAYS  PRIOR TO PROCEDURE  HOLD NSAIDS( ibuprofen, mobic, meloxicam, advil, diclofenac, naproxen, relafen, celebrex,  methotrexate, aleve etc....)  FOR 3 DAYS   PRIOR TO PROCEDURE        SIGNATURE:____________________________________________________________________________________________________

## 2025-06-25 NOTE — PROGRESS NOTES
Kami Hirsch M.D.  [facility]  [address] [place] [address], [place] [id number]    Established    Chief complaint: Shoulder Pain (bilateral), Leg Pain (bilateral), Neck Pain, and Hip Pain (left)       HPI:   History of Present Illness    CHIEF COMPLAINT:  Chronic spine pain, primarily affecting the right side.    HPI:  Patient presents for follow-up of chronic pain issues. She reports spine pain radiating from her neck into her shoulder, down her right side, right hip, and right leg. Pain has been ongoing for years and is worsening. Pain is constant, with her experiencing severe discomfort daily, sometimes resulting in incapacitation.    She experiences severe headaches that wake her from sleep, and occasionally experiences temporary vision loss. Pain radiates to her arms and shoulders, limiting her ability to dress herself and perform daily activities. She also reports occasional numbness and tingling in her hands and fingers.    Current treatment includes Neurontin twice daily, which she reports is not very effective. She has been advised to avoid ibuprofen and similar medications due to stomach problems, including a small ulcer discovered during a colonoscopy and upper endoscopy.    She is in the process of applying for disability due to her chronic pain affecting her ability to work. She has a rheumatology consult scheduled for November, with a suspicion of lupus. She is also vitamin D deficient but has not been able to take the prescribed supplement due to insurance issues.        PREVIOUS TREATMENTS:  Patient had an epidural for the neck scheduled in January of this year, but it was canceled due to personal circumstances.    IMAGING:  An MRI of the C spine revealed bone spurs and osteophytes, with narrowing at C3-C4 and C6-C7 levels.    MEDICATIONS:  Patient is on Neurontin twice daily, which provides minimal benefit. She was previously prescribed Vitamin D but was unable to obtain it due to insurance  issues.    WORK STATUS:  Patient is unable to work due to chronic pain. She is applying for disability due to long-standing pain issues.    SOCIAL HISTORY:  Patient has a history of marijuana use since age 12 but has not used it for 3 months. A previous drug screen showed marijuana present in her system.        Pain Assessment  Pain Assessment: 0-10  Pain Score: 10-Worst pain ever  Pain Location: Neck  Pain Radiating Towards: bilateral shoulder/ legs, left hip  Pain Descriptors: Aching, Throbbing  Pain Frequency: Constant/continuous  Pain Onset: Awakened from sleep  Clinical Progression: Gradually worsening  Aggravating Factors: Bending, Standing, Walking  Pain Intervention(s): Medication (See eMAR), Home medication        Review of patient's allergies indicates:   Allergen Reactions    Ace inhibitors Swelling    Azithromycin Swelling    Ferrous sulfate Anaphylaxis    Penicillins     Rocephin [ceftriaxone]     Terazol 3 [terconazole] Other (See Comments)     Vaginal irritation and swollen       Current Medications[1]    HISTORY:  Past Medical History:   Diagnosis Date    Cervical radiculopathy 10/23/2024    Chronic pancreatitis     she told me this in 2022 when admitted for same thing    Chronic right-sided low back pain with right-sided sciatica 09/26/2024    Crohn's disease     per patient history; no pathology    Degenerative disorder of bone     Essential (primary) hypertension     History of herpes genitalis     Dr. Adrian Harrell    Marijuana dependence     Migraine without aura and without status migrainosus, not intractable 11/22/2024    Dr. [facility]    Tobacco dependence       Past Surgical History:   Procedure Laterality Date    CLOSED REDUCTION OF FRACTURE OF NASAL BONE N/A 2/20/2024    Procedure: CLOSED REDUCTION, FRACTURE, NASAL BONE;  Surgeon: Karlos Gallegos MD;  Location: [facility];  Service: ENT;  Laterality: N/A;    CYSTOSCOPY N/A 6/28/2023    Procedure: CYSTOSCOPY;   Surgeon: Christian Jurado MD;  Location: [facility] OR;  Service: OB/GYN;  Laterality: N/A;    HYSTERECTOMY, TOTAL, LAPAROSCOPIC, WITH SALPINGO-OOPHORECTOMY Bilateral 6/28/2023    Procedure: HYSTERECTOMY,TOTAL,LAPAROSCOPIC,WITH SALPINGO-OOPHORECTOMY;  Surgeon: Christian Jurado MD;  Location: [facility] OR;  Service: OB/GYN;  Laterality: Bilateral;    SINUS SURGERY      TONSILLECTOMY AND ADENOIDECTOMY      TUBAL LIGATION        Family History   Problem Relation Name Age of Onset    Hypertension Mother      Diabetes Mellitus Mother      Cancer Mother      Hypertension Child        Social History: She  reports that she quit smoking about 2 years ago. Her smoking use included cigarettes. She has been exposed to tobacco smoke. She has never used smokeless tobacco. She reports current drug use. Drug: Marijuana. She reports that she does not drink alcohol.    Imaging:  Colonoscopy  Narrative: Table formatting from the original result was not included.  Procedure Date  6/11/25    Impression  Overall   Impression:    2 polyps in the transverse colon; performed cold snare    Recommendation  - Repeat colonoscopy in 5 years  - Discharge patient to home  - Advance diet as tolerated  - Continue present medications  - Await pathology results  - Patient has a contact number available for emergencies. The signs and   symptoms of potential delayed complications were discussed with the   patient. Return to normal activities tomorrow. Written discharge   instructions were provided to the patient.    Indication  Colon cancer screening    Post Procedure Diagnosis  Adenoma of colon    Staff present during procedure  Lisa Hamm, CRNA Doreen Dubon ST TechnSera Whitten RN Registered Nurse   Dev Holbrook MD Proceduralist     Medications  General anesthesia - See anesthesia record.    Preprocedure  A history and physical has been performed, and patient medication   allergies have been reviewed. The  patient's tolerance of previous   anesthesia has been reviewed. The risks and benefits of the procedure and   the sedation options and risks were discussed with the patient. All   questions were answered and informed consent obtained.    Details of the Procedure  The patient underwent general anesthesia, which was administered by an   anesthesia professional. The patient's heart rate, blood pressure, level   of consciousness, respirations, oxygen, ECG and ETCO2 were monitored   throughout the procedure. A digital rectal exam was performed. A perianal   exam was performed. The scope was introduced through the anus and advanced   to the cecum. Retroflexion was not performed due to narrow vault. The   quality of bowel preparation was evaluated using the Sawyer Bowel   Preparation Scale with scores of: right colon = 3, transverse colon = 3,   left colon = 3. The total BBPS score was 9. Bowel prep was adequate. The   patient's estimated blood loss was minimal (<5 mL). The procedure was not   difficult. The patient tolerated the procedure well. There were no   apparent adverse events.     Scope: Pediatric Colonoscope  Scope Serial: [id number]    Events    Procedure Events   Event Event Time     Procedure Events   Event Event Time   ENDO SCOPE IN TIME 6/11/2025  9:50 AM   ENDO CECUM REACHED 6/11/2025  9:57 AM   ENDO SCOPE OUT TIME 6/11/2025 10:08 AM     CECAL WITHDRAWAL TIME: 10m 40s    Findings  Two sessile polyps measuring 5-9 mm in the transverse colon; performed   cold snare removal       Labs:  Hospital Outpatient Visit on 06/11/2025   Component Date Value Ref Range Status    Case Report 06/11/2025    Final                    Value:Surgical Pathology                                Case: [id number]                                   Authorizing Provider:  eDv Holbrook MD     Collected:           06/11/2025 10:02 AM          Ordering Location:     [facility] -         Received:            06/11/2025 11:33 AM                                  Endoscopy                                                                    Pathologist:           Michael Puente MD                                                           Specimen:    Large intestine, Colon, Transverse, a. transverse colon polyp x2                           Final Diagnosis 06/11/2025    Final                    Value:A. Transverse colon polyps, polypectomy:  Serrated mucosa; sessile serrated adenoma(s) can not be excluded      Gross Description 06/11/2025    Final                    Value:A. Large intestine, Colon, Transverse: a. transverse colon polyp x2  The specimen is received in formalin designated a. transverse colon polyp x2 and consists of 4 pink-tan tissue fragments that measure from 0.4-0.7 cm in greatest dimension and is entirely submitted in cassette A1.    Grossing was completed by Zi Brewer.      Microscopic Description 06/11/2025    Final                    Value:A microscopic examination was performed and the diagnosis reflects the findings.          Laboratory Notes 06/11/2025    Final                    Value:If this report includes immunohistochemical (IHC) test results, please note the following: IHC studies were interpreted in conjunction with appropriate positive and negative controls which demonstrate the expected positive and negative reactivity. This laboratory is regulated under CLIA as qualified to perform high-complexity testing. IHC tests are used for clinical purposes. They should not be regarded as investigational or research.       Lab Visit on 05/07/2025   Component Date Value Ref Range Status    H. Pylori Breath Test 05/07/2025 Positive (A)  Negative Final   Office Visit on 05/01/2025   Component Date Value Ref Range Status    POC Amphetamines 05/01/2025 Negative  Negative, Inconclusive Final    POC Barbiturates 05/01/2025 Negative  Negative, Inconclusive Final    POC Benzodiazepines 05/01/2025 Negative  Negative,  Inconclusive Final    POC Cocaine 05/01/2025 Negative  Negative, Inconclusive Final    POC THC 05/01/2025 Presumptive Positive (A)  Negative, Inconclusive Final    POC Methadone 05/01/2025 Negative  Negative, Inconclusive Final    POC Methamphetamine 05/01/2025 Negative  Negative, Inconclusive Final    POC Opiates 05/01/2025 Negative  Negative, Inconclusive Final    POC Oxycodone 05/01/2025 Negative  Negative, Inconclusive Final    POC Phencyclidine 05/01/2025 Negative  Negative, Inconclusive Final    POC Methylenedioxymethamphetamine * 05/01/2025 Negative  Negative, Inconclusive Final    POC Tricyclic Antidepressants 05/01/2025 Negative  Negative, Inconclusive Final    POC Buprenorphine 05/01/2025 Negative   Final     Acceptable 05/01/2025 Yes   Final    POC Temperature (Urine) 05/01/2025 92   Final    Creatinine, U 05/01/2025 280.5  mg/dL Final    Specific Gravity 05/01/2025 1.033   Final    pH 05/01/2025 5.3   Final    Oxidants 05/01/2025 Negative  Cutoff: 200 mg/L Final    Comment 05/01/2025 Normal   Final    Codeine 05/01/2025 Not Detected  Cutoff: 25 ng/mL Final    C6BG 05/01/2025 Not Detected  Cutoff: 100 ng/mL Final    Morphine 05/01/2025 Not Detected  Cutoff: 25 ng/mL Final    M6BG 05/01/2025 Not Detected  Cutoff: 100 ng/mL Final    6 Monoacetylmorphine 05/01/2025 Not Detected  Cutoff: 25 ng/mL Final    Hydrocodone 05/01/2025 Not Detected  Cutoff: 25 ng/mL Final    Norhydrocodone 05/01/2025 Not Detected  Cutoff: 25 ng/mL Final    Dihydrocodeine 05/01/2025 Not Detected  Cutoff: 25 ng/mL Final    Hydromorphone 05/01/2025 Not Detected  Cutoff: 25 ng/mL Final    Hydromorphone-3-beta-glucuronide 05/01/2025 Not Detected  Cutoff: 100 ng/mL Final    Oxycodone 05/01/2025 Not Detected  Cutoff: 25 ng/mL Final    Noroxycodone 05/01/2025 Not Detected  Cutoff: 25 ng/mL Final    Oxymorphone 05/01/2025 Not Detected  Cutoff: 25 ng/mL Final    Oxymorphone-3-beta-glucuronid 05/01/2025 Not Detected  Cutoff: 100  ng/mL Final    Noroxymorphone 05/01/2025 Not Detected  Cutoff: 25 ng/mL Final    Fentanyl 05/01/2025 Not Detected  Cutoff: 2 ng/mL Final    Norfentanyl 05/01/2025 Not Detected  Cutoff: 2 ng/mL Final    Meperidine 05/01/2025 Not Detected  Cutoff: 25 ng/mL Final    Normeperidine 05/01/2025 Not Detected  Cutoff: 25 ng/mL Final    Naloxone 05/01/2025 Not Detected  Cutoff: 25 ng/mL Final    Naloxone-3-beta-glucuronide 05/01/2025 Not Detected  Cutoff: 100 ng/mL Final    Methadone 05/01/2025 Not Detected  Cutoff: 25 ng/mL Final    EDDP 05/01/2025 Not Detected  Cutoff: 25 ng/mL Final    Propoxyphene 05/01/2025 Not Detected  Cutoff: 25 ng/mL Final    Norpropoxyphene 05/01/2025 Not Detected  Cutoff: 25 ng/mL Final    Tramadol 05/01/2025 Not Detected  Cutoff: 25 ng/mL Final    O-desmethyltramadol 05/01/2025 Not Detected  Cutoff: 25 ng/mL Final    Tapentadol 05/01/2025 Not Detected  Cutoff: 25 ng/mL Final    N-Desmethyltapentadol 05/01/2025 Not Detected  Cutoff: 50 ng/mL Final    M TAPENTADOL-BETA-GLUCURONIDE 05/01/2025 Not Detected  Cutoff: 100 ng/mL Final    Buprenorphine 05/01/2025 Not Detected  Cutoff: 5 ng/mL Final    Norbuprenorphine 05/01/2025 Not Detected  Cutoff: 5 ng/mL Final    Norbuprenorphine glucuronide 05/01/2025 Not Detected  Cutoff: 20 ng/mL Final    Opioid Interpretation 05/01/2025 SEE COMMENTS   Final    Cocaine 05/01/2025 Negative  Cutoff: 150 ng/mL Final    Tetrahydrocannabinol 05/01/2025 Presumptive Positive (A)  Cutoff: 50 ng/mL Final    Alprazolam 05/01/2025 Not Detected  Cutoff: 10 ng/mL Final    Alpha-Hydroxyalprazolam 05/01/2025 Not Detected  Cutoff: 10 ng/mL Final    Alpha-Hydroxyalprazolam Glucuronide 05/01/2025 Not Detected  Cutoff: 50 ng/mL Final    Chlordiazepoxide 05/01/2025 Not Detected  Cutoff: 10 ng/mL Final    Clobazam 05/01/2025 Not Detected  Cutoff: 10 ng/mL Final    N-Desmethylclobazam 05/01/2025 Not Detected  Cutoff: 200 ng/mL Final    Clonazepam 05/01/2025 Not Detected  Cutoff: 10 ng/mL  Final    7-aminoclonazepam 05/01/2025 Not Detected  Cutoff: 10 ng/mL Final    Diazepam 05/01/2025 Not Detected  Cutoff: 10 ng/mL Final    Nordiazepam 05/01/2025 Not Detected  Cutoff: 10 ng/mL Final    Flunitrazepam 05/01/2025 Not Detected  Cutoff: 10 ng/mL Final    7-aminoflunitrazepam 05/01/2025 Not Detected  Cutoff: 10 ng/mL Final    Flurazepam 05/01/2025 Not Detected  Cutoff: 10 ng/mL Final    2-Hydroxy Ethyl Flurazepam 05/01/2025 Not Detected  Cutoff: 10 ng/mL Final    Lorazepam 05/01/2025 Not Detected  Cutoff: 10 ng/mL Final    Lorazepam Glucuronide 05/01/2025 Not Detected  Cutoff: 50 ng/mL Final    Midazolam 05/01/2025 Not Detected  Cutoff: 10 ng/mL Final    Alpha-Hydroxy Midazolam 05/01/2025 Not Detected  Cutoff: 10 ng/mL Final    Oxazepam 05/01/2025 Not Detected  Cutoff: 10 ng/mL Final    Oxazepam Glucuronide 05/01/2025 Not Detected  Cutoff: 50 ng/mL Final    Prazepam 05/01/2025 Not Detected  Cutoff: 10 ng/mL Final    Temazepam 05/01/2025 Not Detected  Cutoff: 10 ng/mL Final    Temazepam Glucuronide 05/01/2025 Not Detected  Cutoff: 50 ng/mL Final    Triazolam 05/01/2025 Not Detected  Cutoff: 10 ng/mL Final    Alpha-Hydroxy Triazolam 05/01/2025 Not Detected  Cutoff: 10 ng/mL Final    Zolpidem 05/01/2025 Not Detected  Cutoff: 10 ng/mL Final    Zolpidem Phenyl-4-Carboxylic acid 05/01/2025 Not Detected  Cutoff: 10 ng/mL Final    Benzodiazepine Interpretation 05/01/2025 SEE COMMENTS   Final    List Patient's Current Medications 05/01/2025 Unknown   Final    Methamphetamine 05/01/2025 Not Detected  Cutoff: 100 ng/mL Final    Amphetamine 05/01/2025 Not Detected  Cutoff: 100 ng/mL Final    3,4-methylenedioxymethamphetamine * 05/01/2025 Not Detected  Cutoff: 100 ng/mL Final    3,3-hccuregbhukeru-P-ethylamphetam* 05/01/2025 Not Detected  Cutoff: 100 ng/mL Final    3,4-methylenedioxyamphetamine (MDA) 05/01/2025 Not Detected  Cutoff: 100 ng/mL Final    Ephedrine 05/01/2025 Not Detected  Cutoff: 100 ng/mL Final     Pseudoephedrine 05/01/2025 Not Detected  Cutoff: 100 ng/mL Final    Phentermine 05/01/2025 Not Detected  Cutoff: 100 ng/mL Final    Phencyclidine (PCP) 05/01/2025 Not Detected  Cutoff: 20 ng/mL Final    Methylphenidate 05/01/2025 Not Detected  Cutoff: 20 ng/mL Final    Ritalinic acid 05/01/2025 Not Detected  Cutoff: 100 ng/mL Final    Stimulant Interpretation 05/01/2025 SEE COMMENTS   Final    Barbiturates 05/01/2025 Negative  Cutoff: 200 ng/mL Final    Carboxy-THC- by GC/MS 05/01/2025 126  Cutoff: 5 ng/mL Final    Carboxy-THC Interpretation 05/01/2025 Positive.   Final    Delta-8 Carboxy-Tetrahydrocannabin* 05/01/2025 Not Detected  Cutoff: 5 ng/mL Final   Office Visit on 04/03/2025   Component Date Value Ref Range Status    Sodium 04/03/2025 140  136 - 145 mmol/L Final    Potassium 04/03/2025 3.9  3.5 - 5.1 mmol/L Final    Chloride 04/03/2025 106  98 - 107 mmol/L Final    CO2 04/03/2025 26  22 - 29 mmol/L Final    Anion Gap 04/03/2025 12  7 - 16 mmol/L Final    Glucose 04/03/2025 91  74 - 100 mg/dL Final    BUN 04/03/2025 10  7 - 19 mg/dL Final    Creatinine 04/03/2025 0.78  0.55 - 1.02 mg/dL Final    BUN/Creatinine Ratio 04/03/2025 13  6 - 20 Final    Calcium 04/03/2025 9.3  8.4 - 10.2 mg/dL Final    Total Protein 04/03/2025 7.9  6.4 - 8.3 g/dL Final    Albumin 04/03/2025 3.9  3.5 - 5.0 g/dL Final    Globulin 04/03/2025 4.0  2.0 - 4.0 g/dL Final    A/G Ratio 04/03/2025 1.0   Final    Bilirubin, Total 04/03/2025 0.3  <=1.5 mg/dL Final    Alk Phos 04/03/2025 130  40 - 150 U/L Final    ALT 04/03/2025 12  <=55 U/L Final    AST 04/03/2025 23  11 - 45 U/L Final    eGFR 04/03/2025 93  >=60 mL/min/1.73m2 Final    C-Peptide 04/03/2025 2.19  1.78 - 5.19 ng/mL Final    Iron Level 04/03/2025 88  50 - 170 ug/dL Final    Iron Binding Capacity Total 04/03/2025 284  250 - 450 ug/dL Final    Iron Binding Capacity Unsaturated 04/03/2025 196  70 - 310 ug/dL Final    Iron Saturation 04/03/2025 31  20 - 50 % Final    Transferrin  04/03/2025 263  180 - 382 mg/dL Final    WBC 04/03/2025 9.74  4.50 - 11.00 K/uL Final    RBC 04/03/2025 4.67  4.20 - 5.40 M/uL Final    Hemoglobin 04/03/2025 12.5  12.0 - 16.0 g/dL Final    Hematocrit 04/03/2025 40.1  38.0 - 47.0 % Final    MCV 04/03/2025 85.9  80.0 - 96.0 fL Final    MCH 04/03/2025 26.8 (L)  27.0 - 31.0 pg Final    MCHC 04/03/2025 31.2 (L)  32.0 - 36.0 g/dL Final    RDW 04/03/2025 15.2 (H)  11.5 - 14.5 % Final    Platelet Count 04/03/2025 249  150 - 400 K/uL Final    MPV 04/03/2025 11.0  9.4 - 12.4 fL Final    Neutrophils % 04/03/2025 64.1  53.0 - 65.0 % Final    Lymphocytes % 04/03/2025 26.9 (L)  27.0 - 41.0 % Final    Monocytes % 04/03/2025 6.5 (H)  2.0 - 6.0 % Final    Eosinophils % 04/03/2025 1.2  1.0 - 4.0 % Final    Basophils % 04/03/2025 1.0  0.0 - 1.0 % Final    Immature Granulocytes % 04/03/2025 0.3  0.0 - 0.4 % Final    nRBC, Auto 04/03/2025 0.0  <=0.0 % Final    Neutrophils, Abs 04/03/2025 6.24  1.80 - 7.70 K/uL Final    Lymphocytes, Absolute 04/03/2025 2.62  1.00 - 4.80 K/uL Final    Monocytes, Absolute 04/03/2025 0.63  0.00 - 0.80 K/uL Final    Eosinophils, Absolute 04/03/2025 0.12  0.00 - 0.50 K/uL Final    Basophils, Absolute 04/03/2025 0.10  0.00 - 0.20 K/uL Final    Immature Granulocytes, Absolute 04/03/2025 0.03  0.00 - 0.04 K/uL Final    nRBC, Absolute 04/03/2025 0.00  <=0.00 x10e3/uL Final    Diff Type 04/03/2025 Auto   Final   Lab Visit on 04/02/2025   Component Date Value Ref Range Status    CRP, High Senstivity 04/02/2025 1.22 (H)  <0.50 mg/L Final    GAD65 Ab Assay, S 04/02/2025 0.00  <=0.02 nmol/L Final    HLA-B27 Result 04/02/2025 Negative  Not Applicable Final    Interpretation 04/02/2025 SEE COMMENTS   Final    Varicella Zoster 04/02/2025 Positive (A)  Negative, See Ref Lab Report Final    VZV IgG Index 04/02/2025 >3.000 (H)  0.000 - 0.899 Final    Procalcitonin 04/02/2025 0.08  <=0.25 ng/ml Final    Nil Result, TB 04/02/2025 0.02   Final    TB1 Ag minus Nil Result  04/02/2025 0.00   Final    TB2 Ag minus Nil Result 04/02/2025 0.00   Final    Mitogen minus Nil Result, TB 04/02/2025 7.15   Final    QuantiFERON-TB Gold Plus 04/02/2025 Negative  Negative Final    RA Titer 04/02/2025 <13  <=30 IU/mL Final    ESR Westergren 04/02/2025 9  0 - 20 mm/Hr Final    Syphilis Ab Interpretation 04/02/2025 Non-Reactive  Non-Reactive Final    Free T4 04/02/2025 0.92  0.70 - 1.48 ng/dL Final    TSH 04/02/2025 1.262  0.350 - 4.940 uIU/mL Final    Lupus Anticoagulant Tech Interp 04/02/2025 SEE COMMENTS   Final    Prothrombin Time (PT), P 04/02/2025 15.6 (H)  9.4 - 12.5 sec Final    INR 04/02/2025 1.4  0.9 - 1.1 Final    Activated Partial Thrombopl Time, P 04/02/2025 32  25 - 37 sec Final    DRVVT Screen Ratio 04/02/2025 0.77  <1.20 ratio Final    Vitamin D 25-Hydroxy, Blood 04/02/2025 10.6 (L)  30.0 - 80.0 ng/mL Final    Hepatitis C Ab 04/02/2025 Non-Reactive  Non-Reactive Final    Uric Acid 04/02/2025 3.6  2.6 - 6.0 mg/dL Final    Anti-dsDNA 04/02/2025 Negative  Negative Final    Anti-DSDNA (IU) 04/02/2025 14  0 - 24 IU Final    SS-A/Ro Ab, IgG 04/02/2025 0.4  <1.0 (Negative) U Final    SS-B/La Ab, IgG 04/02/2025 <0.2  <1.0 (Negative) U Final    Sm Ab, IgG 04/02/2025 <0.2  <1.0 (Negative) U Final    RNP Ab, IgG 04/02/2025 1.1 (H)  <1.0 (Negative) U Final    Scl 70 Ab, IgG 04/02/2025 <0.2  <1.0 (Negative) U Final    Sugey 1 Ab, IgG 04/02/2025 <0.2  <1.0 (Negative) U Final    GERALDINE Screen 04/02/2025 Positive (A)  Negative Final    ONI Screen 04/02/2025 Negative  Negative Final    ONI Screen (EU) 04/02/2025 2.6  0.0 - 19.9 EUs Final    C3 04/02/2025 94  80 - 173 mg/dL Final    C4 04/02/2025 9 (L)  13 - 46 mg/dL Final    CK 04/02/2025 140  29 - 168 U/L Final    Complement, Total 04/02/2025 41  30 - 75 U/mL Final    CCP 04/02/2025 <16.0  <=19.9 units Final    HIV 1/2 04/02/2025 Non-Reactive  Non-Reactive Final    Anaplasma phagocytophilum Ab, IgG,S 04/02/2025 <1:64  <1:64 titer Final    Ehrlichia  Chaffeensis (HME) Ab, IgG 04/02/2025 <1:64  <1:64 titer Final    Spotted Fever Group Ab, IgG, S 04/02/2025 <1:64  <1:64 Final    Spotted Fever Group Ab, IgM, S 04/02/2025 <1:64  <1:64 Final    Lyme IgG/IgM 04/02/2025 Non-Reactive  Non-Reactive Final    Reviewed by 04/02/2025 SEE COMMENTS   Final    Lupus Anticoagulant Interp 04/02/2025 SEE COMMENTS   Final    Thrombin Time (Bovine), P 04/02/2025 18.2  15.8 - 24.9 sec Final    [place] Generic Orderable 04/02/2025 SEE COMMENTS   Final   Office Visit on 04/02/2025   Component Date Value Ref Range Status    POC Amphetamines 04/02/2025 Negative  Negative, Inconclusive Final    POC Barbiturates 04/02/2025 Negative  Negative, Inconclusive Final    POC Benzodiazepines 04/02/2025 Negative  Negative, Inconclusive Final    POC Cocaine 04/02/2025 Negative  Negative, Inconclusive Final    POC THC 04/02/2025 Presumptive Positive (A)  Negative, Inconclusive Final    POC Methadone 04/02/2025 Negative  Negative, Inconclusive Final    POC Methamphetamine 04/02/2025 Negative  Negative, Inconclusive Final    POC Opiates 04/02/2025 Negative  Negative, Inconclusive Final    POC Oxycodone 04/02/2025 Negative  Negative, Inconclusive Final    POC Phencyclidine 04/02/2025 Negative  Negative, Inconclusive Final    POC Methylenedioxymethamphetamine * 04/02/2025 Negative  Negative, Inconclusive Final    POC Tricyclic Antidepressants 04/02/2025 Negative  Negative, Inconclusive Final    POC Buprenorphine 04/02/2025 Negative   Final     Acceptable 04/02/2025 Yes   Final    POC Temperature (Urine) 04/02/2025 90   Final    Creatinine, U 04/02/2025 101.1  mg/dL Final    Specific Gravity 04/02/2025 1.023   Final    pH 04/02/2025 5.9   Final    Oxidants 04/02/2025 Negative  Cutoff: 200 mg/L Final    Comment 04/02/2025 Normal   Final    Codeine 04/02/2025 Not Detected  Cutoff: 25 ng/mL Final    C6BG 04/02/2025 Not Detected  Cutoff: 100 ng/mL Final    Morphine 04/02/2025 Not Detected  Cutoff:  25 ng/mL Final    M6BG 04/02/2025 Not Detected  Cutoff: 100 ng/mL Final    6 Monoacetylmorphine 04/02/2025 Not Detected  Cutoff: 25 ng/mL Final    Hydrocodone 04/02/2025 Not Detected  Cutoff: 25 ng/mL Final    Norhydrocodone 04/02/2025 Not Detected  Cutoff: 25 ng/mL Final    Dihydrocodeine 04/02/2025 Not Detected  Cutoff: 25 ng/mL Final    Hydromorphone 04/02/2025 Not Detected  Cutoff: 25 ng/mL Final    Hydromorphone-3-beta-glucuronide 04/02/2025 Not Detected  Cutoff: 100 ng/mL Final    Oxycodone 04/02/2025 Not Detected  Cutoff: 25 ng/mL Final    Noroxycodone 04/02/2025 Not Detected  Cutoff: 25 ng/mL Final    Oxymorphone 04/02/2025 Not Detected  Cutoff: 25 ng/mL Final    Oxymorphone-3-beta-glucuronid 04/02/2025 Not Detected  Cutoff: 100 ng/mL Final    Noroxymorphone 04/02/2025 Not Detected  Cutoff: 25 ng/mL Final    Fentanyl 04/02/2025 Not Detected  Cutoff: 2 ng/mL Final    Norfentanyl 04/02/2025 Not Detected  Cutoff: 2 ng/mL Final    Meperidine 04/02/2025 Not Detected  Cutoff: 25 ng/mL Final    Normeperidine 04/02/2025 Not Detected  Cutoff: 25 ng/mL Final    Naloxone 04/02/2025 Not Detected  Cutoff: 25 ng/mL Final    Naloxone-3-beta-glucuronide 04/02/2025 Not Detected  Cutoff: 100 ng/mL Final    Methadone 04/02/2025 Not Detected  Cutoff: 25 ng/mL Final    EDDP 04/02/2025 Not Detected  Cutoff: 25 ng/mL Final    Propoxyphene 04/02/2025 Not Detected  Cutoff: 25 ng/mL Final    Norpropoxyphene 04/02/2025 Not Detected  Cutoff: 25 ng/mL Final    Tramadol 04/02/2025 Not Detected  Cutoff: 25 ng/mL Final    O-desmethyltramadol 04/02/2025 Not Detected  Cutoff: 25 ng/mL Final    Tapentadol 04/02/2025 Not Detected  Cutoff: 25 ng/mL Final    N-Desmethyltapentadol 04/02/2025 Not Detected  Cutoff: 50 ng/mL Final    M TAPENTADOL-BETA-GLUCURONIDE 04/02/2025 Not Detected  Cutoff: 100 ng/mL Final    Buprenorphine 04/02/2025 Not Detected  Cutoff: 5 ng/mL Final    Norbuprenorphine 04/02/2025 Not Detected  Cutoff: 5 ng/mL Final     Norbuprenorphine glucuronide 04/02/2025 Not Detected  Cutoff: 20 ng/mL Final    Opioid Interpretation 04/02/2025 SEE COMMENTS   Final    Cocaine 04/02/2025 Negative  Cutoff: 150 ng/mL Final    Tetrahydrocannabinol 04/02/2025 Presumptive Positive (A)  Cutoff: 50 ng/mL Final    Alprazolam 04/02/2025 Not Detected  Cutoff: 10 ng/mL Final    Alpha-Hydroxyalprazolam 04/02/2025 Not Detected  Cutoff: 10 ng/mL Final    Alpha-Hydroxyalprazolam Glucuronide 04/02/2025 Not Detected  Cutoff: 50 ng/mL Final    Chlordiazepoxide 04/02/2025 Not Detected  Cutoff: 10 ng/mL Final    Clobazam 04/02/2025 Not Detected  Cutoff: 10 ng/mL Final    N-Desmethylclobazam 04/02/2025 Not Detected  Cutoff: 200 ng/mL Final    Clonazepam 04/02/2025 Not Detected  Cutoff: 10 ng/mL Final    7-aminoclonazepam 04/02/2025 Not Detected  Cutoff: 10 ng/mL Final    Diazepam 04/02/2025 Not Detected  Cutoff: 10 ng/mL Final    Nordiazepam 04/02/2025 Not Detected  Cutoff: 10 ng/mL Final    Flunitrazepam 04/02/2025 Not Detected  Cutoff: 10 ng/mL Final    7-aminoflunitrazepam 04/02/2025 Not Detected  Cutoff: 10 ng/mL Final    Flurazepam 04/02/2025 Not Detected  Cutoff: 10 ng/mL Final    2-Hydroxy Ethyl Flurazepam 04/02/2025 Not Detected  Cutoff: 10 ng/mL Final    Lorazepam 04/02/2025 Not Detected  Cutoff: 10 ng/mL Final    Lorazepam Glucuronide 04/02/2025 Not Detected  Cutoff: 50 ng/mL Final    Midazolam 04/02/2025 Not Detected  Cutoff: 10 ng/mL Final    Alpha-Hydroxy Midazolam 04/02/2025 Not Detected  Cutoff: 10 ng/mL Final    Oxazepam 04/02/2025 Not Detected  Cutoff: 10 ng/mL Final    Oxazepam Glucuronide 04/02/2025 Not Detected  Cutoff: 50 ng/mL Final    Prazepam 04/02/2025 Not Detected  Cutoff: 10 ng/mL Final    Temazepam 04/02/2025 Not Detected  Cutoff: 10 ng/mL Final    Temazepam Glucuronide 04/02/2025 Not Detected  Cutoff: 50 ng/mL Final    Triazolam 04/02/2025 Not Detected  Cutoff: 10 ng/mL Final    Alpha-Hydroxy Triazolam 04/02/2025 Not Detected  Cutoff: 10  ng/mL Final    Zolpidem 04/02/2025 Not Detected  Cutoff: 10 ng/mL Final    Zolpidem Phenyl-4-Carboxylic acid 04/02/2025 Not Detected  Cutoff: 10 ng/mL Final    Benzodiazepine Interpretation 04/02/2025 SEE COMMENTS   Final    List Patient's Current Medications 04/02/2025 Na   Final    Methamphetamine 04/02/2025 Not Detected  Cutoff: 100 ng/mL Final    Amphetamine 04/02/2025 Not Detected  Cutoff: 100 ng/mL Final    3,4-methylenedioxymethamphetamine * 04/02/2025 Not Detected  Cutoff: 100 ng/mL Final    3,3-hfrrnadongltzx-G-ethylamphetam* 04/02/2025 Not Detected  Cutoff: 100 ng/mL Final    3,4-methylenedioxyamphetamine (MDA) 04/02/2025 Not Detected  Cutoff: 100 ng/mL Final    Ephedrine 04/02/2025 Not Detected  Cutoff: 100 ng/mL Final    Pseudoephedrine 04/02/2025 Not Detected  Cutoff: 100 ng/mL Final    Phentermine 04/02/2025 Not Detected  Cutoff: 100 ng/mL Final    Phencyclidine (PCP) 04/02/2025 Not Detected  Cutoff: 20 ng/mL Final    Methylphenidate 04/02/2025 Not Detected  Cutoff: 20 ng/mL Final    Ritalinic acid 04/02/2025 Not Detected  Cutoff: 100 ng/mL Final    Stimulant Interpretation 04/02/2025 SEE COMMENTS   Final    Barbiturates 04/02/2025 Negative  Cutoff: 200 ng/mL Final    Carboxy-THC- by GC/MS 04/02/2025 46  Cutoff: 5 ng/mL Final    Carboxy-THC Interpretation 04/02/2025 Positive.   Final    Delta-8 Carboxy-Tetrahydrocannabin* 04/02/2025 Not Detected  Cutoff: 5 ng/mL Final   Admission on 03/23/2025, Discharged on 03/25/2025   Component Date Value Ref Range Status    Sodium 03/23/2025 137  136 - 145 mmol/L Final    Potassium 03/23/2025 2.9 (L)  3.5 - 5.1 mmol/L Final    Chloride 03/23/2025 102  98 - 107 mmol/L Final    CO2 03/23/2025 19 (L)  22 - 29 mmol/L Final    Anion Gap 03/23/2025 19 (H)  7 - 16 mmol/L Final    Glucose 03/23/2025 145 (H)  74 - 100 mg/dL Final    BUN 03/23/2025 13  7 - 19 mg/dL Final    Creatinine 03/23/2025 0.82  0.55 - 1.02 mg/dL Final    BUN/Creatinine Ratio 03/23/2025 16  6 - 20  Final    Calcium 03/23/2025 9.3  8.4 - 10.2 mg/dL Final    Total Protein 03/23/2025 7.6  6.4 - 8.3 g/dL Final    Albumin 03/23/2025 4.2  3.5 - 5.0 g/dL Final    Globulin 03/23/2025 3.4  2.0 - 4.0 g/dL Final    A/G Ratio 03/23/2025 1.2   Final    Bilirubin, Total 03/23/2025 0.4  <=1.5 mg/dL Final    Alk Phos 03/23/2025 123  40 - 150 U/L Final    ALT 03/23/2025 13  <=55 U/L Final    AST 03/23/2025 30  11 - 45 U/L Final    eGFR 03/23/2025 88  >=60 mL/min/1.73m2 Final    Lipase 03/23/2025 25  <=60 U/L Final    Color, UA 03/23/2025 Yellow  Colorless, Straw, Yellow, Dark Yellow, Light Yellow Final    Clarity, UA 03/23/2025 Clear  Clear Final    pH, UA 03/23/2025 5.5  5.0 to 8.0 pH Units Final    Leukocytes, UA 03/23/2025 Negative  Negative Final    Nitrites, UA 03/23/2025 Negative  Negative Final    Protein, UA 03/23/2025 Negative  Negative Final    Glucose, UA 03/23/2025 Normal  Normal mg/dL Final    Ketones, UA 03/23/2025 Negative  Negative mg/dL Final    Urobilinogen, UA 03/23/2025 Normal  0.2, 1.0, Normal mg/dL Final    Bilirubin, UA 03/23/2025 Negative  Negative Final    Blood, UA 03/23/2025 Large (A)  Negative Final    Specific Gravity, UA 03/23/2025 1.023  <=1.030 Final    Magnesium 03/23/2025 1.9  1.6 - 2.6 mg/dL Final    WBC 03/23/2025 19.72 (H)  4.50 - 11.00 K/uL Final    RBC 03/23/2025 4.84  4.20 - 5.40 M/uL Final    Hemoglobin 03/23/2025 12.8  12.0 - 16.0 g/dL Final    Hematocrit 03/23/2025 40.1  38.0 - 47.0 % Final    MCV 03/23/2025 82.9  80.0 - 96.0 fL Final    MCH 03/23/2025 26.4 (L)  27.0 - 31.0 pg Final    MCHC 03/23/2025 31.9 (L)  32.0 - 36.0 g/dL Final    RDW 03/23/2025 13.8  11.5 - 14.5 % Final    Platelet Count 03/23/2025 261  150 - 400 K/uL Final    MPV 03/23/2025 10.4  9.4 - 12.4 fL Final    Neutrophils % 03/23/2025 81.9 (H)  53.0 - 65.0 % Final    Lymphocytes % 03/23/2025 12.1 (L)  27.0 - 41.0 % Final    Monocytes % 03/23/2025 4.7  2.0 - 6.0 % Final    Eosinophils % 03/23/2025 0.3 (L)  1.0 - 4.0 %  Final    Basophils % 03/23/2025 0.4  0.0 - 1.0 % Final    Immature Granulocytes % 03/23/2025 0.6 (H)  0.0 - 0.4 % Final    nRBC, Auto 03/23/2025 0.0  <=0.0 % Final    Neutrophils, Abs 03/23/2025 16.17 (H)  1.80 - 7.70 K/uL Final    Lymphocytes, Absolute 03/23/2025 2.39  1.00 - 4.80 K/uL Final    Monocytes, Absolute 03/23/2025 0.92 (H)  0.00 - 0.80 K/uL Final    Eosinophils, Absolute 03/23/2025 0.05  0.00 - 0.50 K/uL Final    Basophils, Absolute 03/23/2025 0.07  0.00 - 0.20 K/uL Final    Immature Granulocytes, Absolute 03/23/2025 0.12 (H)  0.00 - 0.04 K/uL Final    nRBC, Absolute 03/23/2025 0.00  <=0.00 x10e3/uL Final    Diff Type 03/23/2025 Auto   Final    Fecal Occult Blood 03/23/2025 Positive   Final    Culture, Blood 03/23/2025 No Growth at 5 days   Final    Culture, Blood 03/23/2025 No Growth at 5 days   Final    WBC, UA 03/23/2025 2  <=5 /hpf Final    RBC, UA 03/23/2025 7 (H)  <=3 /hpf Final    Bacteria, UA 03/23/2025 Few (A)  None Seen /hpf Final    Squamous Epithelial Cells, UA 03/23/2025 Occasional (A)  None Seen /HPF Final    Mucous 03/23/2025 Occasional (A)  None Seen /LPF Final    POC PH 03/23/2025 7.35  7.32 - 7.42 Final    POC PCO2 03/23/2025 12 (LL)  41 - 51 mmHg Final    POC PO2 03/23/2025 47 (H)  25 - 40 mmHg Final    POC Sodium 03/23/2025 150 (H)  136 - 145 mmol/L Final    POC Potassium 03/23/2025 0.6 (LL)  3.4 - 4.5 mmol/L Final    POC Ionized Calcium 03/23/2025 0.37 (LL)  1.15 - 1.35 mmol/L Final    POCT Glucose 03/23/2025 37 (L)  60 - 95 mg/dL Final    POC Lactate 03/23/2025 0.4  0.3 - 1.2 mmol/L Final    POC Hematocrit 03/23/2025 <15 (LL)  35 - 51 % Final    POC HCO3 03/23/2025 6.6 (LL)  24.0 - 28.0 mmol/L Final    POC CO2 03/23/2025 7.0  mmol/L Final    POC Base Excess 03/23/2025 -16.1 (L)  -2.0 - 3.0 mmol/L Final    POC SATURATED O2 03/23/2025 80 (H)  40 - 70 % Final    Barbiturates, Urine 03/24/2025 Negative  Negative Final    Benzodiazepine, Urine 03/24/2025 Negative  Negative Final     Opiates, Urine 03/24/2025 Negative  Negative Final    Phencyclidine, Urine 03/24/2025 Negative  Negative Final    Amphetamine, Urine 03/24/2025 Negative  Negative Final    Cannabinoid, Urine 03/24/2025 Positive (A)  Negative Final    Cocaine, Urine 03/24/2025 Negative  Negative Final    QRS Duration 03/23/2025 80  ms Final    [facility] QTC Calculation 03/23/2025 443  ms Final    Case Report 03/24/2025    Final                    Value:Surgical Pathology                                Case: [id number]                                   Authorizing Provider:  Dev Holbrook MD     Collected:           03/24/2025 02:15 PM          Ordering Location:     [facility] -         Received:            03/24/2025 03:37 PM                                 Endoscopy                                                                    Pathologist:           Michale Puente MD                                                           Specimens:   A) - Stomach, a. gastric fundus bx                                                                  B) - Stomach, b. gastric antrum and body bx                                                Final Diagnosis 03/24/2025    Final                    Value:A. Gastric fundus, biopsy:  Oxyntic and transitional mucosa with minimal chronic gastritis; H. pylori immunohistochemistry is positive    B. Gastric antrum and body, biopsies:  Antral and oxyntic mucosa with mild to moderate active chronic gastritis; H. pylori immunohistochemistry is positive      Gross Description 03/24/2025    Final                    Value:A. Stomach: a. gastric fundus bx  The specimen is received in formalin designated a. gastric fundus bx and consists of a single pink-tan tissue fragment measures 0.4 cm in greatest dimension and is entirely submitted in cassette A1.    Grossing was completed by Zi Brewer.  B. Stomach: b. gastric antrum and body bx  The specimen is received in formalin designated b. gastric  antrum and body bx and consists of 3 pink-tan tissue fragments that measure from 0.2-0.5 cm in greatest dimension and is entirely submitted in cassette B1.    Grossing was completed by Zi Brewer.      Microscopic Description 03/24/2025    Final                    Value:A microscopic examination was performed and the diagnosis reflects the findings.          Laboratory Notes 03/24/2025    Final                    Value:If this report includes immunohistochemical (IHC) test results, please note the following: IHC studies were interpreted in conjunction with appropriate positive and negative controls which demonstrate the expected positive and negative reactivity. This laboratory is regulated under CLIA as qualified to perform high-complexity testing. IHC tests are used for clinical purposes. They should not be regarded as investigational or research.        Sodium 03/25/2025 139  136 - 145 mmol/L Final    Potassium 03/25/2025 3.9  3.5 - 5.1 mmol/L Final    Chloride 03/25/2025 111 (H)  98 - 107 mmol/L Final    CO2 03/25/2025 24  22 - 29 mmol/L Final    Anion Gap 03/25/2025 8  7 - 16 mmol/L Final    Glucose 03/25/2025 80  74 - 100 mg/dL Final    BUN 03/25/2025 7  7 - 19 mg/dL Final    Creatinine 03/25/2025 0.71  0.55 - 1.02 mg/dL Final    BUN/Creatinine Ratio 03/25/2025 10  6 - 20 Final    Calcium 03/25/2025 8.5  8.4 - 10.2 mg/dL Final    Total Protein 03/25/2025 5.8 (L)  6.4 - 8.3 g/dL Final    Albumin 03/25/2025 3.2 (L)  3.5 - 5.0 g/dL Final    Globulin 03/25/2025 2.6  2.0 - 4.0 g/dL Final    A/G Ratio 03/25/2025 1.2   Final    Bilirubin, Total 03/25/2025 0.5  <=1.5 mg/dL Final    Alk Phos 03/25/2025 89  40 - 150 U/L Final    ALT 03/25/2025 9  <=55 U/L Final    AST 03/25/2025 19  11 - 45 U/L Final    eGFR 03/25/2025 104  >=60 mL/min/1.73m2 Final    WBC 03/25/2025 8.62  4.50 - 11.00 K/uL Final    RBC 03/25/2025 4.17 (L)  4.20 - 5.40 M/uL Final    Hemoglobin 03/25/2025 10.9 (L)  12.0 - 16.0 g/dL Final     Hematocrit 03/25/2025 35.4 (L)  38.0 - 47.0 % Final    MCV 03/25/2025 84.9  80.0 - 96.0 fL Final    MCH 03/25/2025 26.1 (L)  27.0 - 31.0 pg Final    MCHC 03/25/2025 30.8 (L)  32.0 - 36.0 g/dL Final    RDW 03/25/2025 14.4  11.5 - 14.5 % Final    Platelet Count 03/25/2025 211  150 - 400 K/uL Final    MPV 03/25/2025 10.5  9.4 - 12.4 fL Final    Neutrophils % 03/25/2025 58.8  53.0 - 65.0 % Final    Lymphocytes % 03/25/2025 33.8  27.0 - 41.0 % Final    Monocytes % 03/25/2025 5.2  2.0 - 6.0 % Final    Eosinophils % 03/25/2025 1.3  1.0 - 4.0 % Final    Basophils % 03/25/2025 0.8  0.0 - 1.0 % Final    Immature Granulocytes % 03/25/2025 0.1  0.0 - 0.4 % Final    nRBC, Auto 03/25/2025 0.0  <=0.0 % Final    Neutrophils, Abs 03/25/2025 5.07  1.80 - 7.70 K/uL Final    Lymphocytes, Absolute 03/25/2025 2.91  1.00 - 4.80 K/uL Final    Monocytes, Absolute 03/25/2025 0.45  0.00 - 0.80 K/uL Final    Eosinophils, Absolute 03/25/2025 0.11  0.00 - 0.50 K/uL Final    Basophils, Absolute 03/25/2025 0.07  0.00 - 0.20 K/uL Final    Immature Granulocytes, Absolute 03/25/2025 0.01  0.00 - 0.04 K/uL Final    nRBC, Absolute 03/25/2025 0.00  <=0.00 x10e3/uL Final    Diff Type 03/25/2025 Auto   Final   Office Visit on 03/13/2025   Component Date Value Ref Range Status    Triglycerides 03/13/2025 105  37 - 140 mg/dL Final    Cholesterol 03/13/2025 151  <=200 mg/dL Final    HDL Cholesterol 03/13/2025 48  35 - 60 mg/dL Final    Cholesterol/HDL Ratio (Risk Factor) 03/13/2025 3.1   Final    Non-HDL 03/13/2025 103  mg/dL Final    LDL Calculated 03/13/2025 82  mg/dL Final    LDL/HDL 03/13/2025 1.7   Final    VLDL 03/13/2025 21  mg/dL Final    Hemoglobin A1C 03/13/2025 5.4  <=7.0 % Final    Estimated Average Glucose 03/13/2025 108  mg/dL Final    Sodium 03/13/2025 144  136 - 145 mmol/L Final    Potassium 03/13/2025 3.6  3.5 - 5.1 mmol/L Final    Chloride 03/13/2025 105  98 - 107 mmol/L Final    CO2 03/13/2025 27  22 - 29 mmol/L Final    Anion Gap  03/13/2025 16  7 - 16 mmol/L Final    Glucose 03/13/2025 62 (L)  74 - 100 mg/dL Final    BUN 03/13/2025 9  7 - 19 mg/dL Final    Creatinine 03/13/2025 0.74  0.55 - 1.02 mg/dL Final    BUN/Creatinine Ratio 03/13/2025 12  6 - 20 Final    Calcium 03/13/2025 9.0  8.4 - 10.2 mg/dL Final    Total Protein 03/13/2025 7.3  6.4 - 8.3 g/dL Final    Albumin 03/13/2025 4.0  3.5 - 5.0 g/dL Final    Globulin 03/13/2025 3.3  2.0 - 4.0 g/dL Final    A/G Ratio 03/13/2025 1.2   Final    Bilirubin, Total 03/13/2025 0.2  <=1.5 mg/dL Final    Alk Phos 03/13/2025 103  40 - 150 U/L Final    ALT 03/13/2025 9  <=55 U/L Final    AST 03/13/2025 24  11 - 45 U/L Final    eGFR 03/13/2025 99  >=60 mL/min/1.73m2 Final    TSH 03/13/2025 3.264  0.350 - 4.940 uIU/mL Final    WBC 03/13/2025 10.90  4.50 - 11.00 K/uL Final    RBC 03/13/2025 4.64  4.20 - 5.40 M/uL Final    Hemoglobin 03/13/2025 12.1  12.0 - 16.0 g/dL Final    Hematocrit 03/13/2025 39.6  38.0 - 47.0 % Final    MCV 03/13/2025 85.3  80.0 - 96.0 fL Final    MCH 03/13/2025 26.1 (L)  27.0 - 31.0 pg Final    MCHC 03/13/2025 30.6 (L)  32.0 - 36.0 g/dL Final    RDW 03/13/2025 14.7 (H)  11.5 - 14.5 % Final    Platelet Count 03/13/2025 272  150 - 400 K/uL Final    MPV 03/13/2025 11.2  9.4 - 12.4 fL Final    Neutrophils % 03/13/2025 60.2  53.0 - 65.0 % Final    Lymphocytes % 03/13/2025 31.2  27.0 - 41.0 % Final    Monocytes % 03/13/2025 6.1 (H)  2.0 - 6.0 % Final    Eosinophils % 03/13/2025 1.4  1.0 - 4.0 % Final    Basophils % 03/13/2025 0.9  0.0 - 1.0 % Final    Immature Granulocytes % 03/13/2025 0.2  0.0 - 0.4 % Final    nRBC, Auto 03/13/2025 0.0  <=0.0 % Final    Neutrophils, Abs 03/13/2025 6.56  1.80 - 7.70 K/uL Final    Lymphocytes, Absolute 03/13/2025 3.40  1.00 - 4.80 K/uL Final    Monocytes, Absolute 03/13/2025 0.67  0.00 - 0.80 K/uL Final    Eosinophils, Absolute 03/13/2025 0.15  0.00 - 0.50 K/uL Final    Basophils, Absolute 03/13/2025 0.10  0.00 - 0.20 K/uL Final    Immature Granulocytes,  "Absolute 03/13/2025 0.02  0.00 - 0.04 K/uL Final    nRBC, Absolute 03/13/2025 0.00  <=0.00 x10e3/uL Final    Diff Type 03/13/2025 Auto   Final   Admission on 03/11/2025, Discharged on 03/11/2025   Component Date Value Ref Range Status    INFLUENZA A MOLECULAR 03/11/2025 Negative  Negative Final    INFLUENZA B MOLECULAR  03/11/2025 Negative  Negative Final    SARS COV-2 Molecular 03/11/2025 Negative  Negative, Invalid Final   Admission on 01/02/2025, Discharged on 01/02/2025   Component Date Value Ref Range Status    QRS Duration 01/02/2025 74  ms Final    [facility] QTC Calculation 01/02/2025 432  ms Final   There may be more visits with results that are not included.       Vitals:   Vitals:    06/25/25 0837   BP: 128/83   Pulse: 80   Resp: 18   Weight: 72.6 kg (160 lb)   Height: 5' 3" (1.6 m)   PainSc: 10-Worst pain ever   PainLoc: Neck        Review of Systems   Constitutional: Negative.    HENT: Negative.     Eyes: Negative.    Respiratory: Negative.     Cardiovascular: Negative.    Gastrointestinal: Negative.    Endocrine: Negative.    Genitourinary: Negative.    Musculoskeletal:  Positive for arthralgias, back pain, myalgias and neck pain.   Integumentary:  Negative.   Neurological:  Positive for weakness and numbness (RUE).   Hematological: Negative.    Psychiatric/Behavioral:  Positive for sleep disturbance.        PHYSICAL EXAMINATION:  Physical Exam  Vitals and nursing note reviewed.   Constitutional:       General: She is not in acute distress.     Appearance: Normal appearance. She is not ill-appearing, toxic-appearing or diaphoretic.   HENT:      Head: Normocephalic and atraumatic.      Nose: Nose normal.      Mouth/Throat:      Mouth: Mucous membranes are moist.   Eyes:      Extraocular Movements: Extraocular movements intact.      Pupils: Pupils are equal, round, and reactive to light.   Cardiovascular:      Rate and Rhythm: Normal rate and regular rhythm.      Heart sounds: Normal heart sounds.   Pulmonary: "      Effort: Pulmonary effort is normal. No respiratory distress.      Breath sounds: Normal breath sounds. No stridor. No wheezing or rhonchi.   Abdominal:      General: Bowel sounds are normal.      Palpations: Abdomen is soft.   Musculoskeletal:         General: No swelling or deformity.      Left hand: Tenderness and bony tenderness present. Decreased range of motion.      Cervical back: Tenderness and bony tenderness present. No spasms. Pain with movement present. Decreased range of motion.      Thoracic back: Normal.      Lumbar back: Tenderness and bony tenderness present. No spasms. Decreased range of motion. Negative right straight leg raise test and negative left straight leg raise test. No scoliosis.      Right lower leg: No edema.      Left lower leg: No edema.      Comments: Cervical pain with flexion, extension lateral rotation.  Cervical spinous process tenderness to palpation from  C4-C7.  Cervical facet tenderness palpation from C4-7.   Skin:     General: Skin is warm.   Neurological:      General: No focal deficit present.      Mental Status: She is alert and oriented to person, place, and time. Mental status is at baseline.      Cranial Nerves: No cranial nerve deficit.      Sensory: Sensation is intact. No sensory deficit.      Motor: Weakness (Left  strength) present.      Coordination: Coordination normal.      Gait: Gait normal.      Deep Tendon Reflexes:      Reflex Scores:       Tricep reflexes are 1+ on the right side and 1+ on the left side.       Bicep reflexes are 1+ on the right side and 1+ on the left side.  Psychiatric:         Mood and Affect: Mood normal.         Behavior: Behavior normal.              Assessment and Plan:  Plan:  1. reviewed  2.Addiction, Dependency, Tolerance, Opioid abuse-misuse, Death, Diversion Discussed. Overdose reversal drug Naloxone discussed. Patient is prescribed opiates for chronic nonmalignant pain pathology.  Patient is receiving opiates which  require greater than a 72 hour supply of therapy.  Patient was educated on potential dependency associated with long-term opioid use as well as decreasing efficacy with prolonged use.  Patient was advised of risks, benefits and side effects and how to utilize each medication.  Patient was also informed that any deviation from therapy protocol will  lead to discontinuation of opiates.  It is reasonable to prescribe opioid analgesics for patient based on positive response to opioid medications, lack of side effects and  limited aberrant behavior.    3.Refill/Continue medications for pain control and function.  We will prescribe Tylenol No. 3 twice a day for chronic pain.  Vitamin-D 50 time 8 dosages and increase Neurontin to 300 mg t.i.d.       Requested Prescriptions     Signed Prescriptions Disp Refills    ergocalciferol (ERGOCALCIFEROL) 50,000 unit Cap 8 capsule 0     Sig: Take 1 capsule (50,000 Units total) by mouth every 7 days.    gabapentin (NEURONTIN) 300 MG capsule 90 capsule 1     Sig: Take 1 capsule (300 mg total) by mouth 3 (three) times daily.    acetaminophen-codeine 300-30mg (TYLENOL #3) 300-30 mg Tab 60 tablet 0     Sig: Take 1 tablet by mouth 2 (two) times daily as needed (pain).     4.Urine drug screen point of care obtained and consistent with prescribed medications and medication refill date.  Drug screen is to monitor compliance with prescribed opiates and to ensure that there was no misuse/abuse of other non-prescribed opioids or illicit drugs.  From this information I will determine if patient is suitable for opioid therapy  5. Rheumatology consultation with Dr. Tabares in [facility] for positive GERALDINE and chronic pain involving multiple joints  6. Schedule cervical [id number] for cervical radiculopathy  Orders Placed This Encounter   Procedures    Controlled Substance Monitoring Panel, Random, Urine     Standing Status:   Future     Number of Occurrences:   1     Expected Date:   6/25/2025      Expiration Date:   8/24/2026     Send normal result to authorizing provider's In Basket if patient is active on MyChart::   Yes    Ambulatory referral/consult to Rheumatology     Standing Status:   Future     Expected Date:   7/2/2025     Expiration Date:   7/25/2026     Referral Priority:   Routine     Referral Type:   Consultation     Referral Reason:   Specialty Services Required     Referred to Provider:   Cayetano Tabares MD     Requested Specialty:   Rheumatology     Number of Visits Requested:   1    POCT Urine Drug Screen ([facility])     Interpretive Information:     Negative:  No drug detected at the cut off level.   Positive:  This result represents presumptive positive for the   tested drug, other substances may yield a positive response other   than the analyte of interest. This result should be utilized for   diagnostic purpose only. Confirmation testing will be performed upon physician request only.       Case Request Operating Room: C6-7 YAJAIRA and epiduralgram under fluoroscopy     Medical Necessity::   Medically Non-Urgent [100]     Case classification:   E - Elective [90]     Positioning::   Prone [1003]     Post-Procedure Disposition::   PACU [1]     Estimated Length of Stay::   0 midnight     Implant Required::   No [1001]     Is an on-site pathologist required for this procedure?:   N/A      7.Indications for this procedure for this specific patient include the following   -History, physical examination and concordant radiological image based diagnostic testing supports medical necessity for an epidural steroid injection  - neurogenic pain due to central disc pathology, osteophyte complexes, severe degenerative disc disease producing foraminal or central stenosis  - Pt has been in pain for at least 6 weeks and has failed conservative care (e.g. Exercise, physical methods, NSAID/ and or muscle relaxants)   - Pt has no major risk factors for spinal cancer or contraindicated condition   -  Neurogenic claudication and Radicular pain are interfering with functional activity  - Pain is associated with symptoms of nerve root irritation   - Any numbness documented is accompanied with paresthesia   - No evidence of systemic or local infection, bleeding tendency or unstable medical condition   - Epidural provided as part of a comprehensive pain management program  - All repeat injections have at least 80% pain relief and increase functional gain and physical activity, and reduction in reliance on the use of medication and or physical therapy  - Pt has significant functional limitation resulting in diminished quality of life and impaired age appropriate ADL's.   - Diagnostic evaluation has ruled out other causes of pain  - Pt participating in an active rehabilitation program or home exercise program which has been discussed with the patient including heat ice and rest  - No more than 3 epidurals will be done in a 6 month period at the same level with at least 7 days between injections  - MAC is only offered in cases of needle phobia   - Injection done at C6-7 level which is consistent with patient's dermatomal pain complaint    8.Monitored Anesthesia Care medical necessity authorization request:    Monitor anesthesia request is medically indicated for the scheduled nerve block procedure due to:  - needle phobia and anxiety, placing  the patient at risk during the provided service.  -patient has an ASA class greater than 3 and requires constant presence of an anesthesiologist during the procedure:  -patient has severe problems with muscles and muscle spasticity that makes it hard to lie still  -patient suffers from chronic pain and is unable to function due to  diminished ADLs    9.The planned medically necessary  surgical procedure is performed in a hospital outpatient department and not in an ambulatory surgical center due to:     -there is no geographically assessable ambulatory surgery center that has the   necessary equipment and fluoroscopy needed for the procedure     -there is no geographically assessable ambulatory surgical center available at which the physician has privileges     -an ASC's  specific  guideline regarding the individuals weight or health conditions that prevent the use of an ASC       -injections must be performed under fluoroscopy image guidance with contrast unless the patient has a documented contrast allergy or pregnancy              report:  Reviewed and consistent with medication use as prescribed.  Assessment & Plan    CERVICAL RADICULOPATHY AND SPINAL STENOSIS:  - Cervical epidural steroid injections scheduled for cervical radiculopathy.  - Increase Neurontin to 3 times daily.    VITAMIN D DEFICIENCY:  - Prescribe vitamin D supplement for deficiency    POTENTIAL LUPUS:  - Referred to Dr. Tabares, rheumatologist in [place], for earlier evaluation of potential lupus.    SUBSTANCE USE:  - Ordered urine drug screen for compliance prior to opioid prescribing.    FOLLOW-UP:  - Follow up after cervical epidural steroid injection for re-evaluation.         This note was generated with the assistance of ambient listening technology. Verbal consent was obtained by the patient and accompanying visitor(s) for the recording of patient appointment to facilitate this note. I attest to having reviewed and edited the generated note for accuracy, though some syntax or spelling errors may persist. Please contact the author of this note for any clarification.       Date of encounter: 6/25/2025  Kami Hirsch MD            [1]   Current Outpatient Medications:     azelastine (ASTELIN) 137 mcg (0.1 %) nasal spray, 1 spray (137 mcg total) by Nasal route 2 (two) times daily., Disp: 30 mL, Rfl: 2    gabapentin (NEURONTIN) 300 MG capsule, Take 1 capsule (300 mg total) by mouth 3 (three) times daily., Disp: 90 capsule, Rfl: 1    sertraline (ZOLOFT) 25 MG tablet, Take 25 mg by mouth every evening., Disp: , Rfl:      acetaminophen-codeine 300-30mg (TYLENOL #3) 300-30 mg Tab, Take 1 tablet by mouth 2 (two) times daily as needed (pain)., Disp: 60 tablet, Rfl: 0    ergocalciferol (ERGOCALCIFEROL) 50,000 unit Cap, Take 1 capsule (50,000 Units total) by mouth every 7 days., Disp: 8 capsule, Rfl: 0    gabapentin (NEURONTIN) 300 MG capsule, Take 1 capsule (300 mg total) by mouth 3 (three) times daily., Disp: 90 capsule, Rfl: 1    hydroCHLOROthiazide 12.5 MG Tab, Take 1 tablet (12.5 mg total) by mouth once daily. for 30 doses, Disp: 30 tablet, Rfl: 0    loratadine (CLARITIN) 10 mg tablet, Take 1 tablet (10 mg total) by mouth once daily. for 30 doses, Disp: 30 tablet, Rfl: 0    pantoprazole (PROTONIX) 40 MG tablet, Take 1 tablet (40 mg total) by mouth 2 (two) times daily., Disp: 60 tablet, Rfl: 0

## 2025-07-01 DIAGNOSIS — M54.12 CERVICAL RADICULOPATHY: Primary | ICD-10-CM

## 2025-07-01 DIAGNOSIS — M54.41 CHRONIC BILATERAL LOW BACK PAIN WITH BILATERAL SCIATICA: ICD-10-CM

## 2025-07-01 DIAGNOSIS — G89.29 CHRONIC BILATERAL LOW BACK PAIN WITH BILATERAL SCIATICA: ICD-10-CM

## 2025-07-01 DIAGNOSIS — M54.42 CHRONIC BILATERAL LOW BACK PAIN WITH BILATERAL SCIATICA: ICD-10-CM

## 2025-07-01 RX ORDER — HYDROCODONE BITARTRATE AND ACETAMINOPHEN 10; 325 MG/1; MG/1
1 TABLET ORAL EVERY 12 HOURS PRN
Qty: 30 TABLET | Refills: 0 | Status: SHIPPED | OUTPATIENT
Start: 2025-07-01 | End: 2025-07-31

## 2025-07-09 ENCOUNTER — TELEPHONE (OUTPATIENT)
Dept: PAIN MEDICINE | Facility: CLINIC | Age: 50
End: 2025-07-09
Payer: COMMERCIAL

## 2025-07-09 NOTE — TELEPHONE ENCOUNTER
Patient is scheduled on 07/09/2025 at 13:30 pm for procedure- C6-7 [id number] with Dr. Hirsch.  Procedure is denied due to lack of physical therapy within time line set by her insurance carrier. Voicemail is left instructing patient to call and schedule a follow up and to schedule physical therapy. 07/09/2025@13:47 pm GABRIEL.

## 2025-07-23 ENCOUNTER — OFFICE VISIT (OUTPATIENT)
Dept: PAIN MEDICINE | Facility: CLINIC | Age: 50
End: 2025-07-23
Payer: COMMERCIAL

## 2025-07-23 VITALS
RESPIRATION RATE: 18 BRPM | BODY MASS INDEX: 30.3 KG/M2 | DIASTOLIC BLOOD PRESSURE: 84 MMHG | SYSTOLIC BLOOD PRESSURE: 128 MMHG | WEIGHT: 171 LBS | HEART RATE: 83 BPM | HEIGHT: 63 IN

## 2025-07-23 DIAGNOSIS — M54.12 CERVICAL RADICULOPATHY: ICD-10-CM

## 2025-07-23 DIAGNOSIS — M54.17 LUMBOSACRAL RADICULOPATHY: ICD-10-CM

## 2025-07-23 DIAGNOSIS — M25.50 POLYARTHRALGIA: Primary | ICD-10-CM

## 2025-07-23 DIAGNOSIS — R76.8 ANA POSITIVE: ICD-10-CM

## 2025-07-23 PROCEDURE — 99999PBSHW PR PBB SHADOW TECHNICAL ONLY FILED TO HB: Mod: PBBFAC,JZ,,

## 2025-07-23 PROCEDURE — 99999 PR PBB SHADOW E&M-EST. PATIENT-LVL V: CPT | Mod: PBBFAC,,, | Performed by: PAIN MEDICINE

## 2025-07-23 PROCEDURE — 99215 OFFICE O/P EST HI 40 MIN: CPT | Mod: PBBFAC | Performed by: PAIN MEDICINE

## 2025-07-23 PROCEDURE — 96372 THER/PROPH/DIAG INJ SC/IM: CPT | Mod: PBBFAC | Performed by: PAIN MEDICINE

## 2025-07-23 RX ORDER — HYDROCODONE BITARTRATE AND ACETAMINOPHEN 10; 325 MG/1; MG/1
1 TABLET ORAL EVERY 12 HOURS PRN
Qty: 60 TABLET | Refills: 0 | Status: SHIPPED | OUTPATIENT
Start: 2025-07-23 | End: 2025-08-22

## 2025-07-23 RX ORDER — KETOROLAC TROMETHAMINE 30 MG/ML
60 INJECTION, SOLUTION INTRAMUSCULAR; INTRAVENOUS
Status: COMPLETED | OUTPATIENT
Start: 2025-07-23 | End: 2025-07-23

## 2025-07-23 RX ORDER — HYDROCODONE BITARTRATE AND ACETAMINOPHEN 10; 325 MG/1; MG/1
1 TABLET ORAL EVERY 12 HOURS PRN
Qty: 60 TABLET | Refills: 0 | Status: SHIPPED | OUTPATIENT
Start: 2025-08-22 | End: 2025-09-21

## 2025-07-23 RX ORDER — GABAPENTIN 300 MG/1
300 CAPSULE ORAL 3 TIMES DAILY
Qty: 90 CAPSULE | Refills: 1 | Status: SHIPPED | OUTPATIENT
Start: 2025-07-23 | End: 2025-09-21

## 2025-07-23 RX ADMIN — KETOROLAC TROMETHAMINE 60 MG: 30 INJECTION, SOLUTION INTRAMUSCULAR at 02:07

## 2025-07-24 NOTE — PROGRESS NOTES
Kami Hirsch M.D.  [facility]  [address] [place] [address], [place] [id number]    Established      CHIEF COMPLAINT:  Cervical and lumbar pain with associated radiculopathy.    HPI:  Patient was scheduled for cervical epidural steroid injection, but was denied by insurance due to lack of PT involvement. She reports pain throughout her body, particularly on the right side. Pain radiates from her neck down the right arm to the hand, with associated numbness and tingling. She experiences throbbing sensations and occasional numbness in her right hand. She occasionally drops objects with her right hand. She fell about five days ago.    She also reports lower back pain radiating down to the foot, with associated numbness, tingling, and swelling. She experiences difficulty walking due to swelling. Weakness is present in the lower extremities. She has a history of spinal stenosis, which she was informed about years ago during pain treatment. She describes it as degenerative bone disease.    She is currently taking Neurontin 3 times daily and hydrocodone twice daily, which causes drowsiness. She reports a slight improvement in her condition. Occasionally, she takes ibuprofen or Aleve when pain is unbearable. She describes a recent episode where she could not get out of bed due to swelling, which also affected her fingers.    She has significant discomfort even while sitting in a chair during the consultation. She has an upcoming appointment in November to be evaluated for lupus and rheumatoid arthritis at [facility]. She was found to have abnormal GERALDINE titers.    She denies any left-sided involvement .    PREVIOUS TREATMENTS:  Patient underwent physical therapy but reports it was painful and exacerbated her symptoms. She experienced increased pain throughout her body after each session.    MEDICATIONS:  Patient is on Neurontin (Gabapentin) 3 times daily, which provides minimal improvement and causes drowsiness. She takes  "Hydrocodone twice daily, with one dose in the morning and another before bed. She occasionally uses Ibuprofen and Aleve when the pain becomes unbearable.    MEDICAL HISTORY:  Patient has a history of spinal stenosis    SOCIAL HISTORY:  Patient previously used THC (marijuana) but has since stopped.          Pain Assessment  Pain Assessment: 0-10  Pain Score:   8  Pain Location: Neck  Pain Orientation: Right  Pain Radiating Towards: neck, shoulder, leg, arm  Pain Descriptors: Dull  Pain Frequency: Constant/continuous  Pain Onset: Awakened from sleep  Clinical Progression: Gradually improving  Aggravating Factors: Bending, Standing, Walking  Pain Intervention(s): Medication (See eMAR), Home medication, Heat applied, Cold applied        4A"s of Opioid Risk Assessment  Activity: Patient has difficulty performing  ADL  Analgesia:  Patient's pain is partially controlled by current medication.   Aberrant Behavior:  reviewed with no aberrant drug seeking/taking behavior    Review of patient's allergies indicates:   Allergen Reactions    Ace inhibitors Swelling    Azithromycin Swelling    Ferrous sulfate Anaphylaxis    Penicillins     Rocephin [ceftriaxone]     Terazol 3 [terconazole] Other (See Comments)     Vaginal irritation and swollen       Current Medications[1]    HISTORY:    Past Medical History:   Diagnosis Date    Cervical radiculopathy 10/23/2024    Chronic pancreatitis     she told me this in 2022 when admitted for same thing    Chronic right-sided low back pain with right-sided sciatica 09/26/2024    Crohn's disease     per patient history; no pathology    Degenerative disorder of bone     Essential (primary) hypertension     History of herpes genitalis     Dr. Adrian Harrell    Marijuana dependence     Migraine without aura and without status migrainosus, not intractable 11/22/2024    Dr. [facility]    Tobacco dependence         Past Surgical History:   Procedure Laterality Date    CLOSED " REDUCTION OF FRACTURE OF NASAL BONE N/A 2/20/2024    Procedure: CLOSED REDUCTION, FRACTURE, NASAL BONE;  Surgeon: Karlos Gallegos MD;  Location: [facility] OR;  Service: ENT;  Laterality: N/A;    CYSTOSCOPY N/A 6/28/2023    Procedure: CYSTOSCOPY;  Surgeon: Christian Jurado MD;  Location: [facility] OR;  Service: OB/GYN;  Laterality: N/A;    HYSTERECTOMY, TOTAL, LAPAROSCOPIC, WITH SALPINGO-OOPHORECTOMY Bilateral 6/28/2023    Procedure: HYSTERECTOMY,TOTAL,LAPAROSCOPIC,WITH SALPINGO-OOPHORECTOMY;  Surgeon: Christian Jurado MD;  Location: [facility] OR;  Service: OB/GYN;  Laterality: Bilateral;    SINUS SURGERY      TONSILLECTOMY AND ADENOIDECTOMY      TUBAL LIGATION          Family History   Problem Relation Name Age of Onset    Hypertension Mother      Diabetes Mellitus Mother      Cancer Mother      Hypertension Child          Social History: She  reports that she quit smoking about 2 years ago. Her smoking use included cigarettes. She has been exposed to tobacco smoke. She has never used smokeless tobacco. She reports current drug use. Drug: Marijuana. She reports that she does not drink alcohol.    Imaging:       Labs:  Office Visit on 06/25/2025   Component Date Value Ref Range Status    Creatinine, U 06/25/2025 185.0  mg/dL Final    Specific Gravity 06/25/2025 1.019   Final    pH 06/25/2025 5.9   Final    Oxidants 06/25/2025 Negative  Cutoff: 200 mg/L Final    Comment 06/25/2025 Normal   Final    Codeine 06/25/2025 Not Detected  Cutoff: 25 ng/mL Final    C6BG 06/25/2025 Not Detected  Cutoff: 100 ng/mL Final    Morphine 06/25/2025 Not Detected  Cutoff: 25 ng/mL Final    M6BG 06/25/2025 Not Detected  Cutoff: 100 ng/mL Final    6 Monoacetylmorphine 06/25/2025 Not Detected  Cutoff: 25 ng/mL Final    Hydrocodone 06/25/2025 Not Detected  Cutoff: 25 ng/mL Final    Norhydrocodone 06/25/2025 Not Detected  Cutoff: 25 ng/mL Final    Dihydrocodeine 06/25/2025 Not Detected  Cutoff: 25 ng/mL Final    Hydromorphone  06/25/2025 Not Detected  Cutoff: 25 ng/mL Final    Hydromorphone-3-beta-glucuronide 06/25/2025 Not Detected  Cutoff: 100 ng/mL Final    Oxycodone 06/25/2025 Not Detected  Cutoff: 25 ng/mL Final    Noroxycodone 06/25/2025 Not Detected  Cutoff: 25 ng/mL Final    Oxymorphone 06/25/2025 Not Detected  Cutoff: 25 ng/mL Final    Oxymorphone-3-beta-glucuronid 06/25/2025 Not Detected  Cutoff: 100 ng/mL Final    Noroxymorphone 06/25/2025 Not Detected  Cutoff: 25 ng/mL Final    Fentanyl 06/25/2025 Not Detected  Cutoff: 2 ng/mL Final    Norfentanyl 06/25/2025 Not Detected  Cutoff: 2 ng/mL Final    Meperidine 06/25/2025 Not Detected  Cutoff: 25 ng/mL Final    Normeperidine 06/25/2025 Not Detected  Cutoff: 25 ng/mL Final    Naloxone 06/25/2025 Not Detected  Cutoff: 25 ng/mL Final    Naloxone-3-beta-glucuronide 06/25/2025 Not Detected  Cutoff: 100 ng/mL Final    Methadone 06/25/2025 Not Detected  Cutoff: 25 ng/mL Final    EDDP 06/25/2025 Not Detected  Cutoff: 25 ng/mL Final    Propoxyphene 06/25/2025 Not Detected  Cutoff: 25 ng/mL Final    Norpropoxyphene 06/25/2025 Not Detected  Cutoff: 25 ng/mL Final    Tramadol 06/25/2025 Not Detected  Cutoff: 25 ng/mL Final    O-desmethyltramadol 06/25/2025 Not Detected  Cutoff: 25 ng/mL Final    Tapentadol 06/25/2025 Not Detected  Cutoff: 25 ng/mL Final    N-Desmethyltapentadol 06/25/2025 Not Detected  Cutoff: 50 ng/mL Final    M TAPENTADOL-BETA-GLUCURONIDE 06/25/2025 Not Detected  Cutoff: 100 ng/mL Final    Buprenorphine 06/25/2025 Not Detected  Cutoff: 5 ng/mL Final    Norbuprenorphine 06/25/2025 Not Detected  Cutoff: 5 ng/mL Final    Norbuprenorphine glucuronide 06/25/2025 Not Detected  Cutoff: 20 ng/mL Final    Opioid Interpretation 06/25/2025 SEE COMMENTS   Final    Cocaine 06/25/2025 Negative  Cutoff: 150 ng/mL Final    Tetrahydrocannabinol 06/25/2025 Negative  Cutoff: 50 ng/mL Final    Alprazolam 06/25/2025 Not Detected  Cutoff: 10 ng/mL Final    Alpha-Hydroxyalprazolam 06/25/2025 Not  Detected  Cutoff: 10 ng/mL Final    Alpha-Hydroxyalprazolam Glucuronide 06/25/2025 Not Detected  Cutoff: 50 ng/mL Final    Chlordiazepoxide 06/25/2025 Not Detected  Cutoff: 10 ng/mL Final    Clobazam 06/25/2025 Not Detected  Cutoff: 10 ng/mL Final    N-Desmethylclobazam 06/25/2025 Not Detected  Cutoff: 200 ng/mL Final    Clonazepam 06/25/2025 Not Detected  Cutoff: 10 ng/mL Final    7-aminoclonazepam 06/25/2025 Not Detected  Cutoff: 10 ng/mL Final    Diazepam 06/25/2025 Not Detected  Cutoff: 10 ng/mL Final    Nordiazepam 06/25/2025 Not Detected  Cutoff: 10 ng/mL Final    Flunitrazepam 06/25/2025 Not Detected  Cutoff: 10 ng/mL Final    7-aminoflunitrazepam 06/25/2025 Not Detected  Cutoff: 10 ng/mL Final    Flurazepam 06/25/2025 Not Detected  Cutoff: 10 ng/mL Final    2-Hydroxy Ethyl Flurazepam 06/25/2025 Not Detected  Cutoff: 10 ng/mL Final    Lorazepam 06/25/2025 Not Detected  Cutoff: 10 ng/mL Final    Lorazepam Glucuronide 06/25/2025 Not Detected  Cutoff: 50 ng/mL Final    Midazolam 06/25/2025 Not Detected  Cutoff: 10 ng/mL Final    Alpha-Hydroxy Midazolam 06/25/2025 Not Detected  Cutoff: 10 ng/mL Final    Oxazepam 06/25/2025 Not Detected  Cutoff: 10 ng/mL Final    Oxazepam Glucuronide 06/25/2025 Not Detected  Cutoff: 50 ng/mL Final    Prazepam 06/25/2025 Not Detected  Cutoff: 10 ng/mL Final    Temazepam 06/25/2025 Not Detected  Cutoff: 10 ng/mL Final    Temazepam Glucuronide 06/25/2025 Not Detected  Cutoff: 50 ng/mL Final    Triazolam 06/25/2025 Not Detected  Cutoff: 10 ng/mL Final    Alpha-Hydroxy Triazolam 06/25/2025 Not Detected  Cutoff: 10 ng/mL Final    Zolpidem 06/25/2025 Not Detected  Cutoff: 10 ng/mL Final    Zolpidem Phenyl-4-Carboxylic acid 06/25/2025 Not Detected  Cutoff: 10 ng/mL Final    Benzodiazepine Interpretation 06/25/2025 SEE COMMENTS   Final    List Patient's Current Medications 06/25/2025 na   Final    Methamphetamine 06/25/2025 Not Detected  Cutoff: 100 ng/mL Final    Amphetamine 06/25/2025  Not Detected  Cutoff: 100 ng/mL Final    3,4-methylenedioxymethamphetamine * 06/25/2025 Not Detected  Cutoff: 100 ng/mL Final    3,7-qqjamlrpesenqa-U-ethylamphetam* 06/25/2025 Not Detected  Cutoff: 100 ng/mL Final    3,4-methylenedioxyamphetamine (MDA) 06/25/2025 Not Detected  Cutoff: 100 ng/mL Final    Ephedrine 06/25/2025 Not Detected  Cutoff: 100 ng/mL Final    Pseudoephedrine 06/25/2025 Not Detected  Cutoff: 100 ng/mL Final    Phentermine 06/25/2025 Not Detected  Cutoff: 100 ng/mL Final    Phencyclidine (PCP) 06/25/2025 Not Detected  Cutoff: 20 ng/mL Final    Methylphenidate 06/25/2025 Not Detected  Cutoff: 20 ng/mL Final    Ritalinic acid 06/25/2025 Not Detected  Cutoff: 100 ng/mL Final    Stimulant Interpretation 06/25/2025 SEE COMMENTS   Final    Barbiturates 06/25/2025 Negative  Cutoff: 200 ng/mL Final   Hospital Outpatient Visit on 06/11/2025   Component Date Value Ref Range Status    Case Report 06/11/2025    Final                    Value:Surgical Pathology                                Case: [id number]                                   Authorizing Provider:  Dev Holbrook MD     Collected:           06/11/2025 10:02 AM          Ordering Location:     [facility] -         Received:            06/11/2025 11:33 AM                                 Endoscopy                                                                    Pathologist:           Michael Puente MD                                                           Specimen:    Large intestine, Colon, Transverse, a. transverse colon polyp x2                           Final Diagnosis 06/11/2025    Final                    Value:A. Transverse colon polyps, polypectomy:  Serrated mucosa; sessile serrated adenoma(s) can not be excluded      Gross Description 06/11/2025    Final                    Value:A. Large intestine, Colon, Transverse: a. transverse colon polyp x2  The specimen is received in formalin designated a. transverse colon polyp  x2 and consists of 4 pink-tan tissue fragments that measure from 0.4-0.7 cm in greatest dimension and is entirely submitted in cassette A1.    Grossing was completed by Zi Brewer.      Microscopic Description 06/11/2025    Final                    Value:A microscopic examination was performed and the diagnosis reflects the findings.          Laboratory Notes 06/11/2025    Final                    Value:If this report includes immunohistochemical (IHC) test results, please note the following: IHC studies were interpreted in conjunction with appropriate positive and negative controls which demonstrate the expected positive and negative reactivity. This laboratory is regulated under CLIA as qualified to perform high-complexity testing. IHC tests are used for clinical purposes. They should not be regarded as investigational or research.       Lab Visit on 05/07/2025   Component Date Value Ref Range Status    H. Pylori Breath Test 05/07/2025 Positive (A)  Negative Final   Office Visit on 05/01/2025   Component Date Value Ref Range Status    POC Amphetamines 05/01/2025 Negative  Negative, Inconclusive Final    POC Barbiturates 05/01/2025 Negative  Negative, Inconclusive Final    POC Benzodiazepines 05/01/2025 Negative  Negative, Inconclusive Final    POC Cocaine 05/01/2025 Negative  Negative, Inconclusive Final    POC THC 05/01/2025 Presumptive Positive (A)  Negative, Inconclusive Final    POC Methadone 05/01/2025 Negative  Negative, Inconclusive Final    POC Methamphetamine 05/01/2025 Negative  Negative, Inconclusive Final    POC Opiates 05/01/2025 Negative  Negative, Inconclusive Final    POC Oxycodone 05/01/2025 Negative  Negative, Inconclusive Final    POC Phencyclidine 05/01/2025 Negative  Negative, Inconclusive Final    POC Methylenedioxymethamphetamine * 05/01/2025 Negative  Negative, Inconclusive Final    POC Tricyclic Antidepressants 05/01/2025 Negative  Negative, Inconclusive Final    POC Buprenorphine  05/01/2025 Negative   Final     Acceptable 05/01/2025 Yes   Final    POC Temperature (Urine) 05/01/2025 92   Final    Creatinine, U 05/01/2025 280.5  mg/dL Final    Specific Gravity 05/01/2025 1.033   Final    pH 05/01/2025 5.3   Final    Oxidants 05/01/2025 Negative  Cutoff: 200 mg/L Final    Comment 05/01/2025 Normal   Final    Codeine 05/01/2025 Not Detected  Cutoff: 25 ng/mL Final    C6BG 05/01/2025 Not Detected  Cutoff: 100 ng/mL Final    Morphine 05/01/2025 Not Detected  Cutoff: 25 ng/mL Final    M6BG 05/01/2025 Not Detected  Cutoff: 100 ng/mL Final    6 Monoacetylmorphine 05/01/2025 Not Detected  Cutoff: 25 ng/mL Final    Hydrocodone 05/01/2025 Not Detected  Cutoff: 25 ng/mL Final    Norhydrocodone 05/01/2025 Not Detected  Cutoff: 25 ng/mL Final    Dihydrocodeine 05/01/2025 Not Detected  Cutoff: 25 ng/mL Final    Hydromorphone 05/01/2025 Not Detected  Cutoff: 25 ng/mL Final    Hydromorphone-3-beta-glucuronide 05/01/2025 Not Detected  Cutoff: 100 ng/mL Final    Oxycodone 05/01/2025 Not Detected  Cutoff: 25 ng/mL Final    Noroxycodone 05/01/2025 Not Detected  Cutoff: 25 ng/mL Final    Oxymorphone 05/01/2025 Not Detected  Cutoff: 25 ng/mL Final    Oxymorphone-3-beta-glucuronid 05/01/2025 Not Detected  Cutoff: 100 ng/mL Final    Noroxymorphone 05/01/2025 Not Detected  Cutoff: 25 ng/mL Final    Fentanyl 05/01/2025 Not Detected  Cutoff: 2 ng/mL Final    Norfentanyl 05/01/2025 Not Detected  Cutoff: 2 ng/mL Final    Meperidine 05/01/2025 Not Detected  Cutoff: 25 ng/mL Final    Normeperidine 05/01/2025 Not Detected  Cutoff: 25 ng/mL Final    Naloxone 05/01/2025 Not Detected  Cutoff: 25 ng/mL Final    Naloxone-3-beta-glucuronide 05/01/2025 Not Detected  Cutoff: 100 ng/mL Final    Methadone 05/01/2025 Not Detected  Cutoff: 25 ng/mL Final    EDDP 05/01/2025 Not Detected  Cutoff: 25 ng/mL Final    Propoxyphene 05/01/2025 Not Detected  Cutoff: 25 ng/mL Final    Norpropoxyphene 05/01/2025 Not Detected   Cutoff: 25 ng/mL Final    Tramadol 05/01/2025 Not Detected  Cutoff: 25 ng/mL Final    O-desmethyltramadol 05/01/2025 Not Detected  Cutoff: 25 ng/mL Final    Tapentadol 05/01/2025 Not Detected  Cutoff: 25 ng/mL Final    N-Desmethyltapentadol 05/01/2025 Not Detected  Cutoff: 50 ng/mL Final    M TAPENTADOL-BETA-GLUCURONIDE 05/01/2025 Not Detected  Cutoff: 100 ng/mL Final    Buprenorphine 05/01/2025 Not Detected  Cutoff: 5 ng/mL Final    Norbuprenorphine 05/01/2025 Not Detected  Cutoff: 5 ng/mL Final    Norbuprenorphine glucuronide 05/01/2025 Not Detected  Cutoff: 20 ng/mL Final    Opioid Interpretation 05/01/2025 SEE COMMENTS   Final    Cocaine 05/01/2025 Negative  Cutoff: 150 ng/mL Final    Tetrahydrocannabinol 05/01/2025 Presumptive Positive (A)  Cutoff: 50 ng/mL Final    Alprazolam 05/01/2025 Not Detected  Cutoff: 10 ng/mL Final    Alpha-Hydroxyalprazolam 05/01/2025 Not Detected  Cutoff: 10 ng/mL Final    Alpha-Hydroxyalprazolam Glucuronide 05/01/2025 Not Detected  Cutoff: 50 ng/mL Final    Chlordiazepoxide 05/01/2025 Not Detected  Cutoff: 10 ng/mL Final    Clobazam 05/01/2025 Not Detected  Cutoff: 10 ng/mL Final    N-Desmethylclobazam 05/01/2025 Not Detected  Cutoff: 200 ng/mL Final    Clonazepam 05/01/2025 Not Detected  Cutoff: 10 ng/mL Final    7-aminoclonazepam 05/01/2025 Not Detected  Cutoff: 10 ng/mL Final    Diazepam 05/01/2025 Not Detected  Cutoff: 10 ng/mL Final    Nordiazepam 05/01/2025 Not Detected  Cutoff: 10 ng/mL Final    Flunitrazepam 05/01/2025 Not Detected  Cutoff: 10 ng/mL Final    7-aminoflunitrazepam 05/01/2025 Not Detected  Cutoff: 10 ng/mL Final    Flurazepam 05/01/2025 Not Detected  Cutoff: 10 ng/mL Final    2-Hydroxy Ethyl Flurazepam 05/01/2025 Not Detected  Cutoff: 10 ng/mL Final    Lorazepam 05/01/2025 Not Detected  Cutoff: 10 ng/mL Final    Lorazepam Glucuronide 05/01/2025 Not Detected  Cutoff: 50 ng/mL Final    Midazolam 05/01/2025 Not Detected  Cutoff: 10 ng/mL Final    Alpha-Hydroxy  Midazolam 05/01/2025 Not Detected  Cutoff: 10 ng/mL Final    Oxazepam 05/01/2025 Not Detected  Cutoff: 10 ng/mL Final    Oxazepam Glucuronide 05/01/2025 Not Detected  Cutoff: 50 ng/mL Final    Prazepam 05/01/2025 Not Detected  Cutoff: 10 ng/mL Final    Temazepam 05/01/2025 Not Detected  Cutoff: 10 ng/mL Final    Temazepam Glucuronide 05/01/2025 Not Detected  Cutoff: 50 ng/mL Final    Triazolam 05/01/2025 Not Detected  Cutoff: 10 ng/mL Final    Alpha-Hydroxy Triazolam 05/01/2025 Not Detected  Cutoff: 10 ng/mL Final    Zolpidem 05/01/2025 Not Detected  Cutoff: 10 ng/mL Final    Zolpidem Phenyl-4-Carboxylic acid 05/01/2025 Not Detected  Cutoff: 10 ng/mL Final    Benzodiazepine Interpretation 05/01/2025 SEE COMMENTS   Final    List Patient's Current Medications 05/01/2025 Unknown   Final    Methamphetamine 05/01/2025 Not Detected  Cutoff: 100 ng/mL Final    Amphetamine 05/01/2025 Not Detected  Cutoff: 100 ng/mL Final    3,4-methylenedioxymethamphetamine * 05/01/2025 Not Detected  Cutoff: 100 ng/mL Final    3,4-nfonomqvursgxq-J-ethylamphetam* 05/01/2025 Not Detected  Cutoff: 100 ng/mL Final    3,4-methylenedioxyamphetamine (MDA) 05/01/2025 Not Detected  Cutoff: 100 ng/mL Final    Ephedrine 05/01/2025 Not Detected  Cutoff: 100 ng/mL Final    Pseudoephedrine 05/01/2025 Not Detected  Cutoff: 100 ng/mL Final    Phentermine 05/01/2025 Not Detected  Cutoff: 100 ng/mL Final    Phencyclidine (PCP) 05/01/2025 Not Detected  Cutoff: 20 ng/mL Final    Methylphenidate 05/01/2025 Not Detected  Cutoff: 20 ng/mL Final    Ritalinic acid 05/01/2025 Not Detected  Cutoff: 100 ng/mL Final    Stimulant Interpretation 05/01/2025 SEE COMMENTS   Final    Barbiturates 05/01/2025 Negative  Cutoff: 200 ng/mL Final    Carboxy-THC- by GC/MS 05/01/2025 126  Cutoff: 5 ng/mL Final    Carboxy-THC Interpretation 05/01/2025 Positive.   Final    Delta-8 Carboxy-Tetrahydrocannabin* 05/01/2025 Not Detected  Cutoff: 5 ng/mL Final   Office Visit on 04/03/2025    Component Date Value Ref Range Status    Sodium 04/03/2025 140  136 - 145 mmol/L Final    Potassium 04/03/2025 3.9  3.5 - 5.1 mmol/L Final    Chloride 04/03/2025 106  98 - 107 mmol/L Final    CO2 04/03/2025 26  22 - 29 mmol/L Final    Anion Gap 04/03/2025 12  7 - 16 mmol/L Final    Glucose 04/03/2025 91  74 - 100 mg/dL Final    BUN 04/03/2025 10  7 - 19 mg/dL Final    Creatinine 04/03/2025 0.78  0.55 - 1.02 mg/dL Final    BUN/Creatinine Ratio 04/03/2025 13  6 - 20 Final    Calcium 04/03/2025 9.3  8.4 - 10.2 mg/dL Final    Total Protein 04/03/2025 7.9  6.4 - 8.3 g/dL Final    Albumin 04/03/2025 3.9  3.5 - 5.0 g/dL Final    Globulin 04/03/2025 4.0  2.0 - 4.0 g/dL Final    A/G Ratio 04/03/2025 1.0   Final    Bilirubin, Total 04/03/2025 0.3  <=1.5 mg/dL Final    Alk Phos 04/03/2025 130  40 - 150 U/L Final    ALT 04/03/2025 12  <=55 U/L Final    AST 04/03/2025 23  11 - 45 U/L Final    eGFR 04/03/2025 93  >=60 mL/min/1.73m2 Final    C-Peptide 04/03/2025 2.19  1.78 - 5.19 ng/mL Final    Iron Level 04/03/2025 88  50 - 170 ug/dL Final    Iron Binding Capacity Total 04/03/2025 284  250 - 450 ug/dL Final    Iron Binding Capacity Unsaturated 04/03/2025 196  70 - 310 ug/dL Final    Iron Saturation 04/03/2025 31  20 - 50 % Final    Transferrin 04/03/2025 263  180 - 382 mg/dL Final    WBC 04/03/2025 9.74  4.50 - 11.00 K/uL Final    RBC 04/03/2025 4.67  4.20 - 5.40 M/uL Final    Hemoglobin 04/03/2025 12.5  12.0 - 16.0 g/dL Final    Hematocrit 04/03/2025 40.1  38.0 - 47.0 % Final    MCV 04/03/2025 85.9  80.0 - 96.0 fL Final    MCH 04/03/2025 26.8 (L)  27.0 - 31.0 pg Final    MCHC 04/03/2025 31.2 (L)  32.0 - 36.0 g/dL Final    RDW 04/03/2025 15.2 (H)  11.5 - 14.5 % Final    Platelet Count 04/03/2025 249  150 - 400 K/uL Final    MPV 04/03/2025 11.0  9.4 - 12.4 fL Final    Neutrophils % 04/03/2025 64.1  53.0 - 65.0 % Final    Lymphocytes % 04/03/2025 26.9 (L)  27.0 - 41.0 % Final    Monocytes % 04/03/2025 6.5 (H)  2.0 - 6.0 % Final     Eosinophils % 04/03/2025 1.2  1.0 - 4.0 % Final    Basophils % 04/03/2025 1.0  0.0 - 1.0 % Final    Immature Granulocytes % 04/03/2025 0.3  0.0 - 0.4 % Final    nRBC, Auto 04/03/2025 0.0  <=0.0 % Final    Neutrophils, Abs 04/03/2025 6.24  1.80 - 7.70 K/uL Final    Lymphocytes, Absolute 04/03/2025 2.62  1.00 - 4.80 K/uL Final    Monocytes, Absolute 04/03/2025 0.63  0.00 - 0.80 K/uL Final    Eosinophils, Absolute 04/03/2025 0.12  0.00 - 0.50 K/uL Final    Basophils, Absolute 04/03/2025 0.10  0.00 - 0.20 K/uL Final    Immature Granulocytes, Absolute 04/03/2025 0.03  0.00 - 0.04 K/uL Final    nRBC, Absolute 04/03/2025 0.00  <=0.00 x10e3/uL Final    Diff Type 04/03/2025 Auto   Final   Lab Visit on 04/02/2025   Component Date Value Ref Range Status    CRP, High Senstivity 04/02/2025 1.22 (H)  <0.50 mg/L Final    GAD65 Ab Assay, S 04/02/2025 0.00  <=0.02 nmol/L Final    HLA-B27 Result 04/02/2025 Negative  Not Applicable Final    Interpretation 04/02/2025 SEE COMMENTS   Final    Varicella Zoster 04/02/2025 Positive (A)  Negative, See Ref Lab Report Final    VZV IgG Index 04/02/2025 >3.000 (H)  0.000 - 0.899 Final    Procalcitonin 04/02/2025 0.08  <=0.25 ng/ml Final    Nil Result, TB 04/02/2025 0.02   Final    TB1 Ag minus Nil Result 04/02/2025 0.00   Final    TB2 Ag minus Nil Result 04/02/2025 0.00   Final    Mitogen minus Nil Result, TB 04/02/2025 7.15   Final    QuantiFERON-TB Gold Plus 04/02/2025 Negative  Negative Final    RA Titer 04/02/2025 <13  <=30 IU/mL Final    ESR Westergren 04/02/2025 9  0 - 20 mm/Hr Final    Syphilis Ab Interpretation 04/02/2025 Non-Reactive  Non-Reactive Final    Free T4 04/02/2025 0.92  0.70 - 1.48 ng/dL Final    TSH 04/02/2025 1.262  0.350 - 4.940 uIU/mL Final    Lupus Anticoagulant Tech Interp 04/02/2025 SEE COMMENTS   Final    Prothrombin Time (PT), P 04/02/2025 15.6 (H)  9.4 - 12.5 sec Final    INR 04/02/2025 1.4  0.9 - 1.1 Final    Activated Partial Thrombopl Time, P 04/02/2025 32  25  - 37 sec Final    DRVVT Screen Ratio 04/02/2025 0.77  <1.20 ratio Final    Vitamin D 25-Hydroxy, Blood 04/02/2025 10.6 (L)  30.0 - 80.0 ng/mL Final    Hepatitis C Ab 04/02/2025 Non-Reactive  Non-Reactive Final    Uric Acid 04/02/2025 3.6  2.6 - 6.0 mg/dL Final    Anti-dsDNA 04/02/2025 Negative  Negative Final    Anti-DSDNA (IU) 04/02/2025 14  0 - 24 IU Final    SS-A/Ro Ab, IgG 04/02/2025 0.4  <1.0 (Negative) U Final    SS-B/La Ab, IgG 04/02/2025 <0.2  <1.0 (Negative) U Final    Sm Ab, IgG 04/02/2025 <0.2  <1.0 (Negative) U Final    RNP Ab, IgG 04/02/2025 1.1 (H)  <1.0 (Negative) U Final    Scl 70 Ab, IgG 04/02/2025 <0.2  <1.0 (Negative) U Final    Sugey 1 Ab, IgG 04/02/2025 <0.2  <1.0 (Negative) U Final    GERALDINE Screen 04/02/2025 Positive (A)  Negative Final    ONI Screen 04/02/2025 Negative  Negative Final    ONI Screen (EU) 04/02/2025 2.6  0.0 - 19.9 EUs Final    C3 04/02/2025 94  80 - 173 mg/dL Final    C4 04/02/2025 9 (L)  13 - 46 mg/dL Final    CK 04/02/2025 140  29 - 168 U/L Final    Complement, Total 04/02/2025 41  30 - 75 U/mL Final    CCP 04/02/2025 <16.0  <=19.9 units Final    HIV 1/2 04/02/2025 Non-Reactive  Non-Reactive Final    Anaplasma phagocytophilum Ab, IgG,S 04/02/2025 <1:64  <1:64 titer Final    Ehrlichia Chaffeensis (HME) Ab, IgG 04/02/2025 <1:64  <1:64 titer Final    Spotted Fever Group Ab, IgG, S 04/02/2025 <1:64  <1:64 Final    Spotted Fever Group Ab, IgM, S 04/02/2025 <1:64  <1:64 Final    Lyme IgG/IgM 04/02/2025 Non-Reactive  Non-Reactive Final    Reviewed by 04/02/2025 SEE COMMENTS   Final    Lupus Anticoagulant Interp 04/02/2025 SEE COMMENTS   Final    Thrombin Time ([place]ovine), P 04/02/2025 18.2  15.8 - 24.9 sec Final    [place] Generic Orderable 04/02/2025 SEE COMMENTS   Final   Office Visit on 04/02/2025   Component Date Value Ref Range Status    POC Amphetamines 04/02/2025 Negative  Negative, Inconclusive Final    POC Barbiturates 04/02/2025 Negative  Negative, Inconclusive Final    POC  Benzodiazepines 04/02/2025 Negative  Negative, Inconclusive Final    POC Cocaine 04/02/2025 Negative  Negative, Inconclusive Final    POC THC 04/02/2025 Presumptive Positive (A)  Negative, Inconclusive Final    POC Methadone 04/02/2025 Negative  Negative, Inconclusive Final    POC Methamphetamine 04/02/2025 Negative  Negative, Inconclusive Final    POC Opiates 04/02/2025 Negative  Negative, Inconclusive Final    POC Oxycodone 04/02/2025 Negative  Negative, Inconclusive Final    POC Phencyclidine 04/02/2025 Negative  Negative, Inconclusive Final    POC Methylenedioxymethamphetamine * 04/02/2025 Negative  Negative, Inconclusive Final    POC Tricyclic Antidepressants 04/02/2025 Negative  Negative, Inconclusive Final    POC Buprenorphine 04/02/2025 Negative   Final     Acceptable 04/02/2025 Yes   Final    POC Temperature (Urine) 04/02/2025 90   Final    Creatinine, U 04/02/2025 101.1  mg/dL Final    Specific Gravity 04/02/2025 1.023   Final    pH 04/02/2025 5.9   Final    Oxidants 04/02/2025 Negative  Cutoff: 200 mg/L Final    Comment 04/02/2025 Normal   Final    Codeine 04/02/2025 Not Detected  Cutoff: 25 ng/mL Final    C6BG 04/02/2025 Not Detected  Cutoff: 100 ng/mL Final    Morphine 04/02/2025 Not Detected  Cutoff: 25 ng/mL Final    M6BG 04/02/2025 Not Detected  Cutoff: 100 ng/mL Final    6 Monoacetylmorphine 04/02/2025 Not Detected  Cutoff: 25 ng/mL Final    Hydrocodone 04/02/2025 Not Detected  Cutoff: 25 ng/mL Final    Norhydrocodone 04/02/2025 Not Detected  Cutoff: 25 ng/mL Final    Dihydrocodeine 04/02/2025 Not Detected  Cutoff: 25 ng/mL Final    Hydromorphone 04/02/2025 Not Detected  Cutoff: 25 ng/mL Final    Hydromorphone-3-beta-glucuronide 04/02/2025 Not Detected  Cutoff: 100 ng/mL Final    Oxycodone 04/02/2025 Not Detected  Cutoff: 25 ng/mL Final    Noroxycodone 04/02/2025 Not Detected  Cutoff: 25 ng/mL Final    Oxymorphone 04/02/2025 Not Detected  Cutoff: 25 ng/mL Final     Oxymorphone-3-beta-glucuronid 04/02/2025 Not Detected  Cutoff: 100 ng/mL Final    Noroxymorphone 04/02/2025 Not Detected  Cutoff: 25 ng/mL Final    Fentanyl 04/02/2025 Not Detected  Cutoff: 2 ng/mL Final    Norfentanyl 04/02/2025 Not Detected  Cutoff: 2 ng/mL Final    Meperidine 04/02/2025 Not Detected  Cutoff: 25 ng/mL Final    Normeperidine 04/02/2025 Not Detected  Cutoff: 25 ng/mL Final    Naloxone 04/02/2025 Not Detected  Cutoff: 25 ng/mL Final    Naloxone-3-beta-glucuronide 04/02/2025 Not Detected  Cutoff: 100 ng/mL Final    Methadone 04/02/2025 Not Detected  Cutoff: 25 ng/mL Final    EDDP 04/02/2025 Not Detected  Cutoff: 25 ng/mL Final    Propoxyphene 04/02/2025 Not Detected  Cutoff: 25 ng/mL Final    Norpropoxyphene 04/02/2025 Not Detected  Cutoff: 25 ng/mL Final    Tramadol 04/02/2025 Not Detected  Cutoff: 25 ng/mL Final    O-desmethyltramadol 04/02/2025 Not Detected  Cutoff: 25 ng/mL Final    Tapentadol 04/02/2025 Not Detected  Cutoff: 25 ng/mL Final    N-Desmethyltapentadol 04/02/2025 Not Detected  Cutoff: 50 ng/mL Final    M TAPENTADOL-BETA-GLUCURONIDE 04/02/2025 Not Detected  Cutoff: 100 ng/mL Final    Buprenorphine 04/02/2025 Not Detected  Cutoff: 5 ng/mL Final    Norbuprenorphine 04/02/2025 Not Detected  Cutoff: 5 ng/mL Final    Norbuprenorphine glucuronide 04/02/2025 Not Detected  Cutoff: 20 ng/mL Final    Opioid Interpretation 04/02/2025 SEE COMMENTS   Final    Cocaine 04/02/2025 Negative  Cutoff: 150 ng/mL Final    Tetrahydrocannabinol 04/02/2025 Presumptive Positive (A)  Cutoff: 50 ng/mL Final    Alprazolam 04/02/2025 Not Detected  Cutoff: 10 ng/mL Final    Alpha-Hydroxyalprazolam 04/02/2025 Not Detected  Cutoff: 10 ng/mL Final    Alpha-Hydroxyalprazolam Glucuronide 04/02/2025 Not Detected  Cutoff: 50 ng/mL Final    Chlordiazepoxide 04/02/2025 Not Detected  Cutoff: 10 ng/mL Final    Clobazam 04/02/2025 Not Detected  Cutoff: 10 ng/mL Final    N-Desmethylclobazam 04/02/2025 Not Detected  Cutoff: 200  ng/mL Final    Clonazepam 04/02/2025 Not Detected  Cutoff: 10 ng/mL Final    7-aminoclonazepam 04/02/2025 Not Detected  Cutoff: 10 ng/mL Final    Diazepam 04/02/2025 Not Detected  Cutoff: 10 ng/mL Final    Nordiazepam 04/02/2025 Not Detected  Cutoff: 10 ng/mL Final    Flunitrazepam 04/02/2025 Not Detected  Cutoff: 10 ng/mL Final    7-aminoflunitrazepam 04/02/2025 Not Detected  Cutoff: 10 ng/mL Final    Flurazepam 04/02/2025 Not Detected  Cutoff: 10 ng/mL Final    2-Hydroxy Ethyl Flurazepam 04/02/2025 Not Detected  Cutoff: 10 ng/mL Final    Lorazepam 04/02/2025 Not Detected  Cutoff: 10 ng/mL Final    Lorazepam Glucuronide 04/02/2025 Not Detected  Cutoff: 50 ng/mL Final    Midazolam 04/02/2025 Not Detected  Cutoff: 10 ng/mL Final    Alpha-Hydroxy Midazolam 04/02/2025 Not Detected  Cutoff: 10 ng/mL Final    Oxazepam 04/02/2025 Not Detected  Cutoff: 10 ng/mL Final    Oxazepam Glucuronide 04/02/2025 Not Detected  Cutoff: 50 ng/mL Final    Prazepam 04/02/2025 Not Detected  Cutoff: 10 ng/mL Final    Temazepam 04/02/2025 Not Detected  Cutoff: 10 ng/mL Final    Temazepam Glucuronide 04/02/2025 Not Detected  Cutoff: 50 ng/mL Final    Triazolam 04/02/2025 Not Detected  Cutoff: 10 ng/mL Final    Alpha-Hydroxy Triazolam 04/02/2025 Not Detected  Cutoff: 10 ng/mL Final    Zolpidem 04/02/2025 Not Detected  Cutoff: 10 ng/mL Final    Zolpidem Phenyl-4-Carboxylic acid 04/02/2025 Not Detected  Cutoff: 10 ng/mL Final    Benzodiazepine Interpretation 04/02/2025 SEE COMMENTS   Final    List Patient's Current Medications 04/02/2025 Na   Final    Methamphetamine 04/02/2025 Not Detected  Cutoff: 100 ng/mL Final    Amphetamine 04/02/2025 Not Detected  Cutoff: 100 ng/mL Final    3,4-methylenedioxymethamphetamine * 04/02/2025 Not Detected  Cutoff: 100 ng/mL Final    3,1-izghfieqqvvlio-A-ethylamphetam* 04/02/2025 Not Detected  Cutoff: 100 ng/mL Final    3,4-methylenedioxyamphetamine (MDA) 04/02/2025 Not Detected  Cutoff: 100 ng/mL Final     Ephedrine 04/02/2025 Not Detected  Cutoff: 100 ng/mL Final    Pseudoephedrine 04/02/2025 Not Detected  Cutoff: 100 ng/mL Final    Phentermine 04/02/2025 Not Detected  Cutoff: 100 ng/mL Final    Phencyclidine (PCP) 04/02/2025 Not Detected  Cutoff: 20 ng/mL Final    Methylphenidate 04/02/2025 Not Detected  Cutoff: 20 ng/mL Final    Ritalinic acid 04/02/2025 Not Detected  Cutoff: 100 ng/mL Final    Stimulant Interpretation 04/02/2025 SEE COMMENTS   Final    Barbiturates 04/02/2025 Negative  Cutoff: 200 ng/mL Final    Carboxy-THC- by GC/MS 04/02/2025 46  Cutoff: 5 ng/mL Final    Carboxy-THC Interpretation 04/02/2025 Positive.   Final    Delta-8 Carboxy-Tetrahydrocannabin* 04/02/2025 Not Detected  Cutoff: 5 ng/mL Final   Admission on 03/23/2025, Discharged on 03/25/2025   Component Date Value Ref Range Status    Sodium 03/23/2025 137  136 - 145 mmol/L Final    Potassium 03/23/2025 2.9 (L)  3.5 - 5.1 mmol/L Final    Chloride 03/23/2025 102  98 - 107 mmol/L Final    CO2 03/23/2025 19 (L)  22 - 29 mmol/L Final    Anion Gap 03/23/2025 19 (H)  7 - 16 mmol/L Final    Glucose 03/23/2025 145 (H)  74 - 100 mg/dL Final    BUN 03/23/2025 13  7 - 19 mg/dL Final    Creatinine 03/23/2025 0.82  0.55 - 1.02 mg/dL Final    BUN/Creatinine Ratio 03/23/2025 16  6 - 20 Final    Calcium 03/23/2025 9.3  8.4 - 10.2 mg/dL Final    Total Protein 03/23/2025 7.6  6.4 - 8.3 g/dL Final    Albumin 03/23/2025 4.2  3.5 - 5.0 g/dL Final    Globulin 03/23/2025 3.4  2.0 - 4.0 g/dL Final    A/G Ratio 03/23/2025 1.2   Final    Bilirubin, Total 03/23/2025 0.4  <=1.5 mg/dL Final    Alk Phos 03/23/2025 123  40 - 150 U/L Final    ALT 03/23/2025 13  <=55 U/L Final    AST 03/23/2025 30  11 - 45 U/L Final    eGFR 03/23/2025 88  >=60 mL/min/1.73m2 Final    Lipase 03/23/2025 25  <=60 U/L Final    Color, UA 03/23/2025 Yellow  Colorless, Straw, Yellow, Dark Yellow, Light Yellow Final    Clarity, UA 03/23/2025 Clear  Clear Final    pH, UA 03/23/2025 5.5  5.0 to 8.0 pH  Units Final    Leukocytes, UA 03/23/2025 Negative  Negative Final    Nitrites, UA 03/23/2025 Negative  Negative Final    Protein, UA 03/23/2025 Negative  Negative Final    Glucose, UA 03/23/2025 Normal  Normal mg/dL Final    Ketones, UA 03/23/2025 Negative  Negative mg/dL Final    Urobilinogen, UA 03/23/2025 Normal  0.2, 1.0, Normal mg/dL Final    Bilirubin, UA 03/23/2025 Negative  Negative Final    Blood, UA 03/23/2025 Large (A)  Negative Final    Specific Gravity, UA 03/23/2025 1.023  <=1.030 Final    Magnesium 03/23/2025 1.9  1.6 - 2.6 mg/dL Final    WBC 03/23/2025 19.72 (H)  4.50 - 11.00 K/uL Final    RBC 03/23/2025 4.84  4.20 - 5.40 M/uL Final    Hemoglobin 03/23/2025 12.8  12.0 - 16.0 g/dL Final    Hematocrit 03/23/2025 40.1  38.0 - 47.0 % Final    MCV 03/23/2025 82.9  80.0 - 96.0 fL Final    MCH 03/23/2025 26.4 (L)  27.0 - 31.0 pg Final    MCHC 03/23/2025 31.9 (L)  32.0 - 36.0 g/dL Final    RDW 03/23/2025 13.8  11.5 - 14.5 % Final    Platelet Count 03/23/2025 261  150 - 400 K/uL Final    MPV 03/23/2025 10.4  9.4 - 12.4 fL Final    Neutrophils % 03/23/2025 81.9 (H)  53.0 - 65.0 % Final    Lymphocytes % 03/23/2025 12.1 (L)  27.0 - 41.0 % Final    Monocytes % 03/23/2025 4.7  2.0 - 6.0 % Final    Eosinophils % 03/23/2025 0.3 (L)  1.0 - 4.0 % Final    Basophils % 03/23/2025 0.4  0.0 - 1.0 % Final    Immature Granulocytes % 03/23/2025 0.6 (H)  0.0 - 0.4 % Final    nRBC, Auto 03/23/2025 0.0  <=0.0 % Final    Neutrophils, Abs 03/23/2025 16.17 (H)  1.80 - 7.70 K/uL Final    Lymphocytes, Absolute 03/23/2025 2.39  1.00 - 4.80 K/uL Final    Monocytes, Absolute 03/23/2025 0.92 (H)  0.00 - 0.80 K/uL Final    Eosinophils, Absolute 03/23/2025 0.05  0.00 - 0.50 K/uL Final    Basophils, Absolute 03/23/2025 0.07  0.00 - 0.20 K/uL Final    Immature Granulocytes, Absolute 03/23/2025 0.12 (H)  0.00 - 0.04 K/uL Final    nRBC, Absolute 03/23/2025 0.00  <=0.00 x10e3/uL Final    Diff Type 03/23/2025 Auto   Final    Fecal Occult Blood  03/23/2025 Positive   Final    Culture, Blood 03/23/2025 No Growth at 5 days   Final    Culture, Blood 03/23/2025 No Growth at 5 days   Final    WBC, UA 03/23/2025 2  <=5 /hpf Final    RBC, UA 03/23/2025 7 (H)  <=3 /hpf Final    Bacteria, UA 03/23/2025 Few (A)  None Seen /hpf Final    Squamous Epithelial Cells, UA 03/23/2025 Occasional (A)  None Seen /HPF Final    Mucous 03/23/2025 Occasional (A)  None Seen /LPF Final    POC PH 03/23/2025 7.35  7.32 - 7.42 Final    POC PCO2 03/23/2025 12 (LL)  41 - 51 mmHg Final    POC PO2 03/23/2025 47 (H)  25 - 40 mmHg Final    POC Sodium 03/23/2025 150 (H)  136 - 145 mmol/L Final    POC Potassium 03/23/2025 0.6 (LL)  3.4 - 4.5 mmol/L Final    POC Ionized Calcium 03/23/2025 0.37 (LL)  1.15 - 1.35 mmol/L Final    POCT Glucose 03/23/2025 37 (L)  60 - 95 mg/dL Final    POC Lactate 03/23/2025 0.4  0.3 - 1.2 mmol/L Final    POC Hematocrit 03/23/2025 <15 (LL)  35 - 51 % Final    POC HCO3 03/23/2025 6.6 (LL)  24.0 - 28.0 mmol/L Final    POC CO2 03/23/2025 7.0  mmol/L Final    POC Base Excess 03/23/2025 -16.1 (L)  -2.0 - 3.0 mmol/L Final    POC SATURATED O2 03/23/2025 80 (H)  40 - 70 % Final    Barbiturates, Urine 03/24/2025 Negative  Negative Final    Benzodiazepine, Urine 03/24/2025 Negative  Negative Final    Opiates, Urine 03/24/2025 Negative  Negative Final    Phencyclidine, Urine 03/24/2025 Negative  Negative Final    Amphetamine, Urine 03/24/2025 Negative  Negative Final    Cannabinoid, Urine 03/24/2025 Positive (A)  Negative Final    Cocaine, Urine 03/24/2025 Negative  Negative Final    QRS Duration 03/23/2025 80  ms Final    [facility] QTC Calculation 03/23/2025 443  ms Final    Case Report 03/24/2025    Final                    Value:Surgical Pathology                                Case: [id number]                                   Authorizing Provider:  Dev Holbrook MD     Collected:           03/24/2025 02:15 PM          Ordering Location:     [facility] -          Received:            03/24/2025 03:37 PM                                 Endoscopy                                                                    Pathologist:           Michael Puente MD                                                           Specimens:   A) - Stomach, a. gastric fundus bx                                                                  B) - Stomach, b. gastric antrum and body bx                                                Final Diagnosis 03/24/2025    Final                    Value:A. Gastric fundus, biopsy:  Oxyntic and transitional mucosa with minimal chronic gastritis; H. pylori immunohistochemistry is positive    B. Gastric antrum and body, biopsies:  Antral and oxyntic mucosa with mild to moderate active chronic gastritis; H. pylori immunohistochemistry is positive      Gross Description 03/24/2025    Final                    Value:A. Stomach: a. gastric fundus bx  The specimen is received in formalin designated a. gastric fundus bx and consists of a single pink-tan tissue fragment measures 0.4 cm in greatest dimension and is entirely submitted in cassette A1.    Grossing was completed by Zi Brewer.  B. Stomach: b. gastric antrum and body bx  The specimen is received in formalin designated b. gastric antrum and body bx and consists of 3 pink-tan tissue fragments that measure from 0.2-0.5 cm in greatest dimension and is entirely submitted in cassette B1.    Grossing was completed by Zi Brewer.      Microscopic Description 03/24/2025    Final                    Value:A microscopic examination was performed and the diagnosis reflects the findings.          Laboratory Notes 03/24/2025    Final                    Value:If this report includes immunohistochemical (IHC) test results, please note the following: IHC studies were interpreted in conjunction with appropriate positive and negative controls which demonstrate the expected positive and negative reactivity. This  laboratory is regulated under CLIA as qualified to perform high-complexity testing. IHC tests are used for clinical purposes. They should not be regarded as investigational or research.        Sodium 03/25/2025 139  136 - 145 mmol/L Final    Potassium 03/25/2025 3.9  3.5 - 5.1 mmol/L Final    Chloride 03/25/2025 111 (H)  98 - 107 mmol/L Final    CO2 03/25/2025 24  22 - 29 mmol/L Final    Anion Gap 03/25/2025 8  7 - 16 mmol/L Final    Glucose 03/25/2025 80  74 - 100 mg/dL Final    BUN 03/25/2025 7  7 - 19 mg/dL Final    Creatinine 03/25/2025 0.71  0.55 - 1.02 mg/dL Final    BUN/Creatinine Ratio 03/25/2025 10  6 - 20 Final    Calcium 03/25/2025 8.5  8.4 - 10.2 mg/dL Final    Total Protein 03/25/2025 5.8 (L)  6.4 - 8.3 g/dL Final    Albumin 03/25/2025 3.2 (L)  3.5 - 5.0 g/dL Final    Globulin 03/25/2025 2.6  2.0 - 4.0 g/dL Final    A/G Ratio 03/25/2025 1.2   Final    Bilirubin, Total 03/25/2025 0.5  <=1.5 mg/dL Final    Alk Phos 03/25/2025 89  40 - 150 U/L Final    ALT 03/25/2025 9  <=55 U/L Final    AST 03/25/2025 19  11 - 45 U/L Final    eGFR 03/25/2025 104  >=60 mL/min/1.73m2 Final    WBC 03/25/2025 8.62  4.50 - 11.00 K/uL Final    RBC 03/25/2025 4.17 (L)  4.20 - 5.40 M/uL Final    Hemoglobin 03/25/2025 10.9 (L)  12.0 - 16.0 g/dL Final    Hematocrit 03/25/2025 35.4 (L)  38.0 - 47.0 % Final    MCV 03/25/2025 84.9  80.0 - 96.0 fL Final    MCH 03/25/2025 26.1 (L)  27.0 - 31.0 pg Final    MCHC 03/25/2025 30.8 (L)  32.0 - 36.0 g/dL Final    RDW 03/25/2025 14.4  11.5 - 14.5 % Final    Platelet Count 03/25/2025 211  150 - 400 K/uL Final    MPV 03/25/2025 10.5  9.4 - 12.4 fL Final    Neutrophils % 03/25/2025 58.8  53.0 - 65.0 % Final    Lymphocytes % 03/25/2025 33.8  27.0 - 41.0 % Final    Monocytes % 03/25/2025 5.2  2.0 - 6.0 % Final    Eosinophils % 03/25/2025 1.3  1.0 - 4.0 % Final    Basophils % 03/25/2025 0.8  0.0 - 1.0 % Final    Immature Granulocytes % 03/25/2025 0.1  0.0 - 0.4 % Final    nRBC, Auto 03/25/2025 0.0   <=0.0 % Final    Neutrophils, Abs 03/25/2025 5.07  1.80 - 7.70 K/uL Final    Lymphocytes, Absolute 03/25/2025 2.91  1.00 - 4.80 K/uL Final    Monocytes, Absolute 03/25/2025 0.45  0.00 - 0.80 K/uL Final    Eosinophils, Absolute 03/25/2025 0.11  0.00 - 0.50 K/uL Final    Basophils, Absolute 03/25/2025 0.07  0.00 - 0.20 K/uL Final    Immature Granulocytes, Absolute 03/25/2025 0.01  0.00 - 0.04 K/uL Final    nRBC, Absolute 03/25/2025 0.00  <=0.00 x10e3/uL Final    Diff Type 03/25/2025 Auto   Final   Office Visit on 03/13/2025   Component Date Value Ref Range Status    Triglycerides 03/13/2025 105  37 - 140 mg/dL Final    Cholesterol 03/13/2025 151  <=200 mg/dL Final    HDL Cholesterol 03/13/2025 48  35 - 60 mg/dL Final    Cholesterol/HDL Ratio (Risk Factor) 03/13/2025 3.1   Final    Non-HDL 03/13/2025 103  mg/dL Final    LDL Calculated 03/13/2025 82  mg/dL Final    LDL/HDL 03/13/2025 1.7   Final    VLDL 03/13/2025 21  mg/dL Final    Hemoglobin A1C 03/13/2025 5.4  <=7.0 % Final    Estimated Average Glucose 03/13/2025 108  mg/dL Final    Sodium 03/13/2025 144  136 - 145 mmol/L Final    Potassium 03/13/2025 3.6  3.5 - 5.1 mmol/L Final    Chloride 03/13/2025 105  98 - 107 mmol/L Final    CO2 03/13/2025 27  22 - 29 mmol/L Final    Anion Gap 03/13/2025 16  7 - 16 mmol/L Final    Glucose 03/13/2025 62 (L)  74 - 100 mg/dL Final    BUN 03/13/2025 9  7 - 19 mg/dL Final    Creatinine 03/13/2025 0.74  0.55 - 1.02 mg/dL Final    BUN/Creatinine Ratio 03/13/2025 12  6 - 20 Final    Calcium 03/13/2025 9.0  8.4 - 10.2 mg/dL Final    Total Protein 03/13/2025 7.3  6.4 - 8.3 g/dL Final    Albumin 03/13/2025 4.0  3.5 - 5.0 g/dL Final    Globulin 03/13/2025 3.3  2.0 - 4.0 g/dL Final    A/G Ratio 03/13/2025 1.2   Final    Bilirubin, Total 03/13/2025 0.2  <=1.5 mg/dL Final    Alk Phos 03/13/2025 103  40 - 150 U/L Final    ALT 03/13/2025 9  <=55 U/L Final    AST 03/13/2025 24  11 - 45 U/L Final    eGFR 03/13/2025 99  >=60 mL/min/1.73m2 Final     "TSH 03/13/2025 3.264  0.350 - 4.940 uIU/mL Final    WBC 03/13/2025 10.90  4.50 - 11.00 K/uL Final    RBC 03/13/2025 4.64  4.20 - 5.40 M/uL Final    Hemoglobin 03/13/2025 12.1  12.0 - 16.0 g/dL Final    Hematocrit 03/13/2025 39.6  38.0 - 47.0 % Final    MCV 03/13/2025 85.3  80.0 - 96.0 fL Final    MCH 03/13/2025 26.1 (L)  27.0 - 31.0 pg Final    MCHC 03/13/2025 30.6 (L)  32.0 - 36.0 g/dL Final    RDW 03/13/2025 14.7 (H)  11.5 - 14.5 % Final    Platelet Count 03/13/2025 272  150 - 400 K/uL Final    MPV 03/13/2025 11.2  9.4 - 12.4 fL Final    Neutrophils % 03/13/2025 60.2  53.0 - 65.0 % Final    Lymphocytes % 03/13/2025 31.2  27.0 - 41.0 % Final    Monocytes % 03/13/2025 6.1 (H)  2.0 - 6.0 % Final    Eosinophils % 03/13/2025 1.4  1.0 - 4.0 % Final    Basophils % 03/13/2025 0.9  0.0 - 1.0 % Final    Immature Granulocytes % 03/13/2025 0.2  0.0 - 0.4 % Final    nRBC, Auto 03/13/2025 0.0  <=0.0 % Final    Neutrophils, Abs 03/13/2025 6.56  1.80 - 7.70 K/uL Final    Lymphocytes, Absolute 03/13/2025 3.40  1.00 - 4.80 K/uL Final    Monocytes, Absolute 03/13/2025 0.67  0.00 - 0.80 K/uL Final    Eosinophils, Absolute 03/13/2025 0.15  0.00 - 0.50 K/uL Final    Basophils, Absolute 03/13/2025 0.10  0.00 - 0.20 K/uL Final    Immature Granulocytes, Absolute 03/13/2025 0.02  0.00 - 0.04 K/uL Final    nRBC, Absolute 03/13/2025 0.00  <=0.00 x10e3/uL Final    Diff Type 03/13/2025 Auto   Final   Admission on 03/11/2025, Discharged on 03/11/2025   Component Date Value Ref Range Status    INFLUENZA A MOLECULAR 03/11/2025 Negative  Negative Final    INFLUENZA B MOLECULAR  03/11/2025 Negative  Negative Final    SARS COV-2 Molecular 03/11/2025 Negative  Negative, Invalid Final   There may be more visits with results that are not included.       Vitals:   Vitals:    07/23/25 1356   BP: 128/84   Pulse: 83   Resp: 18   Weight: 77.6 kg (171 lb)   Height: 5' 3" (1.6 m)   PainSc:   8   PainLoc: Neck        Review of Systems   Constitutional: " Negative.    HENT: Negative.     Eyes: Negative.    Respiratory: Negative.     Cardiovascular: Negative.    Gastrointestinal: Negative.    Endocrine: Negative.    Genitourinary: Negative.    Musculoskeletal:  Positive for arthralgias, back pain, myalgias and neck pain.   Integumentary:  Negative.   Neurological:  Positive for weakness (Right upper extremity) and numbness (Right upper extremity).   Hematological: Negative.    Psychiatric/Behavioral:  Positive for sleep disturbance.        PHYSICAL EXAMINATION:  Physical Exam  Vitals and nursing note reviewed.   Constitutional:       General: She is not in acute distress.     Appearance: Normal appearance. She is not ill-appearing, toxic-appearing or diaphoretic.   HENT:      Head: Normocephalic and atraumatic.      Nose: Nose normal.      Mouth/Throat:      Mouth: Mucous membranes are moist.   Eyes:      Extraocular Movements: Extraocular movements intact.      Pupils: Pupils are equal, round, and reactive to light.   Cardiovascular:      Rate and Rhythm: Normal rate and regular rhythm.      Heart sounds: Normal heart sounds.   Pulmonary:      Effort: Pulmonary effort is normal. No respiratory distress.      Breath sounds: Normal breath sounds. No stridor. No wheezing or rhonchi.   Abdominal:      General: Bowel sounds are normal.      Palpations: Abdomen is soft.   Musculoskeletal:         General: No swelling or deformity.      Right hand: Tenderness and bony tenderness present. Decreased range of motion.      Cervical back: Tenderness present. No spasms. Pain with movement present. Decreased range of motion.      Thoracic back: Normal.      Lumbar back: Tenderness and bony tenderness present. No spasms. Decreased range of motion. Negative right straight leg raise test and negative left straight leg raise test. No scoliosis.      Right lower leg: No edema.      Left lower leg: No edema.      Comments: Cervical pain with flexion, extension lateral rotation.   Cervical facet tenderness to palpation from C4 through 7   Skin:     General: Skin is warm.   Neurological:      General: No focal deficit present.      Mental Status: She is alert and oriented to person, place, and time. Mental status is at baseline.      Cranial Nerves: No cranial nerve deficit.      Sensory: Sensation is intact. No sensory deficit.      Motor: Weakness (Left  strength) present.      Coordination: Coordination normal.      Gait: Gait normal.      Deep Tendon Reflexes:      Reflex Scores:       Tricep reflexes are 1+ on the right side and 1+ on the left side.       Bicep reflexes are 1+ on the right side and 1+ on the left side.  Psychiatric:         Mood and Affect: Mood normal.         Behavior: Behavior normal.            Assessment and Plan:    Encounter Diagnoses   Name Primary?    Polyarthralgia Yes    GERALDINE positive     Cervical radiculopathy     Lumbosacral radiculopathy          Plan:  1. report:  Reviewed and consistent with medication use as prescribed.    2.Addiction, Dependency, Tolerance, Opioid abuse-misuse, Death, Diversion Discussed. Overdose reversal drug Naloxone discussed. Patient is prescribed opiates for chronic nonmalignant pain pathology.  Patient is receiving opiates which require greater than a 72 hour supply of therapy.  Patient was educated on potential dependency associated with long-term opioid use as well as decreasing efficacy with prolonged use.  Patient was advised of risks, benefits and side effects and how to utilize each medication.  Patient was also informed that any deviation from therapy protocol will  lead to discontinuation of opiates.  It is reasonable to prescribe opioid analgesics for patient based on positive response to opioid medications, lack of side effects and  limited aberrant behavior.      3.Refill/Continue medications for pain control and function       Requested Prescriptions     Signed Prescriptions Disp Refills     HYDROcodone-acetaminophen (NORCO)  mg per tablet 60 tablet 0     Sig: Take 1 tablet by mouth every 12 (twelve) hours as needed for pain.... May cause drowsiness    HYDROcodone-acetaminophen (NORCO)  mg per tablet 60 tablet 0     Sig: Take 1 tablet by mouth every 12 (twelve) hours as needed for Pain.    gabapentin (NEURONTIN) 300 MG capsule 90 capsule 1     Sig: Take 1 capsule (300 mg total) by mouth 3 (three) times daily... May cause drowsiness     4. Patient referred to rheumatology for diffuse joint pain and positive GERALDINE    5. Start physical therapy 2 to 3 times week x6 weeks for cervical pain and radiculopathy    Orders Placed This Encounter   Procedures    Ambulatory referral/consult to Rheumatology     Standing Status:   Future     Expected Date:   7/30/2025     Expiration Date:   8/23/2026     Referral Priority:   Routine     Referral Type:   Consultation     Referral Reason:   Specialty Services Required     Requested Specialty:   Rheumatology     Number of Visits Requested:   1    Ambulatory Referral/Consult to Physical Therapy     Standing Status:   Future     Expected Date:   7/30/2025     Expiration Date:   8/23/2026     Referral Priority:   Routine     Referral Type:   Physical Therapy     Referral Reason:   Specialty Services Required     Number of Visits Requested:   1          CERVICAL DISC DISORDER AND RADICULOPATHY:  - Cervical epidural steroid injection discussed, but insurance denied.  - Attend PT for cervical and L spine.  - Referred to PT for cervical and L spine.    LUMBAR DISC DISORDER AND RADICULOPATHY:  - Attend PT for cervical and L spine.  - Referred to PT for cervical and L spine.    PAIN MANAGEMENT:  - Toradol injection administered in office setting today in office today  - Refilled Neurontin, to be taken 3 times daily.  - Refilled hydrocodone, to be taken twice daily (morning and night).    RHEUMATOID ARTHRITIS:  - Referred to Dr. Tabares in [place] for evaluation of  positive GERALDINE and polyarthralgia.    FOLLOW-UP:  - Follow up in 2 months.         This note was generated with the assistance of ambient listening technology. Verbal consent was obtained by the patient and accompanying visitor(s) for the recording of patient appointment to facilitate this note. I attest to having reviewed and edited the generated note for accuracy, though some syntax or spelling errors may persist. Please contact the author of this note for any clarification.       Date of encounter: 7/24/2025  Kami Hirsch MD          [1]   Current Outpatient Medications:     acetaminophen-codeine 300-30mg (TYLENOL #3) 300-30 mg Tab, Take 1 tablet by mouth 2 (two) times daily as needed (pain)., Disp: 60 tablet, Rfl: 0    azelastine (ASTELIN) 137 mcg (0.1 %) nasal spray, 1 spray (137 mcg total) by Nasal route 2 (two) times daily., Disp: 30 mL, Rfl: 2    ergocalciferol (ERGOCALCIFEROL) 50,000 unit Cap, Take 1 capsule (50,000 Units total) by mouth every 7 days., Disp: 8 capsule, Rfl: 0    sertraline (ZOLOFT) 25 MG tablet, Take 25 mg by mouth every evening., Disp: , Rfl:     gabapentin (NEURONTIN) 300 MG capsule, Take 1 capsule (300 mg total) by mouth 3 (three) times daily., Disp: 90 capsule, Rfl: 1    gabapentin (NEURONTIN) 300 MG capsule, Take 1 capsule (300 mg total) by mouth 3 (three) times daily... May cause drowsiness, Disp: 90 capsule, Rfl: 1    hydroCHLOROthiazide 12.5 MG Tab, Take 1 tablet (12.5 mg total) by mouth once daily. for 30 doses, Disp: 30 tablet, Rfl: 0    HYDROcodone-acetaminophen (NORCO)  mg per tablet, Take 1 tablet by mouth every 12 (twelve) hours as needed for pain.... May cause drowsiness, Disp: 60 tablet, Rfl: 0    [START ON 8/22/2025] HYDROcodone-acetaminophen (NORCO)  mg per tablet, Take 1 tablet by mouth every 12 (twelve) hours as needed for Pain., Disp: 60 tablet, Rfl: 0    loratadine (CLARITIN) 10 mg tablet, Take 1 tablet (10 mg total) by mouth once daily. for 30 doses,  Disp: 30 tablet, Rfl: 0    pantoprazole (PROTONIX) 40 MG tablet, Take 1 tablet (40 mg total) by mouth 2 (two) times daily., Disp: 60 tablet, Rfl: 0  No current facility-administered medications for this visit.

## 2025-08-05 ENCOUNTER — PATIENT MESSAGE (OUTPATIENT)
Facility: HOSPITAL | Age: 50
End: 2025-08-05
Payer: COMMERCIAL

## 2025-08-13 PROCEDURE — 88142 CYTOPATH C/V THIN LAYER: CPT | Mod: TC,GCY | Performed by: OBSTETRICS & GYNECOLOGY

## 2025-08-14 ENCOUNTER — TELEPHONE (OUTPATIENT)
Dept: VASCULAR SURGERY | Facility: CLINIC | Age: 50
End: 2025-08-14
Payer: COMMERCIAL

## 2025-08-18 ENCOUNTER — OFFICE VISIT (OUTPATIENT)
Dept: FAMILY MEDICINE | Facility: CLINIC | Age: 50
End: 2025-08-18
Payer: COMMERCIAL

## 2025-08-18 VITALS
DIASTOLIC BLOOD PRESSURE: 80 MMHG | SYSTOLIC BLOOD PRESSURE: 144 MMHG | OXYGEN SATURATION: 98 % | WEIGHT: 175 LBS | HEART RATE: 68 BPM | BODY MASS INDEX: 31.01 KG/M2 | RESPIRATION RATE: 20 BRPM | HEIGHT: 63 IN | TEMPERATURE: 98 F

## 2025-08-18 DIAGNOSIS — J42 CHRONIC BRONCHITIS, UNSPECIFIED CHRONIC BRONCHITIS TYPE: ICD-10-CM

## 2025-08-18 DIAGNOSIS — L50.9 URTICARIA: Primary | ICD-10-CM

## 2025-08-18 PROCEDURE — 96372 THER/PROPH/DIAG INJ SC/IM: CPT | Mod: ,,, | Performed by: NURSE PRACTITIONER

## 2025-08-18 PROCEDURE — 3077F SYST BP >= 140 MM HG: CPT | Mod: CPTII,,, | Performed by: NURSE PRACTITIONER

## 2025-08-18 PROCEDURE — 3044F HG A1C LEVEL LT 7.0%: CPT | Mod: CPTII,,, | Performed by: NURSE PRACTITIONER

## 2025-08-18 PROCEDURE — 1160F RVW MEDS BY RX/DR IN RCRD: CPT | Mod: CPTII,,, | Performed by: NURSE PRACTITIONER

## 2025-08-18 PROCEDURE — 3079F DIAST BP 80-89 MM HG: CPT | Mod: CPTII,,, | Performed by: NURSE PRACTITIONER

## 2025-08-18 PROCEDURE — 1159F MED LIST DOCD IN RCRD: CPT | Mod: CPTII,,, | Performed by: NURSE PRACTITIONER

## 2025-08-18 PROCEDURE — 3008F BODY MASS INDEX DOCD: CPT | Mod: CPTII,,, | Performed by: NURSE PRACTITIONER

## 2025-08-18 PROCEDURE — 99213 OFFICE O/P EST LOW 20 MIN: CPT | Mod: 25,,, | Performed by: NURSE PRACTITIONER

## 2025-08-18 RX ORDER — DEXAMETHASONE SODIUM PHOSPHATE 4 MG/ML
6 INJECTION, SOLUTION INTRA-ARTICULAR; INTRALESIONAL; INTRAMUSCULAR; INTRAVENOUS; SOFT TISSUE
Status: COMPLETED | OUTPATIENT
Start: 2025-08-18 | End: 2025-08-18

## 2025-08-18 RX ORDER — PROMETHAZINE HYDROCHLORIDE AND DEXTROMETHORPHAN HYDROBROMIDE 6.25; 15 MG/5ML; MG/5ML
5 SYRUP ORAL EVERY 6 HOURS PRN
Qty: 120 ML | Refills: 0 | Status: SHIPPED | OUTPATIENT
Start: 2025-08-18 | End: 2025-08-28

## 2025-08-18 RX ORDER — METHYLPREDNISOLONE 4 MG/1
TABLET ORAL
Qty: 21 EACH | Refills: 0 | Status: SHIPPED | OUTPATIENT
Start: 2025-08-18 | End: 2025-09-08

## 2025-08-18 RX ORDER — ALBUTEROL SULFATE 90 UG/1
2 INHALANT RESPIRATORY (INHALATION) EVERY 6 HOURS PRN
Qty: 18 G | Refills: 0 | Status: SHIPPED | OUTPATIENT
Start: 2025-08-18 | End: 2026-08-18

## 2025-08-18 RX ADMIN — DEXAMETHASONE SODIUM PHOSPHATE 6 MG: 4 INJECTION, SOLUTION INTRA-ARTICULAR; INTRALESIONAL; INTRAMUSCULAR; INTRAVENOUS; SOFT TISSUE at 12:08

## 2025-08-26 ENCOUNTER — CLINICAL SUPPORT (OUTPATIENT)
Dept: REHABILITATION | Facility: HOSPITAL | Age: 50
End: 2025-08-26
Attending: PAIN MEDICINE
Payer: COMMERCIAL

## 2025-08-26 DIAGNOSIS — M54.50 CHRONIC BILATERAL LOW BACK PAIN WITHOUT SCIATICA: Primary | ICD-10-CM

## 2025-08-26 DIAGNOSIS — R53.1 DECREASED STRENGTH: ICD-10-CM

## 2025-08-26 DIAGNOSIS — R52 PAIN: ICD-10-CM

## 2025-08-26 DIAGNOSIS — G89.29 CHRONIC BILATERAL LOW BACK PAIN WITHOUT SCIATICA: Primary | ICD-10-CM

## 2025-08-26 PROCEDURE — 97162 PT EVAL MOD COMPLEX 30 MIN: CPT

## 2025-09-02 ENCOUNTER — CLINICAL SUPPORT (OUTPATIENT)
Dept: REHABILITATION | Facility: HOSPITAL | Age: 50
End: 2025-09-02
Payer: COMMERCIAL

## 2025-09-02 DIAGNOSIS — R53.1 DECREASED STRENGTH: ICD-10-CM

## 2025-09-02 DIAGNOSIS — R52 PAIN: Primary | ICD-10-CM

## 2025-09-02 PROCEDURE — 97112 NEUROMUSCULAR REEDUCATION: CPT | Mod: CQ

## 2025-09-02 PROCEDURE — 97140 MANUAL THERAPY 1/> REGIONS: CPT | Mod: CQ

## 2025-09-03 ENCOUNTER — TELEPHONE (OUTPATIENT)
Dept: OBSTETRICS AND GYNECOLOGY | Facility: CLINIC | Age: 50
End: 2025-09-03
Payer: COMMERCIAL

## 2025-09-03 ENCOUNTER — RESULTS FOLLOW-UP (OUTPATIENT)
Dept: OBSTETRICS AND GYNECOLOGY | Facility: CLINIC | Age: 50
End: 2025-09-03
Payer: COMMERCIAL

## 2025-09-03 DIAGNOSIS — B37.31 VAGINAL YEAST INFECTION: Primary | ICD-10-CM

## 2025-09-03 RX ORDER — FLUCONAZOLE 150 MG/1
150 TABLET ORAL DAILY
Qty: 1 TABLET | Refills: 0 | Status: SHIPPED | OUTPATIENT
Start: 2025-09-03 | End: 2025-09-04

## 2025-09-04 ENCOUNTER — CLINICAL SUPPORT (OUTPATIENT)
Dept: REHABILITATION | Facility: HOSPITAL | Age: 50
End: 2025-09-04
Payer: COMMERCIAL

## 2025-09-04 DIAGNOSIS — R52 PAIN: Primary | ICD-10-CM

## 2025-09-04 DIAGNOSIS — R53.1 DECREASED STRENGTH: ICD-10-CM

## 2025-09-04 PROCEDURE — 97124 MASSAGE THERAPY: CPT

## 2025-09-04 PROCEDURE — 97012 MECHANICAL TRACTION THERAPY: CPT

## (undated) DEVICE — GOWN NONREINF SET-IN SLV 2XL

## (undated) DEVICE — ELECTRODE LAPSCP L HOOK 36CM

## (undated) DEVICE — ADHESIVE DERMABOND ADVANCED

## (undated) DEVICE — SCISSORS INSERT ELECTROSCOPE

## (undated) DEVICE — ENDOSTITCH INSTRUMENT

## (undated) DEVICE — RELOAD V-LOC 180 0 8IN/20CM

## (undated) DEVICE — DISSECTOR KITTNER 5MM T 45CM S

## (undated) DEVICE — SUT 0 VICRYL / UR6 (J603)

## (undated) DEVICE — KIT GYN LAPAROSCOPY RUSH

## (undated) DEVICE — TRAY CATH FOL SIL URIMTR 16FR

## (undated) DEVICE — TUBE SUCTION MEDI-VAC STERILE

## (undated) DEVICE — GLOVE 6.0 PROTEXIS PI BLUE

## (undated) DEVICE — FUSION DEVICE VOYANT BLUNT TIP 5MMX37CM

## (undated) DEVICE — GLOVE BIOGEL SKINSENSE PI 7.5

## (undated) DEVICE — GLOVE PROTEXIS PI SYN SURG 6.0

## (undated) DEVICE — OCCLUDER COLPO-PNEUMO STERILE

## (undated) DEVICE — TOWEL OR DISP STRL BLUE 4/PK

## (undated) DEVICE — SOL NACL IRR 3000ML

## (undated) DEVICE — SPONGE CODMAN SURG NEUR .5X1.5

## (undated) DEVICE — GLOVE PROTEXIS PI SYN SURG 7

## (undated) DEVICE — WARMER BLUE HEAT SCOPE 3-12MM

## (undated) DEVICE — IRRIGATOR ENDOSCOPY DISP.

## (undated) DEVICE — STRIP MEDI WND CLSR 1/2X4IN

## (undated) DEVICE — SOL NACL IRR 1000ML BTL

## (undated) DEVICE — SUT VICRYL PLUS 1 CTX 36IN

## (undated) DEVICE — PENCIL ELECTROSURG HOLST W/BLD

## (undated) DEVICE — SUT 4-0 VICRYL / FS-2

## (undated) DEVICE — GELPOINT MINI KIT ENDOSCOPIC

## (undated) DEVICE — MANIPULATOR UTERINE 3.5CM

## (undated) DEVICE — GLOVE PROTEXIS PI SYN SURG 6.5

## (undated) DEVICE — SYR 50CC LL

## (undated) DEVICE — SET CYSTO IRR DRP CHMBR 84IN